# Patient Record
Sex: FEMALE | Race: WHITE | Employment: UNEMPLOYED | URBAN - NONMETROPOLITAN AREA
[De-identification: names, ages, dates, MRNs, and addresses within clinical notes are randomized per-mention and may not be internally consistent; named-entity substitution may affect disease eponyms.]

---

## 2018-08-24 ENCOUNTER — HOSPITAL ENCOUNTER (OUTPATIENT)
Dept: LAB | Age: 58
Setting detail: SPECIMEN
Discharge: HOME | End: 2018-08-24
Payer: MEDICARE

## 2018-08-24 LAB
ALBUMIN SERPL-MCNC: 4.2 G/DL (ref 3.5–5.2)
ALBUMIN/GLOBULIN RATIO: 1.2 (ref 1–2.5)
ALP BLD-CCNC: 122 U/L (ref 35–104)
ALT SERPL-CCNC: 37 U/L (ref 5–33)
ANION GAP SERPL CALCULATED.3IONS-SCNC: 14 MMOL/L (ref 9–17)
AST SERPL-CCNC: 25 U/L
BILIRUB SERPL-MCNC: 0.52 MG/DL (ref 0.3–1.2)
BUN BLDV-MCNC: 7 MG/DL (ref 6–20)
BUN/CREAT BLD: 8 (ref 9–20)
CALCIUM SERPL-MCNC: 9.2 MG/DL (ref 8.6–10.4)
CHLORIDE BLD-SCNC: 99 MMOL/L (ref 98–107)
CO2: 25 MMOL/L (ref 20–31)
CREAT SERPL-MCNC: 0.85 MG/DL (ref 0.5–0.9)
GFR AFRICAN AMERICAN: >60 ML/MIN
GFR NON-AFRICAN AMERICAN: >60 ML/MIN
GFR SERPL CREATININE-BSD FRML MDRD: ABNORMAL ML/MIN/{1.73_M2}
GFR SERPL CREATININE-BSD FRML MDRD: ABNORMAL ML/MIN/{1.73_M2}
GLUCOSE BLD-MCNC: 164 MG/DL (ref 70–99)
POTASSIUM SERPL-SCNC: 3.7 MMOL/L (ref 3.7–5.3)
SODIUM BLD-SCNC: 138 MMOL/L (ref 135–144)
TOTAL PROTEIN: 7.8 G/DL (ref 6.4–8.3)
TSH SERPL DL<=0.05 MIU/L-ACNC: 1.62 MIU/L (ref 0.3–5)

## 2018-08-24 PROCEDURE — 84443 ASSAY THYROID STIM HORMONE: CPT

## 2018-08-24 PROCEDURE — 80053 COMPREHEN METABOLIC PANEL: CPT

## 2018-08-24 PROCEDURE — 36415 COLL VENOUS BLD VENIPUNCTURE: CPT

## 2018-11-04 ENCOUNTER — HOSPITAL ENCOUNTER (EMERGENCY)
Age: 58
Discharge: HOME | End: 2018-11-04
Attending: EMERGENCY MEDICINE
Payer: MEDICARE

## 2018-11-04 VITALS
TEMPERATURE: 98.7 F | SYSTOLIC BLOOD PRESSURE: 148 MMHG | OXYGEN SATURATION: 94 % | HEART RATE: 92 BPM | RESPIRATION RATE: 16 BRPM | DIASTOLIC BLOOD PRESSURE: 70 MMHG

## 2018-11-04 DIAGNOSIS — L29.9 ITCHY SKIN: Primary | ICD-10-CM

## 2018-11-04 PROCEDURE — 99282 EMERGENCY DEPT VISIT SF MDM: CPT

## 2018-11-04 RX ORDER — DIPHENHYDRAMINE HCL 25 MG
50 CAPSULE ORAL EVERY 6 HOURS PRN
Qty: 30 CAPSULE | Refills: 0 | Status: SHIPPED | OUTPATIENT
Start: 2018-11-04

## 2018-11-04 RX ORDER — DIAPER,BRIEF,INFANT-TODD,DISP
EACH MISCELLANEOUS
Qty: 1 TUBE | Refills: 1 | Status: SHIPPED | OUTPATIENT
Start: 2018-11-04 | End: 2018-11-11

## 2018-11-04 NOTE — ED PROVIDER NOTES
[id number] [facility]  eMERGENCY dEPARTMENT eNCOUnter      Pt Name: Myna Jeans  MRN: [id number]  [place] 1960  Date of evaluation: 11/4/2018      CHIEF COMPLAINT       Chief Complaint   Patient presents with    Pruritis     left lower leg         HISTORY OF PRESENT ILLNESS      The patient presents to the emergency department with left leg itching. This is been going on a few days. She is tried some lotion. She has not had leg swelling or calf discomfort. She has not tried Benadryl. This is the only area gets itching. She does a history of diabetes but does not drink alcohol. Nothing makes her symptoms better or worse otherwise. REVIEW OF SYSTEMS       All systems reviewed and negative unless noted in HPI. The patient denies fever or constitutional symptoms. Denies vision change. Denies any sore throat or rhinorrhea. Denies any neck pain or stiffness. Denies chest pain or shortness of breath. No nausea,  vomiting or diarrhea. Denies any dysuria. Denies urinary frequency or hematuria. Denies musculoskeletal injury or pain. Denies any weakness, numbness or focal neurologic deficit. Itching of left leg. No recent psychiatric issues. No easy bruising or bleeding. History of DM. PAST MEDICAL HISTORY    has a past medical history of Diabetes mellitus ([address] and Thyroid disease. SURGICAL HISTORY      has no past surgical history on file. CURRENT MEDICATIONS       Previous Medications    No medications on file       ALLERGIES     is allergic to bicillin [penicillin g benzathine] and geodon [ziprasidone]. FAMILY HISTORY     has no family status information on file. family history is not on file. SOCIAL HISTORY      reports that she has been smoking Cigarettes. She has a 40.00 pack-year smoking history. She has never used smokeless tobacco. She reports that she does not drink alcohol or use drugs.     PHYSICAL EXAM     INITIAL VITALS:  tympanic temperature is 98.7 °F (37.1 °C). Her blood pressure is 148/70 (abnormal) and her pulse is 92. Her respiration is 16 and oxygen saturation is 94%. Patient is alert and oriented, in no apparent distress. HEENT is atraumatic. Pupils are PERRL at 4 mm. Mucous membranes moist.    Neck is supple with no lymphadenopathy. No JVD. No meningismus. Heart sounds regular rate and rhythm with no gallops, murmurs, or rubs. Lungs clear, no wheezes, rales or rhonchi. Abdomen: soft, nontender with no pain to palpation. Musculoskeletal exam shows no evidence of trauma. Skin: 2 areas of redness noted on the patient's left leg, one more proximally and anteriorly. The other is more distally and anteriorly. No significant pharyngeal redness is appreciated. No pain to the calf no. No cords. No edema. Neurological exam reveals cranial nerves 2 through 12 grossly intact. Patient has equal  and normal deep tendon reflexes. Psychiatric: no hallucinations or suicidal ideation. Lymphatics.:  No lymphadenopathy. DIFFERENTIAL DIAGNOSIS/ MDM:     Pruritis, cellulitis, DVT    DIAGNOSTIC RESULTS       EMERGENCY DEPARTMENT COURSE:   Vitals:    Vitals:    11/04/18 1657   BP: (!) 148/70   Pulse: 92   Resp: 16   Temp: 98.7 °F (37.1 °C)   TempSrc: Tympanic   SpO2: 94%     -------------------------  BP: (!) 148/70, Temp: 98.7 °F (37.1 °C), Pulse: 92, Resp: 16      Re-evaluation Notes    I do not think the patient has a DVT. There is no skin breakdown. I will write for steroid cream and she may take Benadryl as needed. The patient is discharged in good condition. FINAL IMPRESSION      1.  Itchy skin          DISPOSITION/PLAN   DISPOSITION Decision To Discharge 11/04/2018 05:04:44 PM      Condition on Disposition    good    PATIENT REFERRED TO:  Quilla Oven, APRN - CNP  [address]  [phone]    In 1 week        DISCHARGE MEDICATIONS:  New Prescriptions

## 2019-01-03 ENCOUNTER — APPOINTMENT (OUTPATIENT)
Dept: GENERAL RADIOLOGY | Age: 59
End: 2019-01-03
Payer: MEDICARE

## 2019-01-03 ENCOUNTER — HOSPITAL ENCOUNTER (EMERGENCY)
Age: 59
Discharge: HOME | End: 2019-01-03
Attending: EMERGENCY MEDICINE
Payer: MEDICARE

## 2019-01-03 ENCOUNTER — APPOINTMENT (OUTPATIENT)
Dept: CT IMAGING | Age: 59
End: 2019-01-03
Payer: MEDICARE

## 2019-01-03 VITALS
DIASTOLIC BLOOD PRESSURE: 77 MMHG | RESPIRATION RATE: 12 BRPM | HEART RATE: 80 BPM | TEMPERATURE: 98.6 F | WEIGHT: 195 LBS | SYSTOLIC BLOOD PRESSURE: 120 MMHG | OXYGEN SATURATION: 98 %

## 2019-01-03 DIAGNOSIS — R41.0 CONFUSION: Primary | ICD-10-CM

## 2019-01-03 LAB
-: ABNORMAL
ABSOLUTE EOS #: 0.2 K/UL (ref 0–0.4)
ABSOLUTE IMMATURE GRANULOCYTE: NORMAL K/UL (ref 0–0.3)
ABSOLUTE LYMPH #: 2.3 K/UL (ref 1–4.8)
ABSOLUTE MONO #: 0.5 K/UL (ref 0.1–1.2)
AMORPHOUS: ABNORMAL
ANION GAP SERPL CALCULATED.3IONS-SCNC: 14 MMOL/L (ref 9–17)
BACTERIA: ABNORMAL
BASOPHILS # BLD: 1 % (ref 0–1)
BASOPHILS ABSOLUTE: 0.1 K/UL (ref 0–0.2)
BILIRUBIN URINE: NEGATIVE
BUN BLDV-MCNC: 12 MG/DL (ref 6–20)
BUN/CREAT BLD: 15 (ref 9–20)
CALCIUM SERPL-MCNC: 9.7 MG/DL (ref 8.6–10.4)
CASTS UA: ABNORMAL /LPF (ref 0–2)
CHLORIDE BLD-SCNC: 102 MMOL/L (ref 98–107)
CO2: 25 MMOL/L (ref 20–31)
COLOR: ABNORMAL
COMMENT UA: ABNORMAL
CREAT SERPL-MCNC: 0.81 MG/DL (ref 0.5–0.9)
CRYSTALS, UA: ABNORMAL /HPF
DIFFERENTIAL TYPE: NORMAL
EOSINOPHILS RELATIVE PERCENT: 2 % (ref 1–7)
EPITHELIAL CELLS UA: ABNORMAL /HPF (ref 0–5)
GFR AFRICAN AMERICAN: >60 ML/MIN
GFR NON-AFRICAN AMERICAN: >60 ML/MIN
GFR SERPL CREATININE-BSD FRML MDRD: ABNORMAL ML/MIN/{1.73_M2}
GFR SERPL CREATININE-BSD FRML MDRD: ABNORMAL ML/MIN/{1.73_M2}
GLUCOSE BLD-MCNC: 202 MG/DL (ref 70–99)
GLUCOSE URINE: ABNORMAL
HCT VFR BLD CALC: 45.3 % (ref 36–46)
HEMOGLOBIN: 15.3 G/DL (ref 12–16)
IMMATURE GRANULOCYTES: NORMAL %
KETONES, URINE: NEGATIVE
LEUKOCYTE ESTERASE, URINE: NEGATIVE
LYMPHOCYTES # BLD: 29 % (ref 16–46)
MCH RBC QN AUTO: 29.6 PG (ref 26–34)
MCHC RBC AUTO-ENTMCNC: 33.9 G/DL (ref 31–37)
MCV RBC AUTO: 87.5 FL (ref 80–100)
MONOCYTES # BLD: 7 % (ref 4–11)
MUCUS: ABNORMAL
NITRITE, URINE: NEGATIVE
NRBC AUTOMATED: NORMAL PER 100 WBC
OTHER OBSERVATIONS UA: ABNORMAL
PDW BLD-RTO: 13.9 % (ref 11–14.5)
PH UA: 6 (ref 5–6)
PLATELET # BLD: 225 K/UL (ref 140–450)
PLATELET ESTIMATE: NORMAL
PMV BLD AUTO: 8.1 FL (ref 6–12)
POTASSIUM SERPL-SCNC: 4.1 MMOL/L (ref 3.7–5.3)
PROTEIN UA: NEGATIVE
RBC # BLD: 5.18 M/UL (ref 4–5.2)
RBC # BLD: NORMAL 10*6/UL
RBC UA: ABNORMAL /HPF (ref 0–4)
RENAL EPITHELIAL, UA: ABNORMAL /HPF
SEG NEUTROPHILS: 61 % (ref 43–77)
SEGMENTED NEUTROPHILS ABSOLUTE COUNT: 5.1 K/UL (ref 1.8–7.7)
SODIUM BLD-SCNC: 141 MMOL/L (ref 135–144)
SPECIFIC GRAVITY UA: 1.02 (ref 1.01–1.02)
TRICHOMONAS: ABNORMAL
TURBIDITY: ABNORMAL
URINE HGB: NEGATIVE
UROBILINOGEN, URINE: NORMAL
WBC # BLD: 8.2 K/UL (ref 3.5–11)
WBC # BLD: NORMAL 10*3/UL
WBC UA: ABNORMAL /HPF (ref 0–4)
YEAST: ABNORMAL

## 2019-01-03 PROCEDURE — 80048 BASIC METABOLIC PNL TOTAL CA: CPT

## 2019-01-03 PROCEDURE — 70450 CT HEAD/BRAIN W/O DYE: CPT

## 2019-01-03 PROCEDURE — 81001 URINALYSIS AUTO W/SCOPE: CPT

## 2019-01-03 PROCEDURE — 85025 COMPLETE CBC W/AUTO DIFF WBC: CPT

## 2019-01-03 PROCEDURE — 99285 EMERGENCY DEPT VISIT HI MDM: CPT

## 2019-01-03 PROCEDURE — 2580000003 HC RX 258: Performed by: EMERGENCY MEDICINE

## 2019-01-03 PROCEDURE — 71045 X-RAY EXAM CHEST 1 VIEW: CPT

## 2019-01-03 RX ORDER — ATORVASTATIN CALCIUM 20 MG/1
20 TABLET, FILM COATED ORAL DAILY
Status: ON HOLD | COMMUNITY
End: 2019-08-29 | Stop reason: HOSPADM

## 2019-01-03 RX ORDER — TRAZODONE HYDROCHLORIDE 100 MG/1
100 TABLET ORAL NIGHTLY
Status: ON HOLD | COMMUNITY
End: 2019-08-29 | Stop reason: HOSPADM

## 2019-01-03 RX ORDER — 0.9 % SODIUM CHLORIDE 0.9 %
1000 INTRAVENOUS SOLUTION INTRAVENOUS ONCE
Status: COMPLETED | OUTPATIENT
Start: 2019-01-03 | End: 2019-01-03

## 2019-01-03 RX ADMIN — SODIUM CHLORIDE 1000 ML: 9 INJECTION, SOLUTION INTRAVENOUS at 18:21

## 2019-08-06 ENCOUNTER — APPOINTMENT (OUTPATIENT)
Dept: GENERAL RADIOLOGY | Age: 59
End: 2019-08-06
Payer: MEDICARE

## 2019-08-06 ENCOUNTER — APPOINTMENT (OUTPATIENT)
Dept: CT IMAGING | Age: 59
End: 2019-08-06
Payer: MEDICARE

## 2019-08-06 ENCOUNTER — HOSPITAL ENCOUNTER (OUTPATIENT)
Age: 59
Setting detail: OBSERVATION
Discharge: ANOTHER HOSPITAL | End: 2019-08-11
Attending: EMERGENCY MEDICINE | Admitting: INTERNAL MEDICINE
Payer: MEDICARE

## 2019-08-06 DIAGNOSIS — R41.82 ALTERED MENTAL STATUS, UNSPECIFIED ALTERED MENTAL STATUS TYPE: Primary | ICD-10-CM

## 2019-08-06 DIAGNOSIS — R50.9 FEBRILE ILLNESS: ICD-10-CM

## 2019-08-06 LAB
-: ABNORMAL
ABSOLUTE EOS #: 0 K/UL (ref 0–0.4)
ABSOLUTE IMMATURE GRANULOCYTE: ABNORMAL K/UL (ref 0–0.3)
ABSOLUTE LYMPH #: 1.3 K/UL (ref 1–4.8)
ABSOLUTE MONO #: 0.9 K/UL (ref 0.1–1.2)
AMORPHOUS: ABNORMAL
ANION GAP SERPL CALCULATED.3IONS-SCNC: 16 MMOL/L (ref 9–17)
BACTERIA: ABNORMAL
BASOPHILS # BLD: 1 % (ref 0–1)
BASOPHILS ABSOLUTE: 0.1 K/UL (ref 0–0.2)
BILIRUBIN URINE: NEGATIVE
BUN BLDV-MCNC: 13 MG/DL (ref 6–20)
BUN/CREAT BLD: 17 (ref 9–20)
CALCIUM SERPL-MCNC: 9.8 MG/DL (ref 8.6–10.4)
CASTS UA: ABNORMAL /LPF (ref 0–2)
CHLORIDE BLD-SCNC: 94 MMOL/L (ref 98–107)
CO2: 25 MMOL/L (ref 20–31)
COLOR: ABNORMAL
COMMENT UA: ABNORMAL
CREAT SERPL-MCNC: 0.75 MG/DL (ref 0.5–0.9)
CRYSTALS, UA: ABNORMAL /HPF
DIFFERENTIAL TYPE: ABNORMAL
EOSINOPHILS RELATIVE PERCENT: 0 % (ref 1–7)
EPITHELIAL CELLS UA: ABNORMAL /HPF (ref 0–5)
GFR AFRICAN AMERICAN: >60 ML/MIN
GFR NON-AFRICAN AMERICAN: >60 ML/MIN
GFR SERPL CREATININE-BSD FRML MDRD: ABNORMAL ML/MIN/{1.73_M2}
GFR SERPL CREATININE-BSD FRML MDRD: ABNORMAL ML/MIN/{1.73_M2}
GLUCOSE BLD-MCNC: 142 MG/DL (ref 65–105)
GLUCOSE BLD-MCNC: 161 MG/DL (ref 65–105)
GLUCOSE BLD-MCNC: 190 MG/DL (ref 70–99)
GLUCOSE URINE: NEGATIVE
HCT VFR BLD CALC: 44 % (ref 36–46)
HEMOGLOBIN: 14.8 G/DL (ref 12–16)
IMMATURE GRANULOCYTES: ABNORMAL %
KETONES, URINE: NEGATIVE
LACTIC ACID: 1.3 MMOL/L (ref 0.5–2.2)
LEUKOCYTE ESTERASE, URINE: NEGATIVE
LYMPHOCYTES # BLD: 9 % (ref 16–46)
MCH RBC QN AUTO: 29.1 PG (ref 26–34)
MCHC RBC AUTO-ENTMCNC: 33.7 G/DL (ref 31–37)
MCV RBC AUTO: 86.5 FL (ref 80–100)
MONOCYTES # BLD: 6 % (ref 4–11)
MUCUS: ABNORMAL
MYOGLOBIN: 132 NG/ML (ref 25–58)
NITRITE, URINE: NEGATIVE
NRBC AUTOMATED: ABNORMAL PER 100 WBC
OTHER OBSERVATIONS UA: ABNORMAL
PDW BLD-RTO: 13.8 % (ref 11–14.5)
PH UA: 6.5 (ref 5–6)
PLATELET # BLD: 316 K/UL (ref 140–450)
PLATELET ESTIMATE: ABNORMAL
PMV BLD AUTO: 6.9 FL (ref 6–12)
POTASSIUM SERPL-SCNC: 3.5 MMOL/L (ref 3.7–5.3)
PROTEIN UA: NEGATIVE
RBC # BLD: 5.09 M/UL (ref 4–5.2)
RBC # BLD: ABNORMAL 10*6/UL
RBC UA: ABNORMAL /HPF (ref 0–4)
RENAL EPITHELIAL, UA: ABNORMAL /HPF
SEG NEUTROPHILS: 84 % (ref 43–77)
SEGMENTED NEUTROPHILS ABSOLUTE COUNT: 11.8 K/UL (ref 1.8–7.7)
SODIUM BLD-SCNC: 135 MMOL/L (ref 135–144)
SPECIFIC GRAVITY UA: 1.01 (ref 1.01–1.02)
THYROXINE, FREE: 2.03 NG/DL (ref 0.93–1.7)
TRICHOMONAS: ABNORMAL
TSH SERPL DL<=0.05 MIU/L-ACNC: 4.08 MIU/L (ref 0.3–5)
TURBIDITY: ABNORMAL
URINE HGB: NEGATIVE
UROBILINOGEN, URINE: NORMAL
WBC # BLD: 14 K/UL (ref 3.5–11)
WBC # BLD: ABNORMAL 10*3/UL
WBC UA: ABNORMAL /HPF (ref 0–4)
YEAST: ABNORMAL

## 2019-08-06 PROCEDURE — 36415 COLL VENOUS BLD VENIPUNCTURE: CPT

## 2019-08-06 PROCEDURE — 82947 ASSAY GLUCOSE BLOOD QUANT: CPT

## 2019-08-06 PROCEDURE — 85025 COMPLETE CBC W/AUTO DIFF WBC: CPT

## 2019-08-06 PROCEDURE — 2500000003 HC RX 250 WO HCPCS: Performed by: INTERNAL MEDICINE

## 2019-08-06 PROCEDURE — 6360000002 HC RX W HCPCS: Performed by: EMERGENCY MEDICINE

## 2019-08-06 PROCEDURE — 84443 ASSAY THYROID STIM HORMONE: CPT

## 2019-08-06 PROCEDURE — 6370000000 HC RX 637 (ALT 250 FOR IP): Performed by: EMERGENCY MEDICINE

## 2019-08-06 PROCEDURE — G0378 HOSPITAL OBSERVATION PER HR: HCPCS

## 2019-08-06 PROCEDURE — 70450 CT HEAD/BRAIN W/O DYE: CPT

## 2019-08-06 PROCEDURE — 83874 ASSAY OF MYOGLOBIN: CPT

## 2019-08-06 PROCEDURE — 99236 HOSP IP/OBS SAME DATE HI 85: CPT | Performed by: INTERNAL MEDICINE

## 2019-08-06 PROCEDURE — 87040 BLOOD CULTURE FOR BACTERIA: CPT

## 2019-08-06 PROCEDURE — 94761 N-INVAS EAR/PLS OXIMETRY MLT: CPT

## 2019-08-06 PROCEDURE — 2500000003 HC RX 250 WO HCPCS

## 2019-08-06 PROCEDURE — 81001 URINALYSIS AUTO W/SCOPE: CPT

## 2019-08-06 PROCEDURE — 6370000000 HC RX 637 (ALT 250 FOR IP): Performed by: INTERNAL MEDICINE

## 2019-08-06 PROCEDURE — 2580000003 HC RX 258: Performed by: INTERNAL MEDICINE

## 2019-08-06 PROCEDURE — 83605 ASSAY OF LACTIC ACID: CPT

## 2019-08-06 PROCEDURE — 2580000003 HC RX 258: Performed by: EMERGENCY MEDICINE

## 2019-08-06 PROCEDURE — 6360000002 HC RX W HCPCS: Performed by: INTERNAL MEDICINE

## 2019-08-06 PROCEDURE — 80048 BASIC METABOLIC PNL TOTAL CA: CPT

## 2019-08-06 PROCEDURE — 96375 TX/PRO/DX INJ NEW DRUG ADDON: CPT

## 2019-08-06 PROCEDURE — 93005 ELECTROCARDIOGRAM TRACING: CPT | Performed by: INTERNAL MEDICINE

## 2019-08-06 PROCEDURE — 84439 ASSAY OF FREE THYROXINE: CPT

## 2019-08-06 PROCEDURE — 84481 FREE ASSAY (FT-3): CPT

## 2019-08-06 PROCEDURE — 71045 X-RAY EXAM CHEST 1 VIEW: CPT

## 2019-08-06 PROCEDURE — 99285 EMERGENCY DEPT VISIT HI MDM: CPT

## 2019-08-06 PROCEDURE — 96372 THER/PROPH/DIAG INJ SC/IM: CPT

## 2019-08-06 PROCEDURE — 96365 THER/PROPH/DIAG IV INF INIT: CPT

## 2019-08-06 RX ORDER — INSULIN GLARGINE 100 [IU]/ML
50 INJECTION, SOLUTION SUBCUTANEOUS NIGHTLY
Status: DISCONTINUED | OUTPATIENT
Start: 2019-08-06 | End: 2019-08-08

## 2019-08-06 RX ORDER — SODIUM CHLORIDE 0.9 % (FLUSH) 0.9 %
10 SYRINGE (ML) INJECTION PRN
Status: DISCONTINUED | OUTPATIENT
Start: 2019-08-06 | End: 2019-08-11 | Stop reason: HOSPADM

## 2019-08-06 RX ORDER — ACETAMINOPHEN 500 MG
1000 TABLET ORAL ONCE
Status: COMPLETED | OUTPATIENT
Start: 2019-08-06 | End: 2019-08-06

## 2019-08-06 RX ORDER — NICOTINE 21 MG/24HR
1 PATCH, TRANSDERMAL 24 HOURS TRANSDERMAL DAILY
Status: DISCONTINUED | OUTPATIENT
Start: 2019-08-06 | End: 2019-08-11 | Stop reason: HOSPADM

## 2019-08-06 RX ORDER — TRAZODONE HYDROCHLORIDE 50 MG/1
100 TABLET ORAL NIGHTLY
Status: DISCONTINUED | OUTPATIENT
Start: 2019-08-06 | End: 2019-08-11 | Stop reason: HOSPADM

## 2019-08-06 RX ORDER — ACETAMINOPHEN 325 MG/1
650 TABLET ORAL EVERY 4 HOURS PRN
Status: DISCONTINUED | OUTPATIENT
Start: 2019-08-06 | End: 2019-08-11 | Stop reason: HOSPADM

## 2019-08-06 RX ORDER — DEXTROSE MONOHYDRATE 50 MG/ML
100 INJECTION, SOLUTION INTRAVENOUS PRN
Status: DISCONTINUED | OUTPATIENT
Start: 2019-08-06 | End: 2019-08-11 | Stop reason: HOSPADM

## 2019-08-06 RX ORDER — POTASSIUM CHLORIDE 20 MEQ/1
40 TABLET, EXTENDED RELEASE ORAL ONCE
Status: COMPLETED | OUTPATIENT
Start: 2019-08-06 | End: 2019-08-06

## 2019-08-06 RX ORDER — DEXTROSE MONOHYDRATE 25 G/50ML
12.5 INJECTION, SOLUTION INTRAVENOUS PRN
Status: DISCONTINUED | OUTPATIENT
Start: 2019-08-06 | End: 2019-08-11 | Stop reason: HOSPADM

## 2019-08-06 RX ORDER — ATORVASTATIN CALCIUM 10 MG/1
20 TABLET, FILM COATED ORAL DAILY
Status: DISCONTINUED | OUTPATIENT
Start: 2019-08-06 | End: 2019-08-11 | Stop reason: HOSPADM

## 2019-08-06 RX ORDER — NICOTINE POLACRILEX 4 MG
15 LOZENGE BUCCAL PRN
Status: DISCONTINUED | OUTPATIENT
Start: 2019-08-06 | End: 2019-08-11 | Stop reason: HOSPADM

## 2019-08-06 RX ORDER — SODIUM CHLORIDE 0.9 % (FLUSH) 0.9 %
10 SYRINGE (ML) INJECTION EVERY 12 HOURS SCHEDULED
Status: DISCONTINUED | OUTPATIENT
Start: 2019-08-06 | End: 2019-08-11 | Stop reason: HOSPADM

## 2019-08-06 RX ORDER — 0.9 % SODIUM CHLORIDE 0.9 %
1000 INTRAVENOUS SOLUTION INTRAVENOUS ONCE
Status: COMPLETED | OUTPATIENT
Start: 2019-08-06 | End: 2019-08-06

## 2019-08-06 RX ADMIN — SODIUM BICARBONATE 50 MEQ: 84 INJECTION, SOLUTION INTRAVENOUS at 18:43

## 2019-08-06 RX ADMIN — POTASSIUM CHLORIDE 40 MEQ: 20 TABLET, EXTENDED RELEASE ORAL at 17:30

## 2019-08-06 RX ADMIN — ACETAMINOPHEN 1000 MG: 500 TABLET, FILM COATED ORAL at 14:58

## 2019-08-06 RX ADMIN — INSULIN GLARGINE 50 UNITS: 100 INJECTION, SOLUTION SUBCUTANEOUS at 21:50

## 2019-08-06 RX ADMIN — CEFEPIME HYDROCHLORIDE 2 G: 2 INJECTION, POWDER, FOR SOLUTION INTRAVENOUS at 15:33

## 2019-08-06 RX ADMIN — INSULIN LISPRO 2 UNITS: 100 INJECTION, SOLUTION INTRAVENOUS; SUBCUTANEOUS at 17:30

## 2019-08-06 RX ADMIN — INSULIN LISPRO 1 UNITS: 100 INJECTION, SOLUTION INTRAVENOUS; SUBCUTANEOUS at 21:49

## 2019-08-06 RX ADMIN — SODIUM CHLORIDE 1000 ML: 9 INJECTION, SOLUTION INTRAVENOUS at 11:20

## 2019-08-06 RX ADMIN — ENOXAPARIN SODIUM 40 MG: 40 INJECTION SUBCUTANEOUS at 17:30

## 2019-08-06 RX ADMIN — SODIUM BICARBONATE: 84 INJECTION, SOLUTION INTRAVENOUS at 18:47

## 2019-08-07 ENCOUNTER — APPOINTMENT (OUTPATIENT)
Dept: GENERAL RADIOLOGY | Age: 59
End: 2019-08-07
Payer: MEDICARE

## 2019-08-07 LAB
-: NORMAL
ANION GAP SERPL CALCULATED.3IONS-SCNC: 15 MMOL/L (ref 9–17)
BUN BLDV-MCNC: 10 MG/DL (ref 6–20)
BUN/CREAT BLD: 16 (ref 9–20)
CALCIUM SERPL-MCNC: 9.3 MG/DL (ref 8.6–10.4)
CHLORIDE BLD-SCNC: 100 MMOL/L (ref 98–107)
CO2: 23 MMOL/L (ref 20–31)
CREAT SERPL-MCNC: 0.63 MG/DL (ref 0.5–0.9)
EKG ATRIAL RATE: 90 BPM
EKG ATRIAL RATE: 91 BPM
EKG P AXIS: 51 DEGREES
EKG P AXIS: 60 DEGREES
EKG P-R INTERVAL: 168 MS
EKG P-R INTERVAL: 174 MS
EKG Q-T INTERVAL: 368 MS
EKG Q-T INTERVAL: 380 MS
EKG QRS DURATION: 70 MS
EKG QRS DURATION: 74 MS
EKG QTC CALCULATION (BAZETT): 452 MS
EKG QTC CALCULATION (BAZETT): 464 MS
EKG R AXIS: 18 DEGREES
EKG R AXIS: 8 DEGREES
EKG T AXIS: 26 DEGREES
EKG T AXIS: 27 DEGREES
EKG VENTRICULAR RATE: 90 BPM
EKG VENTRICULAR RATE: 91 BPM
GFR AFRICAN AMERICAN: >60 ML/MIN
GFR NON-AFRICAN AMERICAN: >60 ML/MIN
GFR SERPL CREATININE-BSD FRML MDRD: ABNORMAL ML/MIN/{1.73_M2}
GFR SERPL CREATININE-BSD FRML MDRD: ABNORMAL ML/MIN/{1.73_M2}
GLUCOSE BLD-MCNC: 130 MG/DL (ref 65–105)
GLUCOSE BLD-MCNC: 134 MG/DL (ref 65–105)
GLUCOSE BLD-MCNC: 136 MG/DL (ref 65–105)
GLUCOSE BLD-MCNC: 145 MG/DL (ref 70–99)
GLUCOSE BLD-MCNC: 156 MG/DL (ref 65–105)
HCT VFR BLD CALC: 43.6 % (ref 36–46)
HEMOGLOBIN: 14.6 G/DL (ref 12–16)
MCH RBC QN AUTO: 29.5 PG (ref 26–34)
MCHC RBC AUTO-ENTMCNC: 33.5 G/DL (ref 31–37)
MCV RBC AUTO: 88 FL (ref 80–100)
NRBC AUTOMATED: ABNORMAL PER 100 WBC
PDW BLD-RTO: 13.8 % (ref 11–14.5)
PLATELET # BLD: 326 K/UL (ref 140–450)
PMV BLD AUTO: 7.4 FL (ref 6–12)
POTASSIUM SERPL-SCNC: 4.1 MMOL/L (ref 3.7–5.3)
RBC # BLD: 4.95 M/UL (ref 4–5.2)
REASON FOR REJECTION: NORMAL
SODIUM BLD-SCNC: 138 MMOL/L (ref 135–144)
T3 FREE: 3 PG/ML (ref 2.02–4.43)
WBC # BLD: 12.6 K/UL (ref 3.5–11)
ZZ NTE CLEAN UP: ORDERED TEST: NORMAL
ZZ NTE WITH NAME CLEAN UP: SPECIMEN SOURCE: NORMAL

## 2019-08-07 PROCEDURE — 96376 TX/PRO/DX INJ SAME DRUG ADON: CPT

## 2019-08-07 PROCEDURE — 2580000003 HC RX 258: Performed by: EMERGENCY MEDICINE

## 2019-08-07 PROCEDURE — G0378 HOSPITAL OBSERVATION PER HR: HCPCS

## 2019-08-07 PROCEDURE — 96375 TX/PRO/DX INJ NEW DRUG ADDON: CPT

## 2019-08-07 PROCEDURE — 6360000002 HC RX W HCPCS: Performed by: INTERNAL MEDICINE

## 2019-08-07 PROCEDURE — 51702 INSERT TEMP BLADDER CATH: CPT

## 2019-08-07 PROCEDURE — 36415 COLL VENOUS BLD VENIPUNCTURE: CPT

## 2019-08-07 PROCEDURE — 93005 ELECTROCARDIOGRAM TRACING: CPT | Performed by: INTERNAL MEDICINE

## 2019-08-07 PROCEDURE — 6370000000 HC RX 637 (ALT 250 FOR IP): Performed by: INTERNAL MEDICINE

## 2019-08-07 PROCEDURE — 71045 X-RAY EXAM CHEST 1 VIEW: CPT

## 2019-08-07 PROCEDURE — 2500000003 HC RX 250 WO HCPCS: Performed by: INTERNAL MEDICINE

## 2019-08-07 PROCEDURE — 6360000002 HC RX W HCPCS: Performed by: NURSE PRACTITIONER

## 2019-08-07 PROCEDURE — 6360000002 HC RX W HCPCS: Performed by: EMERGENCY MEDICINE

## 2019-08-07 PROCEDURE — 80048 BASIC METABOLIC PNL TOTAL CA: CPT

## 2019-08-07 PROCEDURE — 85027 COMPLETE CBC AUTOMATED: CPT

## 2019-08-07 PROCEDURE — 96366 THER/PROPH/DIAG IV INF ADDON: CPT

## 2019-08-07 PROCEDURE — 2500000003 HC RX 250 WO HCPCS: Performed by: NURSE PRACTITIONER

## 2019-08-07 PROCEDURE — 82947 ASSAY GLUCOSE BLOOD QUANT: CPT

## 2019-08-07 PROCEDURE — 2580000003 HC RX 258: Performed by: INTERNAL MEDICINE

## 2019-08-07 PROCEDURE — 2500000003 HC RX 250 WO HCPCS

## 2019-08-07 PROCEDURE — 94760 N-INVAS EAR/PLS OXIMETRY 1: CPT

## 2019-08-07 PROCEDURE — 51798 US URINE CAPACITY MEASURE: CPT

## 2019-08-07 PROCEDURE — 99225 PR SBSQ OBSERVATION CARE/DAY 25 MINUTES: CPT | Performed by: INTERNAL MEDICINE

## 2019-08-07 PROCEDURE — 96372 THER/PROPH/DIAG INJ SC/IM: CPT

## 2019-08-07 RX ORDER — BETHANECHOL CHLORIDE 25 MG/1
25 TABLET ORAL 4 TIMES DAILY
Status: DISCONTINUED | OUTPATIENT
Start: 2019-08-07 | End: 2019-08-11 | Stop reason: HOSPADM

## 2019-08-07 RX ORDER — SODIUM CHLORIDE 9 MG/ML
INJECTION, SOLUTION INTRAVENOUS CONTINUOUS
Status: DISCONTINUED | OUTPATIENT
Start: 2019-08-07 | End: 2019-08-08

## 2019-08-07 RX ORDER — METOPROLOL TARTRATE 5 MG/5ML
5 INJECTION INTRAVENOUS EVERY 4 HOURS PRN
Status: DISCONTINUED | OUTPATIENT
Start: 2019-08-07 | End: 2019-08-11 | Stop reason: HOSPADM

## 2019-08-07 RX ORDER — HYDRALAZINE HYDROCHLORIDE 20 MG/ML
10 INJECTION INTRAMUSCULAR; INTRAVENOUS EVERY 6 HOURS PRN
Status: DISCONTINUED | OUTPATIENT
Start: 2019-08-07 | End: 2019-08-11 | Stop reason: HOSPADM

## 2019-08-07 RX ORDER — METOPROLOL TARTRATE 5 MG/5ML
5 INJECTION INTRAVENOUS ONCE
Status: COMPLETED | OUTPATIENT
Start: 2019-08-07 | End: 2019-08-07

## 2019-08-07 RX ORDER — HYDRALAZINE HYDROCHLORIDE 20 MG/ML
20 INJECTION INTRAMUSCULAR; INTRAVENOUS EVERY 6 HOURS PRN
Status: DISCONTINUED | OUTPATIENT
Start: 2019-08-07 | End: 2019-08-11 | Stop reason: HOSPADM

## 2019-08-07 RX ADMIN — METOPROLOL TARTRATE 5 MG: 5 INJECTION INTRAVENOUS at 04:39

## 2019-08-07 RX ADMIN — METOPROLOL TARTRATE 5 MG: 5 INJECTION INTRAVENOUS at 14:46

## 2019-08-07 RX ADMIN — SODIUM BICARBONATE 50 MEQ: 84 INJECTION, SOLUTION INTRAVENOUS at 02:58

## 2019-08-07 RX ADMIN — SODIUM CHLORIDE: 9 INJECTION, SOLUTION INTRAVENOUS at 23:20

## 2019-08-07 RX ADMIN — METOPROLOL TARTRATE 5 MG: 5 INJECTION INTRAVENOUS at 20:14

## 2019-08-07 RX ADMIN — INSULIN LISPRO 2 UNITS: 100 INJECTION, SOLUTION INTRAVENOUS; SUBCUTANEOUS at 08:20

## 2019-08-07 RX ADMIN — METOPROLOL TARTRATE 5 MG: 5 INJECTION INTRAVENOUS at 12:29

## 2019-08-07 RX ADMIN — SODIUM CHLORIDE: 9 INJECTION, SOLUTION INTRAVENOUS at 14:45

## 2019-08-07 RX ADMIN — CEFEPIME HYDROCHLORIDE 2 G: 2 INJECTION, POWDER, FOR SOLUTION INTRAVENOUS at 15:19

## 2019-08-07 RX ADMIN — INSULIN GLARGINE 50 UNITS: 100 INJECTION, SOLUTION SUBCUTANEOUS at 20:27

## 2019-08-07 RX ADMIN — SODIUM BICARBONATE: 84 INJECTION, SOLUTION INTRAVENOUS at 13:24

## 2019-08-07 RX ADMIN — ENOXAPARIN SODIUM 40 MG: 40 INJECTION SUBCUTANEOUS at 08:20

## 2019-08-07 RX ADMIN — CEFEPIME HYDROCHLORIDE 2 G: 2 INJECTION, POWDER, FOR SOLUTION INTRAVENOUS at 02:58

## 2019-08-07 RX ADMIN — SODIUM BICARBONATE: 84 INJECTION, SOLUTION INTRAVENOUS at 02:59

## 2019-08-07 RX ADMIN — HYDRALAZINE HYDROCHLORIDE 20 MG: 20 INJECTION INTRAMUSCULAR; INTRAVENOUS at 21:00

## 2019-08-07 ASSESSMENT — PAIN SCALES - GENERAL: PAINLEVEL_OUTOF10: 0

## 2019-08-07 NOTE — H&P
capsules by mouth every 6 hours as needed for Itching    Allergies:    Bicillin [penicillin g benzathine] and Geodon [ziprasidone]    Social History:    reports that she quit smoking about 5 months ago. Her smoking use included cigarettes. She has a 40.00 pack-year smoking history. She has never used smokeless tobacco. She reports that she does not drink alcohol or use drugs. Family History:   family history includes Diabetes in her mother; Heart Disease in her mother; Stroke in her brother. REVIEW OF SYSTEMS:  See HPI and problem list; otherwise no other new complaints with respect to HEENT, neck, pulmonary, coronary, GI, , endocrine, musculoskeletal, immune system/connective tissue disease, hematologic, neuropsych, skin, lymphatics, or malignancies. PHYSICAL EXAM:  Vitals:  BP (!) 153/88   Pulse 91   Temp 97 °F (36.1 °C) (Tympanic)   Resp 18   Ht 5' 5\" (1.651 m)   Wt 189 lb 1.6 oz (85.8 kg)   SpO2 96%   BMI 31.47 kg/m²     HEENT: Normocephalic and Atraumatic  Neck: Supple, No Masses, Tenderness, Nodularity and No Lymphadenopathy  Chest/Lungs: Clear to Auscultation without Rales, Rhonchi, or Wheezes  Cardiac: Regular Rate and Rhythm  GI/Abdomen:  Bowel Sounds Present and Soft, Non-tender, without Guarding or Rebound Tenderness  : Not examined  EXT/Skin: No Edema, No Cyanosis and No Clubbing  Neuro: alert--tends to keep eyes closed--laconic to no answers to question--episodically apppears as if to be tearful ----very flat affect and No Localizing Signs/Symptoms        LABS:    CBC with Differential:    Lab Results   Component Value Date    WBC 14.0 08/06/2019    RBC 5.09 08/06/2019    HGB 14.8 08/06/2019    HCT 44.0 08/06/2019     08/06/2019    MCV 86.5 08/06/2019    MCH 29.1 08/06/2019    MCHC 33.7 08/06/2019    RDW 13.8 08/06/2019    LYMPHOPCT 9 08/06/2019    MONOPCT 6 08/06/2019    BASOPCT 1 08/06/2019    MONOSABS 0.90 08/06/2019    LYMPHSABS 1.30 08/06/2019    EOSABS 0.00 08/06/2019

## 2019-08-08 ENCOUNTER — APPOINTMENT (OUTPATIENT)
Dept: GENERAL RADIOLOGY | Age: 59
End: 2019-08-08
Payer: MEDICARE

## 2019-08-08 LAB
ABSOLUTE EOS #: 0 K/UL (ref 0–0.4)
ABSOLUTE IMMATURE GRANULOCYTE: ABNORMAL K/UL (ref 0–0.3)
ABSOLUTE LYMPH #: 1.8 K/UL (ref 1–4.8)
ABSOLUTE MONO #: 1 K/UL (ref 0.1–1.2)
ANION GAP SERPL CALCULATED.3IONS-SCNC: 16 MMOL/L (ref 9–17)
BASOPHILS # BLD: 0 % (ref 0–1)
BASOPHILS ABSOLUTE: 0.1 K/UL (ref 0–0.2)
BUN BLDV-MCNC: 10 MG/DL (ref 6–20)
BUN/CREAT BLD: 15 (ref 9–20)
CALCIUM SERPL-MCNC: 9.4 MG/DL (ref 8.6–10.4)
CHLORIDE BLD-SCNC: 101 MMOL/L (ref 98–107)
CO2: 21 MMOL/L (ref 20–31)
CREAT SERPL-MCNC: 0.66 MG/DL (ref 0.5–0.9)
DIFFERENTIAL TYPE: ABNORMAL
EOSINOPHILS RELATIVE PERCENT: 0 % (ref 1–7)
GFR AFRICAN AMERICAN: >60 ML/MIN
GFR NON-AFRICAN AMERICAN: >60 ML/MIN
GFR SERPL CREATININE-BSD FRML MDRD: ABNORMAL ML/MIN/{1.73_M2}
GFR SERPL CREATININE-BSD FRML MDRD: ABNORMAL ML/MIN/{1.73_M2}
GLUCOSE BLD-MCNC: 132 MG/DL (ref 65–105)
GLUCOSE BLD-MCNC: 140 MG/DL (ref 65–105)
GLUCOSE BLD-MCNC: 148 MG/DL (ref 70–99)
HCT VFR BLD CALC: 46.4 % (ref 36–46)
HEMOGLOBIN: 15.6 G/DL (ref 12–16)
IMMATURE GRANULOCYTES: ABNORMAL %
LYMPHOCYTES # BLD: 10 % (ref 16–46)
MCH RBC QN AUTO: 29.3 PG (ref 26–34)
MCHC RBC AUTO-ENTMCNC: 33.7 G/DL (ref 31–37)
MCV RBC AUTO: 86.8 FL (ref 80–100)
MONOCYTES # BLD: 6 % (ref 4–11)
NRBC AUTOMATED: ABNORMAL PER 100 WBC
PDW BLD-RTO: 14.1 % (ref 11–14.5)
PLATELET # BLD: 362 K/UL (ref 140–450)
PLATELET ESTIMATE: ABNORMAL
PMV BLD AUTO: 7.5 FL (ref 6–12)
POTASSIUM SERPL-SCNC: 3.3 MMOL/L (ref 3.7–5.3)
RBC # BLD: 5.34 M/UL (ref 4–5.2)
RBC # BLD: ABNORMAL 10*6/UL
SEG NEUTROPHILS: 84 % (ref 43–77)
SEGMENTED NEUTROPHILS ABSOLUTE COUNT: 15.1 K/UL (ref 1.8–7.7)
SODIUM BLD-SCNC: 138 MMOL/L (ref 135–144)
WBC # BLD: 18.1 K/UL (ref 3.5–11)
WBC # BLD: ABNORMAL 10*3/UL

## 2019-08-08 PROCEDURE — 82947 ASSAY GLUCOSE BLOOD QUANT: CPT

## 2019-08-08 PROCEDURE — 6360000002 HC RX W HCPCS: Performed by: NURSE PRACTITIONER

## 2019-08-08 PROCEDURE — 6370000000 HC RX 637 (ALT 250 FOR IP): Performed by: NURSE PRACTITIONER

## 2019-08-08 PROCEDURE — 96375 TX/PRO/DX INJ NEW DRUG ADDON: CPT

## 2019-08-08 PROCEDURE — 2580000003 HC RX 258: Performed by: INTERNAL MEDICINE

## 2019-08-08 PROCEDURE — 6360000002 HC RX W HCPCS: Performed by: EMERGENCY MEDICINE

## 2019-08-08 PROCEDURE — 96372 THER/PROPH/DIAG INJ SC/IM: CPT

## 2019-08-08 PROCEDURE — 85025 COMPLETE CBC W/AUTO DIFF WBC: CPT

## 2019-08-08 PROCEDURE — 94760 N-INVAS EAR/PLS OXIMETRY 1: CPT

## 2019-08-08 PROCEDURE — G0378 HOSPITAL OBSERVATION PER HR: HCPCS

## 2019-08-08 PROCEDURE — 6360000002 HC RX W HCPCS: Performed by: INTERNAL MEDICINE

## 2019-08-08 PROCEDURE — 6370000000 HC RX 637 (ALT 250 FOR IP): Performed by: INTERNAL MEDICINE

## 2019-08-08 PROCEDURE — 36415 COLL VENOUS BLD VENIPUNCTURE: CPT

## 2019-08-08 PROCEDURE — 96366 THER/PROPH/DIAG IV INF ADDON: CPT

## 2019-08-08 PROCEDURE — 80048 BASIC METABOLIC PNL TOTAL CA: CPT

## 2019-08-08 PROCEDURE — 99232 SBSQ HOSP IP/OBS MODERATE 35: CPT | Performed by: INTERNAL MEDICINE

## 2019-08-08 PROCEDURE — 2500000003 HC RX 250 WO HCPCS: Performed by: NURSE PRACTITIONER

## 2019-08-08 PROCEDURE — 71045 X-RAY EXAM CHEST 1 VIEW: CPT

## 2019-08-08 PROCEDURE — 96376 TX/PRO/DX INJ SAME DRUG ADON: CPT

## 2019-08-08 PROCEDURE — 2580000003 HC RX 258: Performed by: EMERGENCY MEDICINE

## 2019-08-08 RX ORDER — METOPROLOL TARTRATE 5 MG/5ML
5 INJECTION INTRAVENOUS ONCE
Status: COMPLETED | OUTPATIENT
Start: 2019-08-08 | End: 2019-08-08

## 2019-08-08 RX ORDER — INSULIN GLARGINE 100 [IU]/ML
55 INJECTION, SOLUTION SUBCUTANEOUS NIGHTLY
Status: DISCONTINUED | OUTPATIENT
Start: 2019-08-08 | End: 2019-08-11 | Stop reason: HOSPADM

## 2019-08-08 RX ORDER — ACETAMINOPHEN 650 MG/1
650 SUPPOSITORY RECTAL EVERY 4 HOURS PRN
Status: DISCONTINUED | OUTPATIENT
Start: 2019-08-08 | End: 2019-08-11 | Stop reason: HOSPADM

## 2019-08-08 RX ORDER — BENZTROPINE MESYLATE 1 MG/1
0.5 TABLET ORAL DAILY
Status: DISCONTINUED | OUTPATIENT
Start: 2019-08-08 | End: 2019-08-11 | Stop reason: HOSPADM

## 2019-08-08 RX ORDER — SODIUM CHLORIDE AND POTASSIUM CHLORIDE .9; .15 G/100ML; G/100ML
SOLUTION INTRAVENOUS CONTINUOUS
Status: DISCONTINUED | OUTPATIENT
Start: 2019-08-08 | End: 2019-08-10

## 2019-08-08 RX ORDER — FUROSEMIDE 10 MG/ML
20 INJECTION INTRAMUSCULAR; INTRAVENOUS ONCE
Status: COMPLETED | OUTPATIENT
Start: 2019-08-08 | End: 2019-08-08

## 2019-08-08 RX ADMIN — ENOXAPARIN SODIUM 40 MG: 40 INJECTION SUBCUTANEOUS at 09:09

## 2019-08-08 RX ADMIN — INSULIN LISPRO 7 UNITS: 100 INJECTION, SOLUTION INTRAVENOUS; SUBCUTANEOUS at 12:10

## 2019-08-08 RX ADMIN — POTASSIUM CHLORIDE AND SODIUM CHLORIDE: 900; 150 INJECTION, SOLUTION INTRAVENOUS at 08:40

## 2019-08-08 RX ADMIN — POTASSIUM CHLORIDE AND SODIUM CHLORIDE: 900; 150 INJECTION, SOLUTION INTRAVENOUS at 17:44

## 2019-08-08 RX ADMIN — BETHANECHOL CHLORIDE 25 MG: 25 TABLET ORAL at 09:09

## 2019-08-08 RX ADMIN — HYDRALAZINE HYDROCHLORIDE 20 MG: 20 INJECTION INTRAMUSCULAR; INTRAVENOUS at 04:50

## 2019-08-08 RX ADMIN — INSULIN LISPRO 2 UNITS: 100 INJECTION, SOLUTION INTRAVENOUS; SUBCUTANEOUS at 12:10

## 2019-08-08 RX ADMIN — METOPROLOL TARTRATE 5 MG: 5 INJECTION INTRAVENOUS at 05:46

## 2019-08-08 RX ADMIN — INSULIN GLARGINE 55 UNITS: 100 INJECTION, SOLUTION SUBCUTANEOUS at 20:15

## 2019-08-08 RX ADMIN — ACETAMINOPHEN 650 MG: 650 SUPPOSITORY RECTAL at 05:46

## 2019-08-08 RX ADMIN — METOPROLOL TARTRATE 5 MG: 5 INJECTION INTRAVENOUS at 19:55

## 2019-08-08 RX ADMIN — INSULIN LISPRO 5 UNITS: 100 INJECTION, SOLUTION INTRAVENOUS; SUBCUTANEOUS at 09:10

## 2019-08-08 RX ADMIN — HYDRALAZINE HYDROCHLORIDE 10 MG: 20 INJECTION INTRAMUSCULAR; INTRAVENOUS at 09:20

## 2019-08-08 RX ADMIN — SODIUM CHLORIDE: 9 INJECTION, SOLUTION INTRAVENOUS at 08:03

## 2019-08-08 RX ADMIN — INSULIN LISPRO 7 UNITS: 100 INJECTION, SOLUTION INTRAVENOUS; SUBCUTANEOUS at 18:03

## 2019-08-08 RX ADMIN — BENZTROPINE MESYLATE 0.5 MG: 1 TABLET ORAL at 15:43

## 2019-08-08 RX ADMIN — HYDRALAZINE HYDROCHLORIDE 20 MG: 20 INJECTION INTRAMUSCULAR; INTRAVENOUS at 15:05

## 2019-08-08 RX ADMIN — BETHANECHOL CHLORIDE 25 MG: 25 TABLET ORAL at 12:10

## 2019-08-08 RX ADMIN — HYDRALAZINE HYDROCHLORIDE 20 MG: 20 INJECTION INTRAMUSCULAR; INTRAVENOUS at 23:52

## 2019-08-08 RX ADMIN — FUROSEMIDE 20 MG: 10 INJECTION, SOLUTION INTRAMUSCULAR; INTRAVENOUS at 10:08

## 2019-08-08 RX ADMIN — METOPROLOL TARTRATE 5 MG: 5 INJECTION INTRAVENOUS at 15:42

## 2019-08-08 RX ADMIN — CEFEPIME HYDROCHLORIDE 2 G: 2 INJECTION, POWDER, FOR SOLUTION INTRAVENOUS at 02:41

## 2019-08-08 RX ADMIN — TRAZODONE HYDROCHLORIDE 100 MG: 50 TABLET ORAL at 20:18

## 2019-08-08 RX ADMIN — CEFEPIME HYDROCHLORIDE 2 G: 2 INJECTION, POWDER, FOR SOLUTION INTRAVENOUS at 15:05

## 2019-08-08 RX ADMIN — INSULIN LISPRO 2 UNITS: 100 INJECTION, SOLUTION INTRAVENOUS; SUBCUTANEOUS at 09:09

## 2019-08-08 RX ADMIN — ATORVASTATIN CALCIUM 20 MG: 10 TABLET, FILM COATED ORAL at 20:18

## 2019-08-08 RX ADMIN — BETHANECHOL CHLORIDE 25 MG: 25 TABLET ORAL at 20:18

## 2019-08-08 ASSESSMENT — PAIN SCALES - GENERAL
PAINLEVEL_OUTOF10: 0

## 2019-08-08 NOTE — PROGRESS NOTES
Hospitalist Progress Note    Patient:  Martha Benitez     YOB: 1960    MRN: [id number]   Admit date: 8/6/2019     Acct: [de-identified]     PCP: DESIREE Palafox CNP    CC--Interval History: Pneumonia---on Cefepime---med nebs--O2    DM2--above goal    Altered mental state---still very withdrawn----suspect underlying psychiatric issues are a significant contributing factor to current level of interaction    Schizophrenia--depression--no change    K = 3.3  Potassium replacement    See orders    All other ROS negative except noted in HPI    Diet:  DIET CARB CONTROL; Carb Control: 4 carb choices (60 gms)/meal    Medications:  Scheduled Meds:   insulin glargine  55 Units Subcutaneous Nightly    insulin lispro  7 Units Subcutaneous TID WC    bethanechol  25 mg Oral 4x Daily    cefepime  2 g Intravenous Once    cefepime  2 g Intravenous Q12H    atorvastatin  20 mg Oral Daily    traZODone  100 mg Oral Nightly    sodium chloride flush  10 mL Intravenous 2 times per day    enoxaparin  40 mg Subcutaneous Daily    nicotine  1 patch Transdermal Daily    insulin lispro  0-12 Units Subcutaneous TID WC    insulin lispro  0-6 Units Subcutaneous Nightly     Continuous Infusions:   0.9% NaCl with KCl 20 mEq 125 mL/hr at 08/08/19 0840    dextrose       PRN Meds:acetaminophen, metoprolol, hydrALAZINE, hydrALAZINE, sodium chloride flush, acetaminophen, glucose, dextrose, glucagon (rDNA), dextrose    Objective:  Labs:  CBC with Differential:    Lab Results   Component Value Date    WBC 18.1 08/08/2019    RBC 5.34 08/08/2019    HGB 15.6 08/08/2019    HCT 46.4 08/08/2019     08/08/2019    MCV 86.8 08/08/2019    MCH 29.3 08/08/2019    MCHC 33.7 08/08/2019    RDW 14.1 08/08/2019    LYMPHOPCT 10 08/08/2019    MONOPCT 6 08/08/2019    BASOPCT 0 08/08/2019    MONOSABS 1.00 08/08/2019    LYMPHSABS 1.80 08/08/2019    EOSABS 0.00 08/08/2019    BASOSABS 0.10 08/08/2019    DIFFTYPE NOT REPORTED 08/08/2019 BMP:    Lab Results   Component Value Date     08/08/2019    K 3.3 08/08/2019     08/08/2019    CO2 21 08/08/2019    BUN 10 08/08/2019    LABALBU 4.2 08/24/2018    CREATININE 0.66 08/08/2019    CALCIUM 9.4 08/08/2019    GFRAA >60 08/08/2019    LABGLOM >60 08/08/2019    GLUCOSE 148 08/08/2019           Physical Exam:  Vitals: BP (!) 172/95   Pulse 113   Temp 98.3 °F (36.8 °C) (Tympanic)   Resp 18   Ht 5' 5\" (1.651 m)   Wt 177 lb 14.4 oz (80.7 kg)   SpO2 96%   BMI 29.60 kg/m²   24 hour intake/output:    Intake/Output Summary (Last 24 hours) at 8/8/2019 1248  Last data filed at 8/8/2019 1221  Gross per 24 hour   Intake 3240.68 ml   Output 2650 ml   Net 590.68 ml     Last 3 weights: Wt Readings from Last 3 Encounters:   08/08/19 177 lb 14.4 oz (80.7 kg)   01/03/19 195 lb (88.5 kg)     HEENT: Normocephalic and Atraumatic  Neck: Supple, No Masses, Tenderness, Nodularity and No Lymphadenopathy  Chest/Lungs: Distant Breath Sounds  Cardiac: Regular Rate and Rhythm  GI/Abdomen: Bowel Sounds Present and Soft, Non-tender, without Guarding or Rebound Tenderness  : irizarry cahteter [urinary retention]  EXT/Skin: No Edema, No Cyanosis and No Clubbing  Neuro: alert---minimally communicative---- and No Localizing Signs/Symptoms      Assessment:    Active Problems:    Altered mental status  Resolved Problems:    * No resolved hospital problems. *    [facility]  58  WF  [Tegan Foster NP--[facility]]  FULL CODE     LOVENOX     IRIZARRY    Anti-infectives:  Cefepime IV                                  [no steroids]    Pneumonia---8. 6.2019---left-sided  Altered mental status--8. 6.2019--increasing confusion at home         CXR----8.8.2019--mild central congestion---LLL opacity          EKG--8.7.2019---NSR---90---sinus arrhythmia--NACs         CXR---8.7.2019--low lung volumes--LLL-lingula atelectasis--vs--evolving pneumonia         CXR---8.6.2019---[-]         CT

## 2019-08-09 ENCOUNTER — APPOINTMENT (OUTPATIENT)
Dept: GENERAL RADIOLOGY | Age: 59
End: 2019-08-09
Payer: MEDICARE

## 2019-08-09 LAB
ABSOLUTE EOS #: 0.1 K/UL (ref 0–0.4)
ABSOLUTE IMMATURE GRANULOCYTE: ABNORMAL K/UL (ref 0–0.3)
ABSOLUTE LYMPH #: 2 K/UL (ref 1–4.8)
ABSOLUTE MONO #: 0.9 K/UL (ref 0.1–1.2)
ANION GAP SERPL CALCULATED.3IONS-SCNC: 14 MMOL/L (ref 9–17)
BASOPHILS # BLD: 1 % (ref 0–1)
BASOPHILS ABSOLUTE: 0.1 K/UL (ref 0–0.2)
BUN BLDV-MCNC: 13 MG/DL (ref 6–20)
BUN/CREAT BLD: 18 (ref 9–20)
CALCIUM SERPL-MCNC: 8.8 MG/DL (ref 8.6–10.4)
CHLORIDE BLD-SCNC: 107 MMOL/L (ref 98–107)
CO2: 19 MMOL/L (ref 20–31)
CREAT SERPL-MCNC: 0.72 MG/DL (ref 0.5–0.9)
DIFFERENTIAL TYPE: ABNORMAL
EOSINOPHILS RELATIVE PERCENT: 1 % (ref 1–7)
GFR AFRICAN AMERICAN: >60 ML/MIN
GFR NON-AFRICAN AMERICAN: >60 ML/MIN
GFR SERPL CREATININE-BSD FRML MDRD: ABNORMAL ML/MIN/{1.73_M2}
GFR SERPL CREATININE-BSD FRML MDRD: ABNORMAL ML/MIN/{1.73_M2}
GLUCOSE BLD-MCNC: 125 MG/DL (ref 70–99)
HCT VFR BLD CALC: 46.7 % (ref 36–46)
HEMOGLOBIN: 15.4 G/DL (ref 12–16)
IMMATURE GRANULOCYTES: ABNORMAL %
LYMPHOCYTES # BLD: 14 % (ref 16–46)
MCH RBC QN AUTO: 29.1 PG (ref 26–34)
MCHC RBC AUTO-ENTMCNC: 32.9 G/DL (ref 31–37)
MCV RBC AUTO: 88.6 FL (ref 80–100)
MONOCYTES # BLD: 6 % (ref 4–11)
NRBC AUTOMATED: ABNORMAL PER 100 WBC
PDW BLD-RTO: 14.2 % (ref 11–14.5)
PLATELET # BLD: 323 K/UL (ref 140–450)
PLATELET ESTIMATE: ABNORMAL
PMV BLD AUTO: 7.1 FL (ref 6–12)
POTASSIUM SERPL-SCNC: 3.6 MMOL/L (ref 3.7–5.3)
RBC # BLD: 5.27 M/UL (ref 4–5.2)
RBC # BLD: ABNORMAL 10*6/UL
SEG NEUTROPHILS: 78 % (ref 43–77)
SEGMENTED NEUTROPHILS ABSOLUTE COUNT: 11.7 K/UL (ref 1.8–7.7)
SODIUM BLD-SCNC: 140 MMOL/L (ref 135–144)
WBC # BLD: 14.7 K/UL (ref 3.5–11)
WBC # BLD: ABNORMAL 10*3/UL

## 2019-08-09 PROCEDURE — 6360000002 HC RX W HCPCS: Performed by: INTERNAL MEDICINE

## 2019-08-09 PROCEDURE — 71045 X-RAY EXAM CHEST 1 VIEW: CPT

## 2019-08-09 PROCEDURE — 96376 TX/PRO/DX INJ SAME DRUG ADON: CPT

## 2019-08-09 PROCEDURE — 85025 COMPLETE CBC W/AUTO DIFF WBC: CPT

## 2019-08-09 PROCEDURE — 80048 BASIC METABOLIC PNL TOTAL CA: CPT

## 2019-08-09 PROCEDURE — 6370000000 HC RX 637 (ALT 250 FOR IP): Performed by: INTERNAL MEDICINE

## 2019-08-09 PROCEDURE — 2500000003 HC RX 250 WO HCPCS: Performed by: NURSE PRACTITIONER

## 2019-08-09 PROCEDURE — G0378 HOSPITAL OBSERVATION PER HR: HCPCS

## 2019-08-09 PROCEDURE — 96372 THER/PROPH/DIAG INJ SC/IM: CPT

## 2019-08-09 PROCEDURE — 36415 COLL VENOUS BLD VENIPUNCTURE: CPT

## 2019-08-09 PROCEDURE — 96366 THER/PROPH/DIAG IV INF ADDON: CPT

## 2019-08-09 PROCEDURE — 6360000002 HC RX W HCPCS: Performed by: NURSE PRACTITIONER

## 2019-08-09 PROCEDURE — 99232 SBSQ HOSP IP/OBS MODERATE 35: CPT | Performed by: INTERNAL MEDICINE

## 2019-08-09 PROCEDURE — 82947 ASSAY GLUCOSE BLOOD QUANT: CPT

## 2019-08-09 PROCEDURE — 6360000002 HC RX W HCPCS: Performed by: EMERGENCY MEDICINE

## 2019-08-09 PROCEDURE — 2580000003 HC RX 258: Performed by: EMERGENCY MEDICINE

## 2019-08-09 PROCEDURE — 96375 TX/PRO/DX INJ NEW DRUG ADDON: CPT

## 2019-08-09 RX ORDER — LORAZEPAM 2 MG/ML
0.5 INJECTION INTRAMUSCULAR ONCE
Status: COMPLETED | OUTPATIENT
Start: 2019-08-09 | End: 2019-08-09

## 2019-08-09 RX ORDER — POTASSIUM CHLORIDE 20 MEQ/1
40 TABLET, EXTENDED RELEASE ORAL ONCE
Status: DISCONTINUED | OUTPATIENT
Start: 2019-08-09 | End: 2019-08-11 | Stop reason: HOSPADM

## 2019-08-09 RX ORDER — LORAZEPAM 1 MG/1
TABLET ORAL
Status: DISCONTINUED
Start: 2019-08-09 | End: 2019-08-09 | Stop reason: WASHOUT

## 2019-08-09 RX ORDER — LORAZEPAM 2 MG/ML
1 INJECTION INTRAMUSCULAR ONCE
Status: COMPLETED | OUTPATIENT
Start: 2019-08-09 | End: 2019-08-09

## 2019-08-09 RX ORDER — RISPERIDONE 1 MG/1
1 TABLET, FILM COATED ORAL 2 TIMES DAILY
Status: DISCONTINUED | OUTPATIENT
Start: 2019-08-09 | End: 2019-08-10

## 2019-08-09 RX ADMIN — RISPERIDONE 1 MG: 1 TABLET ORAL at 14:57

## 2019-08-09 RX ADMIN — METOPROLOL TARTRATE 25 MG: 25 TABLET ORAL at 09:06

## 2019-08-09 RX ADMIN — ATORVASTATIN CALCIUM 20 MG: 10 TABLET, FILM COATED ORAL at 20:38

## 2019-08-09 RX ADMIN — LORAZEPAM 1 MG: 2 INJECTION INTRAMUSCULAR; INTRAVENOUS at 12:54

## 2019-08-09 RX ADMIN — METOPROLOL TARTRATE 5 MG: 5 INJECTION INTRAVENOUS at 03:33

## 2019-08-09 RX ADMIN — POTASSIUM CHLORIDE AND SODIUM CHLORIDE: 900; 150 INJECTION, SOLUTION INTRAVENOUS at 18:19

## 2019-08-09 RX ADMIN — RISPERIDONE 1 MG: 1 TABLET ORAL at 20:38

## 2019-08-09 RX ADMIN — POTASSIUM CHLORIDE AND SODIUM CHLORIDE: 900; 150 INJECTION, SOLUTION INTRAVENOUS at 01:26

## 2019-08-09 RX ADMIN — BENZTROPINE MESYLATE 0.5 MG: 1 TABLET ORAL at 12:29

## 2019-08-09 RX ADMIN — BETHANECHOL CHLORIDE 25 MG: 25 TABLET ORAL at 20:38

## 2019-08-09 RX ADMIN — LORAZEPAM 0.5 MG: 2 INJECTION INTRAMUSCULAR; INTRAVENOUS at 13:31

## 2019-08-09 RX ADMIN — METOPROLOL TARTRATE 5 MG: 5 INJECTION INTRAVENOUS at 13:22

## 2019-08-09 RX ADMIN — METOPROLOL TARTRATE 5 MG: 5 INJECTION INTRAVENOUS at 18:09

## 2019-08-09 RX ADMIN — TRAZODONE HYDROCHLORIDE 100 MG: 50 TABLET ORAL at 20:38

## 2019-08-09 RX ADMIN — METOPROLOL TARTRATE 5 MG: 5 INJECTION INTRAVENOUS at 09:18

## 2019-08-09 RX ADMIN — ENOXAPARIN SODIUM 40 MG: 40 INJECTION SUBCUTANEOUS at 09:11

## 2019-08-09 RX ADMIN — POTASSIUM CHLORIDE AND SODIUM CHLORIDE: 900; 150 INJECTION, SOLUTION INTRAVENOUS at 10:40

## 2019-08-09 RX ADMIN — INSULIN GLARGINE 55 UNITS: 100 INJECTION, SOLUTION SUBCUTANEOUS at 20:36

## 2019-08-09 RX ADMIN — CEFEPIME HYDROCHLORIDE 2 G: 2 INJECTION, POWDER, FOR SOLUTION INTRAVENOUS at 03:21

## 2019-08-09 RX ADMIN — HYDRALAZINE HYDROCHLORIDE 10 MG: 20 INJECTION INTRAMUSCULAR; INTRAVENOUS at 04:08

## 2019-08-09 RX ADMIN — CEFEPIME HYDROCHLORIDE 2 G: 2 INJECTION, POWDER, FOR SOLUTION INTRAVENOUS at 14:57

## 2019-08-09 RX ADMIN — METOPROLOL TARTRATE 25 MG: 25 TABLET ORAL at 20:38

## 2019-08-09 ASSESSMENT — PAIN SCALES - WONG BAKER
WONGBAKER_NUMERICALRESPONSE: 0

## 2019-08-09 ASSESSMENT — PAIN SCALES - GENERAL
PAINLEVEL_OUTOF10: 0

## 2019-08-09 NOTE — PROGRESS NOTES
Hospitalist Progress Note    Patient:  Adilene Mcgee     YOB: 1960    MRN: [id number]   Admit date: 8/6/2019     Acct: [de-identified]     PCP: DESIREE Bennett - CNP    CC--Interval History:   Pneumonia----on Cefepime IV----med nebs----RA O2sat = 95%    Psychiatric---Schizophrenia---being more alert and interactive, but also more agitated---babbling--starting Risperdal---Cogentin restarted    DM2---BG = 125    WBC   18.1 ---> 14.7    K = 3.6    HTN---restarted Lopressor 25 BID---if patient will not take PO, then IV 5 mg    See note below         All other ROS negative except noted in HPI    Diet:  DIET CARB CONTROL; Carb Control: 4 carb choices (60 gms)/meal    Medications:  Scheduled Meds:   potassium chloride  40 mEq Oral Once    metoprolol tartrate  25 mg Oral BID    LORazepam  1 mg Intravenous Once    insulin glargine  55 Units Subcutaneous Nightly    insulin lispro  7 Units Subcutaneous TID WC    benztropine  0.5 mg Oral Daily    bethanechol  25 mg Oral 4x Daily    cefepime  2 g Intravenous Q12H    atorvastatin  20 mg Oral Daily    traZODone  100 mg Oral Nightly    sodium chloride flush  10 mL Intravenous 2 times per day    enoxaparin  40 mg Subcutaneous Daily    nicotine  1 patch Transdermal Daily    insulin lispro  0-12 Units Subcutaneous TID WC    insulin lispro  0-6 Units Subcutaneous Nightly     Continuous Infusions:   0.9% NaCl with KCl 20 mEq 125 mL/hr at 08/09/19 1040    dextrose       PRN Meds:acetaminophen, metoprolol, hydrALAZINE, hydrALAZINE, sodium chloride flush, acetaminophen, glucose, dextrose, glucagon (rDNA), dextrose    Objective:  Labs:  CBC with Differential:    Lab Results   Component Value Date    WBC 14.7 08/09/2019    RBC 5.27 08/09/2019    HGB 15.4 08/09/2019    HCT 46.7 08/09/2019     08/09/2019    MCV 88.6 08/09/2019    MCH 29.1 08/09/2019    MCHC 32.9 08/09/2019    RDW 14.2 08/09/2019    LYMPHOPCT 14 08/09/2019    MONOPCT 6 08/09/2019 steroids]    Pneumonia---8. 6.2019---left-sided  Altered mental status--8. 6.2019--increasing confusion at home         CXR----8.8.2019--mild central congestion---LLL opacity          EKG--8.7.2019---NSR---90---sinus arrhythmia--NACs         CXR---8.7.2019--low lung volumes--LLL-lingula atelectasis--vs--evolving pneumonia         CXR---8.6.2019---[-]         CT head---8. 6.2019---no MBS--senescent finding--chronic microvascular change         EKG---8.6.2019--NSR---91--NSSTTCs  Schizophrenia---depression -episodic outburst hitting bed--screaming---otherwise laconic--ignores  Diabetes Mellitus Type 2  Hypothyroidism---elevated FT4  Tobacco abuse---1 PPD x 40 years--current daily  Urinary retention----juan c--8. 7.2019  Hypokalemia   leukocytosis  PMH:  see above   PSH:  none listed    Allergies:  Bicillin--Penicillin G---dyspnea, Geodon--ziprasidone      Plan:  1. Pneumonia---Cefepime IV---med nebs---O2 as required  2. Schizophrenia ---> Risperdal starter dose  3. DM2---current regimen  4. Tobacco abuse--cessation--nicotine patch  5. Potassium replacement  6.   See orders     Electronically signed by Tarri Severance on 8/9/2019 at 12:53 PM    Hospitalist

## 2019-08-09 NOTE — PROGRESS NOTES
Writer to room to give pt medications. Patient awake and talking with nurse. Patient appears calm. When asked patient states she does take trazodone and risperdal. When asked if the patient would like anything patient states \"water\". Patient able to drink some water and eat pudding with help of nurse.

## 2019-08-09 NOTE — PROGRESS NOTES
Patient slamming arms against bed repeatedly crying \"bring me my baby back\". Writer and orientee unable to console patient or reorient. Pt heart rate 150s. Dr. Lisa Costello notifed.

## 2019-08-10 ENCOUNTER — APPOINTMENT (OUTPATIENT)
Dept: CT IMAGING | Age: 59
End: 2019-08-10
Payer: MEDICARE

## 2019-08-10 ENCOUNTER — APPOINTMENT (OUTPATIENT)
Dept: GENERAL RADIOLOGY | Age: 59
End: 2019-08-10
Payer: MEDICARE

## 2019-08-10 LAB
ABSOLUTE EOS #: 0.2 K/UL (ref 0–0.4)
ABSOLUTE IMMATURE GRANULOCYTE: ABNORMAL K/UL (ref 0–0.3)
ABSOLUTE LYMPH #: 2.5 K/UL (ref 1–4.8)
ABSOLUTE MONO #: 1 K/UL (ref 0.1–1.2)
AMPHETAMINE SCREEN URINE: NEGATIVE
ANION GAP SERPL CALCULATED.3IONS-SCNC: 11 MMOL/L (ref 9–17)
BARBITURATE SCREEN URINE: NEGATIVE
BASOPHILS # BLD: 1 % (ref 0–1)
BASOPHILS ABSOLUTE: 0.1 K/UL (ref 0–0.2)
BENZODIAZEPINE SCREEN, URINE: POSITIVE
BUN BLDV-MCNC: 14 MG/DL (ref 6–20)
BUN/CREAT BLD: 18 (ref 9–20)
BUPRENORPHINE URINE: NEGATIVE
CALCIUM SERPL-MCNC: 8.8 MG/DL (ref 8.6–10.4)
CANNABINOID SCREEN URINE: NEGATIVE
CHLORIDE BLD-SCNC: 111 MMOL/L (ref 98–107)
CO2: 20 MMOL/L (ref 20–31)
COCAINE METABOLITE, URINE: NEGATIVE
CREAT SERPL-MCNC: 0.77 MG/DL (ref 0.5–0.9)
DIFFERENTIAL TYPE: ABNORMAL
EOSINOPHILS RELATIVE PERCENT: 1 % (ref 1–7)
GFR AFRICAN AMERICAN: >60 ML/MIN
GFR NON-AFRICAN AMERICAN: >60 ML/MIN
GFR SERPL CREATININE-BSD FRML MDRD: ABNORMAL ML/MIN/{1.73_M2}
GFR SERPL CREATININE-BSD FRML MDRD: ABNORMAL ML/MIN/{1.73_M2}
GLUCOSE BLD-MCNC: 87 MG/DL (ref 70–99)
HCT VFR BLD CALC: 39.8 % (ref 36–46)
HEMOGLOBIN: 13.1 G/DL (ref 12–16)
IMMATURE GRANULOCYTES: ABNORMAL %
LACTIC ACID: 0.7 MMOL/L (ref 0.5–2.2)
LYMPHOCYTES # BLD: 21 % (ref 16–46)
MCH RBC QN AUTO: 29.3 PG (ref 26–34)
MCHC RBC AUTO-ENTMCNC: 33 G/DL (ref 31–37)
MCV RBC AUTO: 88.8 FL (ref 80–100)
MDMA URINE: ABNORMAL
METHADONE SCREEN, URINE: NEGATIVE
METHAMPHETAMINE, URINE: NEGATIVE
MONOCYTES # BLD: 8 % (ref 4–11)
NRBC AUTOMATED: ABNORMAL PER 100 WBC
OPIATES, URINE: NEGATIVE
OXYCODONE SCREEN URINE: NEGATIVE
PDW BLD-RTO: 14.1 % (ref 11–14.5)
PHENCYCLIDINE, URINE: NEGATIVE
PLATELET # BLD: 270 K/UL (ref 140–450)
PLATELET ESTIMATE: ABNORMAL
PMV BLD AUTO: 7.3 FL (ref 6–12)
POTASSIUM SERPL-SCNC: 3.7 MMOL/L (ref 3.7–5.3)
PROPOXYPHENE, URINE: NEGATIVE
RBC # BLD: 4.48 M/UL (ref 4–5.2)
RBC # BLD: ABNORMAL 10*6/UL
SEG NEUTROPHILS: 69 % (ref 43–77)
SEGMENTED NEUTROPHILS ABSOLUTE COUNT: 8 K/UL (ref 1.8–7.7)
SODIUM BLD-SCNC: 142 MMOL/L (ref 135–144)
TEST INFORMATION: ABNORMAL
TRICYCLIC ANTIDEPRESSANTS, UR: NEGATIVE
WBC # BLD: 11.7 K/UL (ref 3.5–11)
WBC # BLD: ABNORMAL 10*3/UL

## 2019-08-10 PROCEDURE — 80306 DRUG TEST PRSMV INSTRMNT: CPT

## 2019-08-10 PROCEDURE — 83605 ASSAY OF LACTIC ACID: CPT

## 2019-08-10 PROCEDURE — 96366 THER/PROPH/DIAG IV INF ADDON: CPT

## 2019-08-10 PROCEDURE — 74176 CT ABD & PELVIS W/O CONTRAST: CPT

## 2019-08-10 PROCEDURE — 6370000000 HC RX 637 (ALT 250 FOR IP): Performed by: INTERNAL MEDICINE

## 2019-08-10 PROCEDURE — 6360000002 HC RX W HCPCS: Performed by: INTERNAL MEDICINE

## 2019-08-10 PROCEDURE — 6360000002 HC RX W HCPCS: Performed by: EMERGENCY MEDICINE

## 2019-08-10 PROCEDURE — 74018 RADEX ABDOMEN 1 VIEW: CPT

## 2019-08-10 PROCEDURE — 99221 1ST HOSP IP/OBS SF/LOW 40: CPT | Performed by: SURGERY

## 2019-08-10 PROCEDURE — 85025 COMPLETE CBC W/AUTO DIFF WBC: CPT

## 2019-08-10 PROCEDURE — 99232 SBSQ HOSP IP/OBS MODERATE 35: CPT | Performed by: FAMILY MEDICINE

## 2019-08-10 PROCEDURE — 2580000003 HC RX 258: Performed by: EMERGENCY MEDICINE

## 2019-08-10 PROCEDURE — 70450 CT HEAD/BRAIN W/O DYE: CPT

## 2019-08-10 PROCEDURE — 80048 BASIC METABOLIC PNL TOTAL CA: CPT

## 2019-08-10 PROCEDURE — G0378 HOSPITAL OBSERVATION PER HR: HCPCS

## 2019-08-10 PROCEDURE — 74022 RADEX COMPL AQT ABD SERIES: CPT

## 2019-08-10 PROCEDURE — 2580000003 HC RX 258: Performed by: INTERNAL MEDICINE

## 2019-08-10 PROCEDURE — 6370000000 HC RX 637 (ALT 250 FOR IP): Performed by: SURGERY

## 2019-08-10 PROCEDURE — 2500000003 HC RX 250 WO HCPCS: Performed by: FAMILY MEDICINE

## 2019-08-10 PROCEDURE — 2500000003 HC RX 250 WO HCPCS: Performed by: NURSE PRACTITIONER

## 2019-08-10 PROCEDURE — 36415 COLL VENOUS BLD VENIPUNCTURE: CPT

## 2019-08-10 PROCEDURE — 96372 THER/PROPH/DIAG INJ SC/IM: CPT

## 2019-08-10 PROCEDURE — 96376 TX/PRO/DX INJ SAME DRUG ADON: CPT

## 2019-08-10 RX ORDER — BISACODYL 10 MG
10 SUPPOSITORY, RECTAL RECTAL ONCE
Status: COMPLETED | OUTPATIENT
Start: 2019-08-10 | End: 2019-08-10

## 2019-08-10 RX ORDER — DEXTROSE, SODIUM CHLORIDE, AND POTASSIUM CHLORIDE 5; .9; .15 G/100ML; G/100ML; G/100ML
INJECTION INTRAVENOUS CONTINUOUS
Status: DISCONTINUED | OUTPATIENT
Start: 2019-08-10 | End: 2019-08-11 | Stop reason: HOSPADM

## 2019-08-10 RX ADMIN — CEFEPIME HYDROCHLORIDE 2 G: 2 INJECTION, POWDER, FOR SOLUTION INTRAVENOUS at 15:39

## 2019-08-10 RX ADMIN — BETHANECHOL CHLORIDE 25 MG: 25 TABLET ORAL at 08:23

## 2019-08-10 RX ADMIN — DEXTROSE 50 % IN WATER (D50W) INTRAVENOUS SYRINGE 12.5 G: at 13:18

## 2019-08-10 RX ADMIN — METOPROLOL TARTRATE 5 MG: 5 INJECTION INTRAVENOUS at 20:17

## 2019-08-10 RX ADMIN — METOPROLOL TARTRATE 25 MG: 25 TABLET ORAL at 08:23

## 2019-08-10 RX ADMIN — POTASSIUM CHLORIDE, DEXTROSE MONOHYDRATE AND SODIUM CHLORIDE: 150; 5; 900 INJECTION, SOLUTION INTRAVENOUS at 22:14

## 2019-08-10 RX ADMIN — POTASSIUM CHLORIDE AND SODIUM CHLORIDE: 900; 150 INJECTION, SOLUTION INTRAVENOUS at 02:31

## 2019-08-10 RX ADMIN — BISACODYL 10 MG: 10 SUPPOSITORY RECTAL at 17:30

## 2019-08-10 RX ADMIN — POTASSIUM CHLORIDE, DEXTROSE MONOHYDRATE AND SODIUM CHLORIDE: 150; 5; 900 INJECTION, SOLUTION INTRAVENOUS at 13:28

## 2019-08-10 RX ADMIN — RISPERIDONE 1 MG: 1 TABLET ORAL at 08:23

## 2019-08-10 RX ADMIN — CEFEPIME HYDROCHLORIDE 2 G: 2 INJECTION, POWDER, FOR SOLUTION INTRAVENOUS at 02:31

## 2019-08-10 RX ADMIN — BENZTROPINE MESYLATE 0.5 MG: 1 TABLET ORAL at 08:23

## 2019-08-10 RX ADMIN — ENOXAPARIN SODIUM 40 MG: 40 INJECTION SUBCUTANEOUS at 08:23

## 2019-08-10 ASSESSMENT — PAIN SCALES - WONG BAKER
WONGBAKER_NUMERICALRESPONSE: 0

## 2019-08-10 ASSESSMENT — PAIN SCALES - GENERAL
PAINLEVEL_OUTOF10: 0
PAINLEVEL_OUTOF10: 0

## 2019-08-10 NOTE — PROGRESS NOTES
Hospitalist Progress Note  8/10/2019 8:55 AM  Subjective:   Admit Date: 8/6/2019  PCP: Mi Preston, APRN - CNP    Interval History: Patient shania treted for pneumonia,h/o psychiatric disorder with psychotic episodes as per nursing was started on Risperdal yesterday. CXR looks stable,wbc count improved  Diet: DIET CARB CONTROL; Carb Control: 4 carb choices (60 gms)/meal  Medications:   Scheduled Meds:   potassium chloride  40 mEq Oral Once    metoprolol tartrate  25 mg Oral BID    risperiDONE  1 mg Oral BID    insulin glargine  55 Units Subcutaneous Nightly    insulin lispro  7 Units Subcutaneous TID WC    benztropine  0.5 mg Oral Daily    bethanechol  25 mg Oral 4x Daily    cefepime  2 g Intravenous Q12H    atorvastatin  20 mg Oral Daily    traZODone  100 mg Oral Nightly    sodium chloride flush  10 mL Intravenous 2 times per day    enoxaparin  40 mg Subcutaneous Daily    nicotine  1 patch Transdermal Daily    insulin lispro  0-12 Units Subcutaneous TID WC    insulin lispro  0-6 Units Subcutaneous Nightly     Continuous Infusions:   0.9% NaCl with KCl 20 mEq 125 mL/hr at 08/10/19 0231    dextrose       CBC:   Recent Labs     08/08/19  0516 08/09/19  0808 08/10/19  0546   WBC 18.1* 14.7* 11.7*   HGB 15.6 15.4 13.1    323 270     BMP:    Recent Labs     08/08/19  0516 08/09/19  0808 08/10/19  0546    140 142   K 3.3* 3.6* 3.7    107 111*   CO2 21 19* 20   BUN 10 13 14   CREATININE 0.66 0.72 0.77   GLUCOSE 148* 125* 87     Hepatic: No results for input(s): AST, ALT, ALB, BILITOT, ALKPHOS in the last 72 hours. Troponin: No results for input(s): TROPONINI in the last 72 hours. BNP: No results for input(s): BNP in the last 72 hours. Lipids: No results for input(s): CHOL, HDL in the last 72 hours. Invalid input(s): LDLCALCU  INR: No results for input(s): INR in the last 72 hours.       Objective:   Vitals: BP (!) 198/93   Pulse 118   Temp 98.9 °F (37.2 °C) (Tympanic)

## 2019-08-10 NOTE — CONSULTS
General Surgery   Consultation        PATIENT NAME: Cammy Rick   YOB: 1960    ADMISSION DATE: 8/6/2019 10:47 AM     Admitting Provider: Akua Cohen Physician: Kevin Mace DATE: 8/10/2019    Consult Regarding:  Bowel obstruction    HISTORY OF PRESENT ILLNESS:  The patient is a 62 y.o. female  who presented to hospital on 8/6 after being found down by a  at home. At initial evaluation she was found to have fever, mental status changes and gait instability according to records. She was admitted to the medical service and there was concern she had stopped taking some of her psychiatric medications. She has since been started on cogentin and risperdal but despite this it seems the patients mental status has not significantly changed since her admission. Per report from nursing staff pt has been non verbal for much of her admission. She has not been eating or drinking since admission. Unfortunately, the patient is not able to give any history at this time. Starting today she was noted to have abdominal distention and Acute abd series was obtained that showed dilation of multiple loops of bowel as swell as colonic distention. She had a subsequent CT without contrast that again shows colonic distention with air throughout the colon down to and including the rectum with some fecal material present. There is no clear evidence of obstructing mass. There is some concern for possible pneumatosis but no portal gas is seen. On report from nursing it is unclear when pt had last bowel movement and it does not seem the patient has been passing flatus at least for the past 24-48 hours. Review of available records shows hx of cholecystectomy as well as tubal ligation and possibly another abdominal surgery but no further information is available. General surgery is consulted for evaluation of possible bowel obstruction.     Past Medical History:        Diagnosis Date    dysfunction  NEUROLOGIC:  Mental Status Exam:  Level of Alertness:   alert  Orientation:   oriented to person, place, and time    CBC:   Lab Results   Component Value Date    WBC 11.7 08/10/2019    RBC 4.48 08/10/2019    HGB 13.1 08/10/2019    HCT 39.8 08/10/2019    MCV 88.8 08/10/2019    MCH 29.3 08/10/2019    MCHC 33.0 08/10/2019    RDW 14.1 08/10/2019     08/10/2019    MPV 7.3 08/10/2019     BMP:    Lab Results   Component Value Date     08/10/2019    K 3.7 08/10/2019     08/10/2019    CO2 20 08/10/2019    BUN 14 08/10/2019    LABALBU 4.2 08/24/2018    CREATININE 0.77 08/10/2019    CALCIUM 8.8 08/10/2019    GFRAA >60 08/10/2019    LABGLOM >60 08/10/2019    GLUCOSE 87 08/10/2019     Lactic Acid:  0.7    Pertinent Radiology: Acute abd series  Images personally reviewed. There is significant colonic distention especially at cecum. Air appears to be present all the way to rectum. No evidence of free air. Radiologists read is noted. CT abd pel  Images personally reviewed. Again there appears to be significant colonic distention with small bowel that appears mostly decompressed. There is a small area possibly showing pneumatosis. No portal venous gas. No evidence of perforation. No significant pericolonic inflammatory changes noted. Radiologists read is noted. ASSESSMENT   Patient Active Problem List   Diagnosis    Altered mental status     61 yo female with colonic distention, concern for bowel obstruction. I have high suspicion for colonic pseudo obstruction most likely related to her cogentin and risperdal  Altered mental status - cause not immediately clear  Hx of depression and possible schizophrenia    PLAN    1. After review of the case I have low suspicion for any type of mechanical obstruction. CT does not show any evidence of obstructing colon mass and pt has significant air and some stool present in distal colon and rectum.   Additionally, pt has normal lactic acid,

## 2019-08-10 NOTE — PROGRESS NOTES
Dr. Kathey Bourbon called to floor to speak with charge nurse Iman Loredo and states it is ok to dc Risperdal for now.

## 2019-08-11 ENCOUNTER — HOSPITAL ENCOUNTER (INPATIENT)
Age: 59
LOS: 18 days | Discharge: NURSING, REHAB OR LONG-TERM CARE | DRG: 388 | End: 2019-08-29
Attending: INTERNAL MEDICINE | Admitting: INTERNAL MEDICINE
Payer: MEDICARE

## 2019-08-11 ENCOUNTER — APPOINTMENT (OUTPATIENT)
Dept: GENERAL RADIOLOGY | Age: 59
End: 2019-08-11
Payer: MEDICARE

## 2019-08-11 VITALS
SYSTOLIC BLOOD PRESSURE: 168 MMHG | WEIGHT: 188.2 LBS | HEART RATE: 90 BPM | TEMPERATURE: 98.3 F | BODY MASS INDEX: 31.36 KG/M2 | OXYGEN SATURATION: 93 % | DIASTOLIC BLOOD PRESSURE: 83 MMHG | RESPIRATION RATE: 20 BRPM | HEIGHT: 65 IN

## 2019-08-11 PROBLEM — G92.8 TOXIC METABOLIC ENCEPHALOPATHY: Status: ACTIVE | Noted: 2019-08-06

## 2019-08-11 PROBLEM — F20.9 SCHIZOPHRENIA (HCC): Status: ACTIVE | Noted: 2019-08-11

## 2019-08-11 PROBLEM — E03.8 OTHER SPECIFIED HYPOTHYROIDISM: Status: ACTIVE | Noted: 2019-08-11

## 2019-08-11 PROBLEM — E11.9 CONTROLLED TYPE 2 DIABETES MELLITUS WITHOUT COMPLICATION, WITHOUT LONG-TERM CURRENT USE OF INSULIN (HCC): Status: ACTIVE | Noted: 2019-08-11

## 2019-08-11 PROBLEM — Z79.4 CONTROLLED TYPE 2 DIABETES MELLITUS WITHOUT COMPLICATION, WITH LONG-TERM CURRENT USE OF INSULIN (HCC): Status: ACTIVE | Noted: 2019-08-11

## 2019-08-11 PROBLEM — E78.5 DYSLIPIDEMIA: Status: ACTIVE | Noted: 2019-08-11

## 2019-08-11 PROBLEM — J15.9 BACTERIAL PNEUMONIA: Status: ACTIVE | Noted: 2019-08-11

## 2019-08-11 PROBLEM — J18.9 PNEUMONIA: Status: ACTIVE | Noted: 2019-08-11

## 2019-08-11 LAB
ABSOLUTE EOS #: 0.2 K/UL (ref 0–0.4)
ABSOLUTE IMMATURE GRANULOCYTE: ABNORMAL K/UL (ref 0–0.3)
ABSOLUTE LYMPH #: 1.4 K/UL (ref 1–4.8)
ABSOLUTE MONO #: 0.8 K/UL (ref 0.1–1.2)
ANION GAP SERPL CALCULATED.3IONS-SCNC: 11 MMOL/L (ref 9–17)
BASOPHILS # BLD: 1 % (ref 0–1)
BASOPHILS ABSOLUTE: 0.1 K/UL (ref 0–0.2)
BUN BLDV-MCNC: 10 MG/DL (ref 6–20)
BUN/CREAT BLD: 17 (ref 9–20)
C-REACTIVE PROTEIN: 23.1 MG/L (ref 0–5)
CALCIUM SERPL-MCNC: 8.9 MG/DL (ref 8.6–10.4)
CHLORIDE BLD-SCNC: 106 MMOL/L (ref 98–107)
CO2: 25 MMOL/L (ref 20–31)
CREAT SERPL-MCNC: 0.59 MG/DL (ref 0.5–0.9)
DIFFERENTIAL TYPE: ABNORMAL
EOSINOPHILS RELATIVE PERCENT: 2 % (ref 1–7)
GFR AFRICAN AMERICAN: >60 ML/MIN
GFR NON-AFRICAN AMERICAN: >60 ML/MIN
GFR SERPL CREATININE-BSD FRML MDRD: ABNORMAL ML/MIN/{1.73_M2}
GFR SERPL CREATININE-BSD FRML MDRD: ABNORMAL ML/MIN/{1.73_M2}
GLUCOSE BLD-MCNC: 182 MG/DL (ref 70–99)
HCT VFR BLD CALC: 42.8 % (ref 36–46)
HEMOGLOBIN: 14.3 G/DL (ref 12–16)
IMMATURE GRANULOCYTES: ABNORMAL %
LYMPHOCYTES # BLD: 14 % (ref 16–46)
MAGNESIUM: 2.1 MG/DL (ref 1.6–2.6)
MCH RBC QN AUTO: 29.5 PG (ref 26–34)
MCHC RBC AUTO-ENTMCNC: 33.5 G/DL (ref 31–37)
MCV RBC AUTO: 87.9 FL (ref 80–100)
MONOCYTES # BLD: 8 % (ref 4–11)
NRBC AUTOMATED: ABNORMAL PER 100 WBC
PDW BLD-RTO: 14.1 % (ref 11–14.5)
PLATELET # BLD: 255 K/UL (ref 140–450)
PLATELET ESTIMATE: ABNORMAL
PMV BLD AUTO: 6.9 FL (ref 6–12)
POTASSIUM SERPL-SCNC: 3.4 MMOL/L (ref 3.7–5.3)
PROCALCITONIN: 0.08 NG/ML
RBC # BLD: 4.87 M/UL (ref 4–5.2)
RBC # BLD: ABNORMAL 10*6/UL
SEDIMENTATION RATE, ERYTHROCYTE: 19 MM (ref 0–20)
SEG NEUTROPHILS: 75 % (ref 43–77)
SEGMENTED NEUTROPHILS ABSOLUTE COUNT: 7.6 K/UL (ref 1.8–7.7)
SODIUM BLD-SCNC: 142 MMOL/L (ref 135–144)
WBC # BLD: 10.1 K/UL (ref 3.5–11)
WBC # BLD: ABNORMAL 10*3/UL

## 2019-08-11 PROCEDURE — 80048 BASIC METABOLIC PNL TOTAL CA: CPT

## 2019-08-11 PROCEDURE — 99232 SBSQ HOSP IP/OBS MODERATE 35: CPT | Performed by: FAMILY MEDICINE

## 2019-08-11 PROCEDURE — 99223 1ST HOSP IP/OBS HIGH 75: CPT | Performed by: INTERNAL MEDICINE

## 2019-08-11 PROCEDURE — G0378 HOSPITAL OBSERVATION PER HR: HCPCS

## 2019-08-11 PROCEDURE — 36415 COLL VENOUS BLD VENIPUNCTURE: CPT

## 2019-08-11 PROCEDURE — 6360000002 HC RX W HCPCS: Performed by: INTERNAL MEDICINE

## 2019-08-11 PROCEDURE — 2500000003 HC RX 250 WO HCPCS: Performed by: FAMILY MEDICINE

## 2019-08-11 PROCEDURE — 83735 ASSAY OF MAGNESIUM: CPT

## 2019-08-11 PROCEDURE — 2580000003 HC RX 258: Performed by: INTERNAL MEDICINE

## 2019-08-11 PROCEDURE — 6360000002 HC RX W HCPCS: Performed by: EMERGENCY MEDICINE

## 2019-08-11 PROCEDURE — 6360000002 HC RX W HCPCS: Performed by: STUDENT IN AN ORGANIZED HEALTH CARE EDUCATION/TRAINING PROGRAM

## 2019-08-11 PROCEDURE — 85025 COMPLETE CBC W/AUTO DIFF WBC: CPT

## 2019-08-11 PROCEDURE — 99232 SBSQ HOSP IP/OBS MODERATE 35: CPT | Performed by: SURGERY

## 2019-08-11 PROCEDURE — 84145 PROCALCITONIN (PCT): CPT

## 2019-08-11 PROCEDURE — 86140 C-REACTIVE PROTEIN: CPT

## 2019-08-11 PROCEDURE — 2000000000 HC ICU R&B

## 2019-08-11 PROCEDURE — 6370000000 HC RX 637 (ALT 250 FOR IP): Performed by: INTERNAL MEDICINE

## 2019-08-11 PROCEDURE — 96372 THER/PROPH/DIAG INJ SC/IM: CPT

## 2019-08-11 PROCEDURE — 6370000000 HC RX 637 (ALT 250 FOR IP): Performed by: NURSE PRACTITIONER

## 2019-08-11 PROCEDURE — 2580000003 HC RX 258: Performed by: EMERGENCY MEDICINE

## 2019-08-11 PROCEDURE — 96366 THER/PROPH/DIAG IV INF ADDON: CPT

## 2019-08-11 PROCEDURE — 74018 RADEX ABDOMEN 1 VIEW: CPT

## 2019-08-11 PROCEDURE — 85651 RBC SED RATE NONAUTOMATED: CPT

## 2019-08-11 RX ORDER — TRAZODONE HYDROCHLORIDE 100 MG/1
100 TABLET ORAL NIGHTLY
Status: DISCONTINUED | OUTPATIENT
Start: 2019-08-11 | End: 2019-08-29 | Stop reason: HOSPADM

## 2019-08-11 RX ORDER — NICOTINE 21 MG/24HR
1 PATCH, TRANSDERMAL 24 HOURS TRANSDERMAL DAILY PRN
Status: DISCONTINUED | OUTPATIENT
Start: 2019-08-11 | End: 2019-08-29 | Stop reason: HOSPADM

## 2019-08-11 RX ORDER — ATORVASTATIN CALCIUM 20 MG/1
20 TABLET, FILM COATED ORAL DAILY
Status: DISCONTINUED | OUTPATIENT
Start: 2019-08-11 | End: 2019-08-29 | Stop reason: HOSPADM

## 2019-08-11 RX ORDER — DIPHENHYDRAMINE HCL 50 MG
50 CAPSULE ORAL EVERY 6 HOURS PRN
Status: DISCONTINUED | OUTPATIENT
Start: 2019-08-11 | End: 2019-08-29 | Stop reason: HOSPADM

## 2019-08-11 RX ORDER — SODIUM CHLORIDE 0.9 % (FLUSH) 0.9 %
10 SYRINGE (ML) INJECTION EVERY 12 HOURS SCHEDULED
Status: DISCONTINUED | OUTPATIENT
Start: 2019-08-11 | End: 2019-08-29 | Stop reason: HOSPADM

## 2019-08-11 RX ORDER — ONDANSETRON 2 MG/ML
4 INJECTION INTRAMUSCULAR; INTRAVENOUS EVERY 6 HOURS PRN
Status: DISCONTINUED | OUTPATIENT
Start: 2019-08-11 | End: 2019-08-29 | Stop reason: HOSPADM

## 2019-08-11 RX ORDER — POTASSIUM CHLORIDE 7.45 MG/ML
10 INJECTION INTRAVENOUS PRN
Status: DISCONTINUED | OUTPATIENT
Start: 2019-08-11 | End: 2019-08-20

## 2019-08-11 RX ORDER — POTASSIUM CHLORIDE 20 MEQ/1
40 TABLET, EXTENDED RELEASE ORAL PRN
Status: DISCONTINUED | OUTPATIENT
Start: 2019-08-11 | End: 2019-08-18

## 2019-08-11 RX ORDER — FENTANYL CITRATE 50 UG/ML
50 INJECTION, SOLUTION INTRAMUSCULAR; INTRAVENOUS ONCE
Status: COMPLETED | OUTPATIENT
Start: 2019-08-11 | End: 2019-08-11

## 2019-08-11 RX ORDER — DEXTROSE MONOHYDRATE 50 MG/ML
100 INJECTION, SOLUTION INTRAVENOUS PRN
Status: DISCONTINUED | OUTPATIENT
Start: 2019-08-11 | End: 2019-08-29 | Stop reason: HOSPADM

## 2019-08-11 RX ORDER — DEXTROSE MONOHYDRATE 25 G/50ML
12.5 INJECTION, SOLUTION INTRAVENOUS PRN
Status: DISCONTINUED | OUTPATIENT
Start: 2019-08-11 | End: 2019-08-29 | Stop reason: HOSPADM

## 2019-08-11 RX ORDER — MAGNESIUM SULFATE 1 G/100ML
1 INJECTION INTRAVENOUS PRN
Status: DISCONTINUED | OUTPATIENT
Start: 2019-08-11 | End: 2019-08-29 | Stop reason: HOSPADM

## 2019-08-11 RX ORDER — ACETAMINOPHEN 325 MG/1
650 TABLET ORAL EVERY 4 HOURS PRN
Status: DISCONTINUED | OUTPATIENT
Start: 2019-08-11 | End: 2019-08-29 | Stop reason: HOSPADM

## 2019-08-11 RX ORDER — SODIUM CHLORIDE 0.9 % (FLUSH) 0.9 %
10 SYRINGE (ML) INJECTION PRN
Status: DISCONTINUED | OUTPATIENT
Start: 2019-08-11 | End: 2019-08-29 | Stop reason: HOSPADM

## 2019-08-11 RX ORDER — ATROPINE SULFATE 0.4 MG/ML
0.4 AMPUL (ML) INJECTION EVERY 5 MIN PRN
Status: DISCONTINUED | OUTPATIENT
Start: 2019-08-11 | End: 2019-08-29 | Stop reason: HOSPADM

## 2019-08-11 RX ORDER — NICOTINE POLACRILEX 4 MG
15 LOZENGE BUCCAL PRN
Status: DISCONTINUED | OUTPATIENT
Start: 2019-08-11 | End: 2019-08-29 | Stop reason: HOSPADM

## 2019-08-11 RX ADMIN — INSULIN LISPRO 2 UNITS: 100 INJECTION, SOLUTION INTRAVENOUS; SUBCUTANEOUS at 12:38

## 2019-08-11 RX ADMIN — CEFEPIME HYDROCHLORIDE 2 G: 2 INJECTION, POWDER, FOR SOLUTION INTRAVENOUS at 16:40

## 2019-08-11 RX ADMIN — METOPROLOL TARTRATE 25 MG: 25 TABLET ORAL at 09:15

## 2019-08-11 RX ADMIN — BETHANECHOL CHLORIDE 25 MG: 25 TABLET ORAL at 09:15

## 2019-08-11 RX ADMIN — CEFEPIME HYDROCHLORIDE 2 G: 2 INJECTION, POWDER, FOR SOLUTION INTRAVENOUS at 03:54

## 2019-08-11 RX ADMIN — POTASSIUM CHLORIDE, DEXTROSE MONOHYDRATE AND SODIUM CHLORIDE: 150; 5; 900 INJECTION, SOLUTION INTRAVENOUS at 06:24

## 2019-08-11 RX ADMIN — BENZTROPINE MESYLATE 0.5 MG: 1 TABLET ORAL at 09:26

## 2019-08-11 RX ADMIN — ATROPINE SULFATE 0.4 MG: 0.4 INJECTION, SOLUTION INTRAMUSCULAR; INTRAVENOUS; SUBCUTANEOUS at 22:08

## 2019-08-11 RX ADMIN — NEOSTIGMINE METHYLSULFATE 2 MG: 1 INJECTION, SOLUTION INTRAMUSCULAR; INTRAVENOUS; SUBCUTANEOUS at 21:43

## 2019-08-11 RX ADMIN — FENTANYL CITRATE 50 MCG: 50 INJECTION, SOLUTION INTRAMUSCULAR; INTRAVENOUS at 23:37

## 2019-08-11 RX ADMIN — ENOXAPARIN SODIUM 40 MG: 40 INJECTION SUBCUTANEOUS at 09:26

## 2019-08-11 RX ADMIN — SODIUM CHLORIDE, PRESERVATIVE FREE 10 ML: 5 INJECTION INTRAVENOUS at 22:53

## 2019-08-11 ASSESSMENT — PAIN SCALES - GENERAL
PAINLEVEL_OUTOF10: 0
PAINLEVEL_OUTOF10: 0
PAINLEVEL_OUTOF10: 8

## 2019-08-11 ASSESSMENT — PAIN SCALES - WONG BAKER: WONGBAKER_NUMERICALRESPONSE: 0

## 2019-08-11 NOTE — PROGRESS NOTES
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 .Patient still remains in her non communicative state with eyes closed although follows some commands especially when asked to take a deep breath during examination. Is bieng treated for pneumonia with normalization of WBC count. Diet: Diet NPO Effective Now  Medications:   Scheduled Meds:   insulin lispro  0-12 Units Subcutaneous Q6H    potassium chloride  40 mEq Oral Once    metoprolol tartrate  25 mg Oral BID    insulin glargine  55 Units Subcutaneous Nightly    insulin lispro  7 Units Subcutaneous TID WC    benztropine  0.5 mg Oral Daily    bethanechol  25 mg Oral 4x Daily    cefepime  2 g Intravenous Q12H    atorvastatin  20 mg Oral Daily    traZODone  100 mg Oral Nightly    sodium chloride flush  10 mL Intravenous 2 times per day    enoxaparin  40 mg Subcutaneous Daily    nicotine  1 patch Transdermal Daily     Continuous Infusions:   dextrose 5% and 0.9% NaCl with KCl 20 mEq 125 mL/hr at 08/11/19 0624    dextrose       CBC:   Recent Labs     08/09/19  0808 08/10/19  0546 08/11/19  0629   WBC 14.7* 11.7* 10.1   HGB 15.4 13.1 14.3    270 255     BMP:    Recent Labs     08/09/19  0808 08/10/19  0546 08/11/19  0629    142 142   K 3.6* 3.7 3.4*    111* 106   CO2 19* 20 25   BUN 13 14 10   CREATININE 0.72 0.77 0.59   GLUCOSE 125* 87 182*     Hepatic: No results for input(s): AST, ALT, ALB, BILITOT, ALKPHOS in the last 72 hours.   Troponin: No results for input(s): TROPONINI in the last 72

## 2019-08-11 NOTE — PROGRESS NOTES
transferred to [address] this morning. I would continue to recommend holding cogentin and risperdal but will defer final decision to primary. Would consider trial of neostigmine vs colonoscopic decompression. Obtaining consent will be difficult as pt usually consents for herself and has no POA. Attempts to contact family have thus far been unsuccessful. Agree with transfer. Will need continued general surgical or GI consultation after transfer.     Electronically signed by Wild Hollins DO  on 8/11/2019 at 9:28 AM

## 2019-08-11 NOTE — H&P
palpable  Lungs: Bilateral equal air entry, clear to ausculation, no wheezing, rales or rhonchi, normal effort  Cardiovascular: normal rate, regular rhythm, no murmur, gallop, rub.   Abdomen: Soft, nontender, nondistended, hyperactive tinkling bowel sounds, no hepatomegaly or splenomegaly NG tube in place with bilious drainage  Neurologic: she is encephalopathic with normal muscle bulk, otherwise cannot be assessed she does not arouse or follow commands or respond to questions  Skin: No gross lesions, rashes, bruising or bleeding on exposed skin area  Extremities:  peripheral pulses palpable, no pedal edema or calf pain with palpation  Psych: Encephalopathic and cannot be assessed    Investigations:      Laboratory Testing:  Recent Results (from the past 24 hour(s))   CBC with DIFF    Collection Time: 08/11/19  6:29 AM   Result Value Ref Range    WBC 10.1 3.5 - 11.0 k/uL    RBC 4.87 4.0 - 5.2 m/uL    Hemoglobin 14.3 12.0 - 16.0 g/dL    Hematocrit 42.8 36 - 46 %    MCV 87.9 80 - 100 fL    MCH 29.5 26 - 34 pg    MCHC 33.5 31 - 37 g/dL    RDW 14.1 11.0 - 14.5 %    Platelets 065 402 - 206 k/uL    MPV 6.9 6.0 - 12.0 fL    NRBC Automated NOT REPORTED per 100 WBC    Differential Type NOT REPORTED     Immature Granulocytes NOT REPORTED 0 %    Absolute Immature Granulocyte NOT REPORTED 0.00 - 0.30 k/uL    WBC Morphology NOT REPORTED     RBC Morphology NOT REPORTED     Platelet Estimate NOT REPORTED     Seg Neutrophils 75 43 - 77 %    Lymphocytes 14 (L) 16 - 46 %    Monocytes 8 4 - 11 %    Eosinophils % 2 1 - 7 %    Basophils 1 0 - 1 %    Segs Absolute 7.60 1.8 - 7.7 k/uL    Absolute Lymph # 1.40 1.0 - 4.8 k/uL    Absolute Mono # 0.80 0.1 - 1.2 k/uL    Absolute Eos # 0.20 0.0 - 0.4 k/uL    Basophils # 0.10 0.0 - 0.2 k/uL   Basic Metabolic Panel w/ Reflex to MG    Collection Time: 08/11/19  6:29 AM   Result Value Ref Range    Glucose 182 (H) 70 - 99 mg/dL    BUN 10 6 - 20 mg/dL    CREATININE 0.59 0.50 - 0.90 mg/dL    Bun/Cre SURGERY     Patient is admitted as inpatient status because of co-morbidities listed above, severity of signs and symptoms as outlined, requirement for current medical therapies and most importantly because of direct risk to patient if care not provided in a hospital setting.     Alexander Mora DO  8/11/2019  6:10 PM    Copy sent to Dr. Lucius Ha, APRN - CNP

## 2019-08-11 NOTE — CONSULTS
General Surgery   Consultation        PATIENT NAME: Cammy Rick   YOB: 1960  MRN: [id number]    ADMISSION DATE: 8/11/2019  2:33 PM     Admitting Physician: Dr. Terry Irizarry Physician: Dr. Ocasio Im: 8/11/2019    Consult Regarding:  mani's colonic pseudo-obstruction     HISTORY OF PRESENT ILLNESS:    The patient is a 62 y.o. female with a hx of psychiatric disorders admitted to [facility] on 8/6/19 after being found down by her case-worker at her home with altered mental status and fevers. General surgery is being consulted due to abdominal distention with CT/Plain films demonstrating colonic dilation up to 13cm in the cecum. Of note the patient was on risperdal and cogentin with a previous dx of schizophrenia and depression, who is unable to provide any history during our consultation as she appears to be in a catatonic like state. CT head was negative for any acute findings however her mental status has obviously not improved since admission at this point as she has remained non-verbal. Additionally the patient was found to have pulmonary infiltrates and was started on cefepime to treat pneumonia. During her admission at [facility] she was noted to develop abdominal distention and the subsequent acute abd series and CT without contrast of her abdomen demonstrated colonic distention with air throughout the colon and rectum. The cecum appears to measure up to 13cm on several of the plain films. Lactic acid 0.7 and the patient has remained hemodynamically stable. Per chart review the patient has not been taking in any PO intake and on our exam noted to have an NG in place with dark brown output in the cannister. She has apparently not been passing flatus or stool for the last 48 hours.  The general surgeon at [facility] evaluated her in consultation prior to her transfer and reported that she was softly distended without any apparent tenderness with bowel sounds and tympanic on exam. The consensus is that there is no mechanical obstruction and that this colonic dilation is secondary to her psychiatric medications, with imaging and exam findings consistent with colonic pseudoobstruction (\"Lorenza's syndrome). The patient has not had any neostigmine or colonic decompression at this point. Due to multiple medical issues and her mental status, in combination with her colonic dilation, the hospitalist at [facility] opted to have her transferred to [facility]  Our general surgery service has been consulted upon arrival.         Past Medical History:        Diagnosis Date    Depression     Diabetes mellitus ([address]     Thyroid disease     Hypothyroid       Past Surgical History:        Procedure Laterality Date    ABDOMEN SURGERY      CHOLECYSTECTOMY      TUBAL LIGATION  2238    UMBILICAL HERNIA REPAIR         Medications Prior to Admission:   Medications Prior to Admission: metFORMIN (GLUCOPHAGE) 1000 MG tablet, Take 1,000 mg by mouth 2 times daily (with meals)  Insulin Glargine-Lixisenatide (SOLIQUA SC), Inject 50 Units into the skin daily  atorvastatin (LIPITOR) 20 MG tablet, Take 20 mg by mouth daily  traZODone (DESYREL) 100 MG tablet, Take 100 mg by mouth nightly  diphenhydrAMINE (BENADRYL) 25 MG capsule, Take 2 capsules by mouth every 6 hours as needed for Itching    Allergies:  Bicillin [penicillin g benzathine] and Geodon [ziprasidone]    Social History:   Social History     Socioeconomic History    Marital status:      Spouse name: Not on file    Number of children: 3    Years of education: Not on file    Highest education level: Not on file   Occupational History    Not on file   Social Needs    Financial resource strain: Not on file    Food insecurity:     Worry: Not on file     Inability: Not on file    Transportation needs:     Medical: Not on file     Non-medical: Not on file   Tobacco Use    Smoking status: Former Smoker     Packs/day: 1.00 evidence of peritoneal findings on exam  MUSCULOSKELETAL: normal muscle tone throughout  EXTREMITIES: peripheral pulses normal, no pedal edema, no calf tenderness  SKIN: normal coloration and turgor      CBC:   Lab Results   Component Value Date    WBC 10.1 08/11/2019    RBC 4.87 08/11/2019    HGB 14.3 08/11/2019    HCT 42.8 08/11/2019    MCV 87.9 08/11/2019    MCH 29.5 08/11/2019    MCHC 33.5 08/11/2019    RDW 14.1 08/11/2019     08/11/2019    MPV 6.9 08/11/2019     BMP:    Lab Results   Component Value Date     08/11/2019    K 3.4 08/11/2019     08/11/2019    CO2 25 08/11/2019    BUN 10 08/11/2019    LABALBU 4.2 08/24/2018    CREATININE 0.59 08/11/2019    CALCIUM 8.9 08/11/2019    GFRAA >60 08/11/2019    LABGLOM >60 08/11/2019    GLUCOSE 182 08/11/2019       Pertinent Radiology:     CT Abdomen Pelvis without Contrast   Result Date: 8/10/2019  *Colonic distension with air and fecal material noted most prominent involving the cecum measuring 10.2 cm in greatest dimension. There is questionable pneumatosis versus trapped gas was stool involving the cecum best seen on series 3, image 81. No focal colonic wall thickening or portal venous gas identified. No obstructing lesion is clearly identified. Developing bowel ischemia cannot be excluded. Surgical consultation and lactate level evaluation is recommended. *Hepatic steatosis. Xr Abdomen (kub) (single Ap View)  Result Date: 8/11/2019  Slight reduction in left colonic diameter. The cecum remains fairly distended. No pneumatosis or free air. Xr Abdomen (kub) (single Ap View)  Result Date: 8/10/2019  Distal aspect of the patient's NG tube appears to be within the stomach. The side port appears to be at the GE junction. This could be advanced approximately 3-4 cm for optimal placement. Xr Acute Abd Series Chest 1 Vw  Result Date: 8/10/2019  Dilation of multiple bowel loops in the abdomen and pelvis.   It is unclear whether this represents severe ileus or bowel obstruction. No evidence of free air to suggest perforation. RECOMMENDATION: Abdominal CT for further evaluation. CT Head Wo Contrast  Result Date: 8/10/2019  No acute intracranial abnormality. CT Head without Contrast  Result Date: 8/6/2019  No acute intracranial abnormality. Senescent changes including chronic microvascular change. Xr Chest Portable  Result Date: 8/9/2019  No change from prior study. Top-normal central vascularity. Unchanged left basilar opacity. ASSESSMENT     1. 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 Does not appear to be secondary to any mechanical cause as there is no obvious distal rectal masses/obstructive sources present. Cecum appears dilated up to 13cm on plain film imaging. 2. Altered mental status - possibly secondary to psychiatric cause. No acute abnormality on CT Head. 2. Pneumonia - improving  3. Type 2 DM       PLAN    1. Keep NPO  2. NG tube already in place at time of consultation. Output dark brown. Since tube is in place may keep for now for administration of medication however unlikely to be decompressing the colonic distention. 3. Imaging studies reviewed after examining patient. High risk for perforation with the amount of distention of her cecum. Will start with attempting to treat with neostigmine. Discussed detailed orders with nursing staff. Will need to be transferred to step down and placed on telemetry prior to administration of neostigmine. Neostigmine to be given slowly over 5 mins with atropine available at the bedside. Orders placed. 4. Will obtain KUB after neostigmine and if fails to decompress the colon, will need a stat GI consult tonight for colonoscopic decompression of the colon to avoid risk of perforation. 5. Recommend continuing to hold cogentin and risperdal which can cause and/or exacerbate symptoms of mani's syndrome.    6. Will continue to follow along closely. Medical management per IM.       ------------------------------------------------------------------  Christie Pagan DO, PGY-4  [address]  8/11/2019 at 7:01 PM       I have reviewed the resident's note and I either performed the key elements of the medical history and physical exam or was present with the resident when the key elements of the medical history and physical exam were performed. I have discussed the findings, established the care plan and recommendations with Resident.     Electronically signed by Candice Garcia IV, DO on 8/12/19 at 5:32 PM

## 2019-08-12 ENCOUNTER — APPOINTMENT (OUTPATIENT)
Dept: GENERAL RADIOLOGY | Age: 59
DRG: 388 | End: 2019-08-12
Attending: INTERNAL MEDICINE
Payer: MEDICARE

## 2019-08-12 LAB
ANION GAP SERPL CALCULATED.3IONS-SCNC: 13 MMOL/L (ref 9–17)
BUN BLDV-MCNC: 14 MG/DL (ref 6–20)
BUN/CREAT BLD: ABNORMAL (ref 9–20)
CALCIUM SERPL-MCNC: 9 MG/DL (ref 8.6–10.4)
CHLORIDE BLD-SCNC: 105 MMOL/L (ref 98–107)
CO2: 27 MMOL/L (ref 20–31)
CREAT SERPL-MCNC: 0.58 MG/DL (ref 0.5–0.9)
CULTURE: NORMAL
GFR AFRICAN AMERICAN: >60 ML/MIN
GFR NON-AFRICAN AMERICAN: >60 ML/MIN
GFR SERPL CREATININE-BSD FRML MDRD: ABNORMAL ML/MIN/{1.73_M2}
GFR SERPL CREATININE-BSD FRML MDRD: ABNORMAL ML/MIN/{1.73_M2}
GLUCOSE BLD-MCNC: 123 MG/DL (ref 65–105)
GLUCOSE BLD-MCNC: 127 MG/DL (ref 65–105)
GLUCOSE BLD-MCNC: 137 MG/DL (ref 70–99)
GLUCOSE BLD-MCNC: 148 MG/DL (ref 65–105)
GLUCOSE BLD-MCNC: 159 MG/DL (ref 65–105)
GLUCOSE BLD-MCNC: 166 MG/DL (ref 65–105)
GLUCOSE BLD-MCNC: 175 MG/DL (ref 65–105)
HCT VFR BLD CALC: 45.3 % (ref 36.3–47.1)
HEMOGLOBIN: 14.9 G/DL (ref 11.9–15.1)
INR BLD: 1
LACTIC ACID, WHOLE BLOOD: 1.4 MMOL/L (ref 0.7–2.1)
Lab: NORMAL
MCH RBC QN AUTO: 29.2 PG (ref 25.2–33.5)
MCHC RBC AUTO-ENTMCNC: 32.9 G/DL (ref 28.4–34.8)
MCV RBC AUTO: 88.6 FL (ref 82.6–102.9)
NRBC AUTOMATED: 0 PER 100 WBC
PDW BLD-RTO: 13 % (ref 11.8–14.4)
PLATELET # BLD: 283 K/UL (ref 138–453)
PMV BLD AUTO: 9.8 FL (ref 8.1–13.5)
POTASSIUM SERPL-SCNC: 3.9 MMOL/L (ref 3.7–5.3)
PROTHROMBIN TIME: 10.8 SEC (ref 9–12)
RBC # BLD: 5.11 M/UL (ref 3.95–5.11)
SODIUM BLD-SCNC: 145 MMOL/L (ref 135–144)
SPECIMEN DESCRIPTION: NORMAL
WBC # BLD: 10.4 K/UL (ref 3.5–11.3)

## 2019-08-12 PROCEDURE — 83605 ASSAY OF LACTIC ACID: CPT

## 2019-08-12 PROCEDURE — 2060000000 HC ICU INTERMEDIATE R&B

## 2019-08-12 PROCEDURE — 80048 BASIC METABOLIC PNL TOTAL CA: CPT

## 2019-08-12 PROCEDURE — 6370000000 HC RX 637 (ALT 250 FOR IP): Performed by: INTERNAL MEDICINE

## 2019-08-12 PROCEDURE — 99223 1ST HOSP IP/OBS HIGH 75: CPT | Performed by: INTERNAL MEDICINE

## 2019-08-12 PROCEDURE — G0121 COLON CA SCRN NOT HI RSK IND: HCPCS | Performed by: INTERNAL MEDICINE

## 2019-08-12 PROCEDURE — 85610 PROTHROMBIN TIME: CPT

## 2019-08-12 PROCEDURE — 6360000002 HC RX W HCPCS: Performed by: INTERNAL MEDICINE

## 2019-08-12 PROCEDURE — 6360000002 HC RX W HCPCS

## 2019-08-12 PROCEDURE — 2500000003 HC RX 250 WO HCPCS: Performed by: STUDENT IN AN ORGANIZED HEALTH CARE EDUCATION/TRAINING PROGRAM

## 2019-08-12 PROCEDURE — 36415 COLL VENOUS BLD VENIPUNCTURE: CPT

## 2019-08-12 PROCEDURE — 95816 EEG AWAKE AND DROWSY: CPT

## 2019-08-12 PROCEDURE — 74018 RADEX ABDOMEN 1 VIEW: CPT

## 2019-08-12 PROCEDURE — 99232 SBSQ HOSP IP/OBS MODERATE 35: CPT | Performed by: INTERNAL MEDICINE

## 2019-08-12 PROCEDURE — 85027 COMPLETE CBC AUTOMATED: CPT

## 2019-08-12 PROCEDURE — 3609155600 HC COLONOSCOPY WITH DECOMPRESSION: Performed by: INTERNAL MEDICINE

## 2019-08-12 PROCEDURE — 2580000003 HC RX 258: Performed by: INTERNAL MEDICINE

## 2019-08-12 PROCEDURE — 82947 ASSAY GLUCOSE BLOOD QUANT: CPT

## 2019-08-12 PROCEDURE — 0DJD8ZZ INSPECTION OF LOWER INTESTINAL TRACT, VIA NATURAL OR ARTIFICIAL OPENING ENDOSCOPIC: ICD-10-PCS | Performed by: INTERNAL MEDICINE

## 2019-08-12 PROCEDURE — 2500000003 HC RX 250 WO HCPCS

## 2019-08-12 PROCEDURE — 95819 EEG AWAKE AND ASLEEP: CPT | Performed by: PSYCHIATRY & NEUROLOGY

## 2019-08-12 PROCEDURE — 6360000002 HC RX W HCPCS: Performed by: STUDENT IN AN ORGANIZED HEALTH CARE EDUCATION/TRAINING PROGRAM

## 2019-08-12 RX ORDER — POTASSIUM CHLORIDE 7.45 MG/ML
40 INJECTION INTRAVENOUS ONCE
Status: COMPLETED | OUTPATIENT
Start: 2019-08-12 | End: 2019-08-12

## 2019-08-12 RX ORDER — FENTANYL CITRATE 50 UG/ML
75 INJECTION, SOLUTION INTRAMUSCULAR; INTRAVENOUS ONCE
Status: COMPLETED | OUTPATIENT
Start: 2019-08-12 | End: 2019-08-12

## 2019-08-12 RX ORDER — METOPROLOL TARTRATE 5 MG/5ML
5 INJECTION INTRAVENOUS EVERY 6 HOURS
Status: DISCONTINUED | OUTPATIENT
Start: 2019-08-12 | End: 2019-08-22

## 2019-08-12 RX ORDER — MIDAZOLAM HYDROCHLORIDE 1 MG/ML
INJECTION INTRAMUSCULAR; INTRAVENOUS
Status: COMPLETED
Start: 2019-08-12 | End: 2019-08-12

## 2019-08-12 RX ORDER — MIDAZOLAM HYDROCHLORIDE 1 MG/ML
3 INJECTION INTRAMUSCULAR; INTRAVENOUS ONCE
Status: COMPLETED | OUTPATIENT
Start: 2019-08-12 | End: 2019-08-12

## 2019-08-12 RX ORDER — METOPROLOL TARTRATE 5 MG/5ML
INJECTION INTRAVENOUS
Status: COMPLETED
Start: 2019-08-12 | End: 2019-08-12

## 2019-08-12 RX ORDER — FENTANYL CITRATE 50 UG/ML
INJECTION, SOLUTION INTRAMUSCULAR; INTRAVENOUS
Status: COMPLETED
Start: 2019-08-12 | End: 2019-08-12

## 2019-08-12 RX ORDER — HYDRALAZINE HYDROCHLORIDE 20 MG/ML
10 INJECTION INTRAMUSCULAR; INTRAVENOUS EVERY 6 HOURS PRN
Status: DISCONTINUED | OUTPATIENT
Start: 2019-08-12 | End: 2019-08-29 | Stop reason: HOSPADM

## 2019-08-12 RX ORDER — DEXTROSE, SODIUM CHLORIDE, AND POTASSIUM CHLORIDE 5; .45; .15 G/100ML; G/100ML; G/100ML
INJECTION INTRAVENOUS CONTINUOUS
Status: DISCONTINUED | OUTPATIENT
Start: 2019-08-12 | End: 2019-08-18

## 2019-08-12 RX ORDER — METOPROLOL TARTRATE 5 MG/5ML
5 INJECTION INTRAVENOUS EVERY 6 HOURS
Status: DISCONTINUED | OUTPATIENT
Start: 2019-08-12 | End: 2019-08-12

## 2019-08-12 RX ORDER — LABETALOL HYDROCHLORIDE 5 MG/ML
10 INJECTION, SOLUTION INTRAVENOUS ONCE
Status: COMPLETED | OUTPATIENT
Start: 2019-08-12 | End: 2019-08-12

## 2019-08-12 RX ORDER — FENTANYL CITRATE 50 UG/ML
75 INJECTION, SOLUTION INTRAMUSCULAR; INTRAVENOUS
Status: DISCONTINUED | OUTPATIENT
Start: 2019-08-12 | End: 2019-08-12

## 2019-08-12 RX ADMIN — MIDAZOLAM HYDROCHLORIDE 3 MG: 1 INJECTION INTRAMUSCULAR; INTRAVENOUS at 07:06

## 2019-08-12 RX ADMIN — Medication 10 MG: at 00:38

## 2019-08-12 RX ADMIN — HYDRALAZINE HYDROCHLORIDE 10 MG: 20 INJECTION INTRAMUSCULAR; INTRAVENOUS at 09:48

## 2019-08-12 RX ADMIN — POTASSIUM CHLORIDE, DEXTROSE MONOHYDRATE AND SODIUM CHLORIDE: 150; 5; 450 INJECTION, SOLUTION INTRAVENOUS at 17:09

## 2019-08-12 RX ADMIN — METOPROLOL TARTRATE 5 MG: 1 INJECTION, SOLUTION INTRAVENOUS at 06:28

## 2019-08-12 RX ADMIN — SODIUM CHLORIDE, PRESERVATIVE FREE 10 ML: 5 INJECTION INTRAVENOUS at 08:20

## 2019-08-12 RX ADMIN — POTASSIUM CHLORIDE 40 MEQ: 7.46 INJECTION, SOLUTION INTRAVENOUS at 02:41

## 2019-08-12 RX ADMIN — METOPROLOL TARTRATE 5 MG: 1 INJECTION, SOLUTION INTRAVENOUS at 17:07

## 2019-08-12 RX ADMIN — FENTANYL CITRATE 75 MCG: 50 INJECTION, SOLUTION INTRAMUSCULAR; INTRAVENOUS at 07:10

## 2019-08-12 RX ADMIN — INSULIN LISPRO 2 UNITS: 100 INJECTION, SOLUTION INTRAVENOUS; SUBCUTANEOUS at 09:52

## 2019-08-12 RX ADMIN — POTASSIUM CHLORIDE, DEXTROSE MONOHYDRATE AND SODIUM CHLORIDE: 150; 5; 450 INJECTION, SOLUTION INTRAVENOUS at 03:20

## 2019-08-12 RX ADMIN — INSULIN LISPRO 2 UNITS: 100 INJECTION, SOLUTION INTRAVENOUS; SUBCUTANEOUS at 15:57

## 2019-08-12 RX ADMIN — INSULIN LISPRO 2 UNITS: 100 INJECTION, SOLUTION INTRAVENOUS; SUBCUTANEOUS at 22:24

## 2019-08-12 RX ADMIN — MIDAZOLAM HYDROCHLORIDE 3 MG: 1 INJECTION, SOLUTION INTRAMUSCULAR; INTRAVENOUS at 07:06

## 2019-08-12 RX ADMIN — ENOXAPARIN SODIUM 40 MG: 40 INJECTION SUBCUTANEOUS at 08:52

## 2019-08-12 RX ADMIN — HYDRALAZINE HYDROCHLORIDE 10 MG: 20 INJECTION INTRAMUSCULAR; INTRAVENOUS at 15:53

## 2019-08-12 RX ADMIN — CEFEPIME HYDROCHLORIDE 2 G: 2 INJECTION, POWDER, FOR SOLUTION INTRAVENOUS at 04:41

## 2019-08-12 RX ADMIN — METOPROLOL TARTRATE 5 MG: 5 INJECTION, SOLUTION INTRAVENOUS at 06:28

## 2019-08-12 RX ADMIN — SODIUM CHLORIDE, PRESERVATIVE FREE 10 ML: 5 INJECTION INTRAVENOUS at 22:24

## 2019-08-12 RX ADMIN — METOPROLOL TARTRATE 5 MG: 1 INJECTION, SOLUTION INTRAVENOUS at 13:00

## 2019-08-12 ASSESSMENT — PAIN SCALES - GENERAL
PAINLEVEL_OUTOF10: 0

## 2019-08-12 NOTE — OP NOTE
[facility]     PROCEDURE DATE: 08/12/19    REFERRING PHYSICIAN: Sylvie Mtz, *     PRIMARY CARE PROVIDER: DESIREE Kim - CNP    ATTENDING PHYSICIAN: Masoud Olivia MD     HISTORY: Ms. Kiera Castro is a 62 y.o. female who presents to the [place] [address] Endoscopy unit for colonoscopy. The patient's clinical history is remarkable for colonic distation. Cecum was read as 14.5 cm. She is currently medically stable and appropriate for the planned procedure. PREOPERATIVE DIAGNOSIS: colonic distention. PROCEDURES:   Transanal Colonoscopy with decompression. POSTPROCEDURE DIAGNOSIS:  Colonic distention    MEDICATIONS:     Versed 3 mg  Fentanyl 75 mcg    EBL: minimal    INSTRUMENT: Olympus PCF-H190 AL flexible Colonoscope. PREPARATION: The nature and character of the procedure as well as risks, benefits, and alternatives were discussed with the patient and informed consent was obtained. Complications were said to include, but were not limited to: medication allergy, medication reaction, cardiovascular and respiratory problems, bleeding, perforation, infection, and/or missed diagnosis. Following arrival in the endoscopy room, the patient was placed in the left lateral decubitus position and final time-out accomplished in the presence of the nursing staff. Baseline vital signs were obtained and reviewed, and IV sedation was subsequently initiated. FINDINGS: Rectal examination demonstrated no significant visible external abnormality and digital palpation was unremarkable. Following adequate conscious sedation the colonoscope was introduced and advanced under direct visualization to the cecum, which was identified by the ileocecal valve. The bowel preparation was felt to be fair to poor. This included moderate amounts of thick stool that was not able to be adequately irrigated and aspirated.       Once maximally inserted, the endoscope was withdrawn and the

## 2019-08-12 NOTE — PROGRESS NOTES
Psych consult was placed per Dr. Nena Anthony upon admission. Received a call from [facility], stating that because pt is unable to sign consent at this time the consult will need recalled tomorrow, or if and when verbal consent obtained from the sister.

## 2019-08-12 NOTE — POST SEDATION
Sedation Post Procedure Note    Patient Name: Candis Javed   YOB: 1960  Room/Bed: 0118/0118-01  Medical Record Number: [id number]  Date: 8/12/2019   Time: 7:11 AM         Physicians/Assistants: Cat Davila MD    Procedure Performed:  Colonoscopy    Post-Sedation Vital Signs:  Vitals:    08/12/19 0530   BP:    Pulse: 93   Resp: 22   Temp:    SpO2: 96%      Vital signs were reviewed and were stable after the procedure (see flow sheet for vitals)            Post-Sedation Exam: Lungs: clear to auscultation bilaterally and Cardiovascular: regular rate and rhythm           Complications: none    Electronically signed by Cat Davila MD on 8/12/2019 at 7:11 AM

## 2019-08-12 NOTE — PROGRESS NOTES
Received orders to administer Neostigmine IVP at bedside, with atropine available if needed. Dr. Juan Jose Price spoken to in regards to orders and procedure, pt to go to step down bed prior to administering.   Will await bed assignment and call report

## 2019-08-12 NOTE — FLOWSHEET NOTE
Pt. Had a bedside colonoscopy with Dr. Fermín Stuart as preforming physician and Silver Saha RN as endoscopy nurse. This nurse was pt. Care nurse and meds were given via verbal order of Dr. Fermín Stuart. Time out was preformed at 0648 and first dose of 2mg of versed and 50mcg of fentanyl were given at 0648. At 1103 another dose of 1mg of versed and 25mcg of fentanyl were administered. Procedure was finished at [id number]. Pt. Tolerated the procedure well and no complications were noted. Vitals are noted in [employer].

## 2019-08-12 NOTE — PLAN OF CARE
[address]    Second Visit Note  For more detailed information please refer to the progress note of the day      8/12/2019    5:12 PM    Name:   Carter Mccracken  MRN:     [id number]     Therese:      [de-identified]   Room:   [facility]/0118-01   Day:  1  Admit Date:  8/11/2019  2:33 PM    PCP:   DESIREE Faria CNP  Code Status:  Full Code        Pt vitals were reviewed   New labs were reviewed   Patient was seen    Updated plan :     1. Patient slightly more responsive, eye-opening and following some simple commands. NG tube remains in place, surgery following for Lorenza's  2.  Transfer to stepdown unit with telemetry        Jaleesa Irvin DO  8/12/2019  5:12 PM

## 2019-08-12 NOTE — CONSULTS
15 g, 15 g, Oral, PRN, Chacha Penn Orlop, DO    dextrose 50 % IV solution, 12.5 g, Intravenous, PRN, Chacha Penn Orlop, DO    glucagon (rDNA) injection 1 mg, 1 mg, Intramuscular, PRN, Chacha Penn Orlop, DO    dextrose 5 % solution, 100 mL/hr, Intravenous, PRN, Chacha Penn Orlop, DO    sodium chloride flush 0.9 % injection 10 mL, 10 mL, Intravenous, 2 times per day, Chacha Penn Orlop, DO, 10 mL at 08/11/19 2253    sodium chloride flush 0.9 % injection 10 mL, 10 mL, Intravenous, PRN, Chacha Penn Orlop, DO    potassium chloride (KLOR-CON M) extended release tablet 40 mEq, 40 mEq, Oral, PRN **OR** potassium bicarb-citric acid (EFFER-K) effervescent tablet 40 mEq, 40 mEq, Oral, PRN **OR** potassium chloride 10 mEq/100 mL IVPB (Peripheral Line), 10 mEq, Intravenous, PRN, Chacha Penn Orlop, DO    magnesium sulfate 1 g in dextrose 5% 100 mL IVPB, 1 g, Intravenous, PRN, Chacha Penn Orlop, DO    magnesium hydroxide (MILK OF MAGNESIA) 400 MG/5ML suspension 30 mL, 30 mL, Oral, Daily PRN, Chacha Penn Orlop, DO    ondansetron (ZOFRAN) injection 4 mg, 4 mg, Intravenous, Q6H PRN, [facility], DO    nicotine (NICODERM CQ) 21 MG/24HR 1 patch, 1 patch, Transdermal, Daily PRN, Chacha Penn Orlop, DO    acetaminophen (TYLENOL) tablet 650 mg, 650 mg, Oral, Q4H PRN, Chacha Penn Orlop, DO    enoxaparin (LOVENOX) injection 40 mg, 40 mg, Subcutaneous, Daily, [facility], DO    insulin lispro (HUMALOG) injection vial 0-12 Units, 0-12 Units, Subcutaneous, Q6H, [facility] Penn Orlop, DO    atropine injection 0.4 mg, 0.4 mg, Intravenous, Q5 Min PRN, Ramandeep Avina, DO, 0.4 mg at 08/11/19 3413    Insulin Glargine-Lixisenatide 100-33 UNT-MCG/ML SOPN 50 Units, 50 Units, Subcutaneous, Daily, Ramandeep Avina DO       ALLERGIES:      Allergies   Allergen Reactions    Bicillin [Penicillin G Benzathine] Shortness Of Breath    Geodon [Ziprasidone]      \"I can't sit still\"          FAMILY HISTORY: The patient's family history was reviewed.         SOCIAL [unfilled]   There is no height or weight on file to calculate BMI. General:  A O x 3 in NAD   Psych: . Normal affect. Mentation normal   HEENT: PERRLA. Clear conjunctivae and sclerae. Moist oral mucosae, no lesions orulcers. The neck is supple, without lymphadenopathy or jugular venous distension. No masses. Normal thyroid. Cardiovascular: S1 S2 RRR no rubs or murmurs. Pulmonary: clear BL. No accessory muscle usage. Abdominal Exam: Soft, NT moderately distended and tympanic, no hepato or spleno megaly, +BS, no ascites. No groin masses or lymphadenopathy. Extremities: No edema. Skin: Warm skin. No skin rash. No spider nevi palmar erythema naildystrophy. Joint: No joint swelling or deformity. Neurological: intact sensory. DTR+.  No asterixis     LABORATORY DATA: Reviewed   Lab Results   Component Value Date    WBC 10.4 08/12/2019    HGB 14.9 08/12/2019    HCT 45.3 08/12/2019    MCV 88.6 08/12/2019     08/12/2019     (H) 08/12/2019    K 3.9 08/12/2019     08/12/2019    CO2 27 08/12/2019    BUN 14 08/12/2019    CREATININE 0.58 08/12/2019    LABALBU 4.2 08/24/2018    BILITOT 0.52 08/24/2018    ALKPHOS 122 (H) 08/24/2018    AST 25 08/24/2018    ALT 37 (H) 08/24/2018    INR 1.0 08/12/2019      Lab Results   Component Value Date    RBC 5.11 08/12/2019    HGB 14.9 08/12/2019    MCV 88.6 08/12/2019    MCH 29.2 08/12/2019    MCHC 32.9 08/12/2019    RDW 13.0 08/12/2019    MPV 9.8 08/12/2019    BASOPCT 1 08/11/2019    LYMPHSABS 1.40 08/11/2019    MONOSABS 0.80 08/11/2019    NEUTROABS 7.60 08/11/2019    EOSABS 0.20 08/11/2019    BASOSABS 0.10 08/11/2019          DIAGNOSTIC TESTING:   Ct Abdomen Pelvis Wo Contrast Additional Contrast? None    Result Date: 8/10/2019  EXAMINATION: CT OF THE ABDOMEN AND PELVIS WITHOUT CONTRAST 8/10/2019 12:15 pm TECHNIQUE: CT of the abdomen and pelvis was performed without the administration of intravenous contrast. Multiplanar reformatted images are previously 7.4. No pneumatosis is evident. Retention sutures are present right upper quadrant. No free air is seen. Slight reduction in left colonic diameter. The cecum remains fairly distended. No pneumatosis or free air. Xr Abdomen (kub) (single Ap View)    Result Date: 8/10/2019  EXAMINATION: ONE SUPINE XRAY VIEW(S) OF THE ABDOMEN 8/10/2019 2:25 pm COMPARISON: None. HISTORY: ORDERING SYSTEM PROVIDED HISTORY: NG placement TECHNOLOGIST PROVIDED HISTORY: NG placement FINDINGS: The distal aspect of the patient's NG tube appears to be within the stomach. The side port appears to be at the GE junction. This could be advanced approximately 3-4 cm for optimal placement. Visualized lung bases appear grossly unremarkable. Once again demonstrated is colonic distension best seen on the prior studies. No definite free air is noted. Patient appears to be status post cholecystectomy. Distal aspect of the patient's NG tube appears to be within the stomach. The side port appears to be at the GE junction. This could be advanced approximately 3-4 cm for optimal placement. RECOMMENDATION: Findings were discussed with Saranya LUZ at 2:44 pm on 8/10/2019. Xr Acute Abd Series Chest 1 Vw    Result Date: 8/10/2019  EXAMINATION: TWO XRAY VIEWS OF THE ABDOMEN AND SINGLE  XRAY VIEW OF THE CHEST 8/10/2019 11:15 am COMPARISON: 08/09/2019 HISTORY: ORDERING SYSTEM PROVIDED HISTORY: SBO? TECHNOLOGIST PROVIDED HISTORY: SBO? Reason for Exam: Abdomen distention. Films were done portable. Patient unable to communicate any medical history. Acuity: Acute Type of Exam: Initial FINDINGS: Mild atelectatic changes in the left lower lobe. Mild cardiomegaly. There is a suggestion of prominent bowel loops throughout the abdomen and pelvis also involving large bowel. No evidence of free air. Status post cholecystectomy. This pattern may represent diffuse ileus or bowel obstruction. CT scan is recommended for further evaluation. August 7, 2019 HISTORY: ORDERING SYSTEM PROVIDED HISTORY: r/o pneumonia TECHNOLOGIST PROVIDED HISTORY: r/o pneumonia Reason for Exam: pneumonia Acuity: Acute Type of Exam: Subsequent/Follow-up FINDINGS: Cardiac monitoring leads overlie the chest.  Borderline cardiomegaly. Small left effusion. Bibasilar atelectasis. Mild central congestion. No pneumothorax. Mild central congestion with left lower lung opacity, stable from prior exam.     Xr Chest Portable    Result Date: 8/7/2019  EXAMINATION: ONE XRAY VIEW OF THE CHEST 8/7/2019 6:18 am COMPARISON: August 6, 2019 HISTORY: ORDERING SYSTEM PROVIDED HISTORY: mental status changes TECHNOLOGIST PROVIDED HISTORY: mental status changes Reason for Exam: Mental status changes Acuity: Acute Type of Exam: Subsequent/Follow-up Follow-up, altered level of consciousness. FINDINGS: The cardiomediastinal silhouette is mildly enlarged but stable. Patchy airspace disease is present involving the left lower lobe or lingula. Right lung is clear. No pleural effusion or pneumothorax. 1. Low lung volumes. Patchy airspace disease involving the left lower lobe or lingula could be related to atelectasis or evolving pneumonia. PA and lateral chest with better inspiratory effort would be helpful for further evaluation 2. Mild cardiomegaly, stable. Xr Chest Portable    Result Date: 8/6/2019  EXAMINATION: ONE XRAY VIEW OF THE CHEST 8/6/2019 2:55 pm COMPARISON: Chest radiograph 01/03/2019 HISTORY: ORDERING SYSTEM PROVIDED HISTORY: Altered mental status TECHNOLOGIST PROVIDED HISTORY: Altered mental status Reason for Exam: Altered Mental Status Initial evaluation. FINDINGS: Unchanged linear opacity in the lateral left lung base consistent with scarring. Otherwise clear lungs. No definite findings of pneumothorax or pleural effusion. Normal mediastinal, hilar, and cardiac contours. No acute fracture. No acute cardiopulmonary process.           IMPRESSION: Ms. Lenin Harry is a 62 y.o. female with colonic distention. Possible colonic pseudoobstruction. Very likely to be chronic given psychiatric issues and use for psychiatric medications follow-up. Plan for colonoscopy with decompression. Thank you for allowing me to participate in the care of Ms. Rick. For any further questions please do not hesitate to contact me.        Jimmy Zhao MD

## 2019-08-12 NOTE — PROGRESS NOTES
murmur  Abdomen:  soft, nontender, mildly distended, high-pitched bowel sounds, no masses, hepatomegaly, splenomegaly  Extremities:  no edema, redness, tenderness in the calves  Skin:  no gross lesions, rashes, induration    Assessment:        Hospital Problems           Last Modified POA    * (Principal) Toxic metabolic encephalopathy 6/53/5348 Yes    Pseudo-obstruction of colon 8/11/2019 Yes    Bacterial pneumonia 8/11/2019 Yes    Controlled type 2 diabetes mellitus without complication, with long-term current use of insulin ([facility] 8/11/2019 Yes    Dyslipidemia 8/11/2019 Yes    Other specified hypothyroidism 8/11/2019 Yes    Schizophrenia ([facility] [address] 8/11/2019 Yes          Plan:        1. Continue NG to low intermittent suction  2. Hold oral medications  3. Insulin scale every 6 hours, hold long-acting insulin while n.p.o.  4. Psychiatry evaluation when appropriate  5. Continue present IV antibiotics  6. GI and general surgery evaluations  7. GI and DVT prophylaxis  8. Follow-up KUB this morning  9.  Consider IV Synthroid, will review records from outside facility to see last dose of oral Synthroid      Isela Santana DO  8/12/2019  7:40 AM

## 2019-08-13 ENCOUNTER — APPOINTMENT (OUTPATIENT)
Dept: GENERAL RADIOLOGY | Age: 59
DRG: 388 | End: 2019-08-13
Attending: INTERNAL MEDICINE
Payer: MEDICARE

## 2019-08-13 LAB
ABSOLUTE EOS #: 0.18 K/UL (ref 0–0.44)
ABSOLUTE IMMATURE GRANULOCYTE: 0.03 K/UL (ref 0–0.3)
ABSOLUTE LYMPH #: 1.79 K/UL (ref 1.1–3.7)
ABSOLUTE MONO #: 0.94 K/UL (ref 0.1–1.2)
ANION GAP SERPL CALCULATED.3IONS-SCNC: 12 MMOL/L (ref 9–17)
ANION GAP SERPL CALCULATED.3IONS-SCNC: 13 MMOL/L (ref 9–17)
BASOPHILS # BLD: 1 % (ref 0–2)
BASOPHILS ABSOLUTE: 0.07 K/UL (ref 0–0.2)
BUN BLDV-MCNC: 11 MG/DL (ref 6–20)
BUN BLDV-MCNC: 11 MG/DL (ref 6–20)
BUN/CREAT BLD: ABNORMAL (ref 9–20)
BUN/CREAT BLD: ABNORMAL (ref 9–20)
CALCIUM SERPL-MCNC: 8.9 MG/DL (ref 8.6–10.4)
CALCIUM SERPL-MCNC: 9.2 MG/DL (ref 8.6–10.4)
CHLORIDE BLD-SCNC: 99 MMOL/L (ref 98–107)
CHLORIDE BLD-SCNC: 99 MMOL/L (ref 98–107)
CO2: 34 MMOL/L (ref 20–31)
CO2: 34 MMOL/L (ref 20–31)
CREAT SERPL-MCNC: 0.52 MG/DL (ref 0.5–0.9)
CREAT SERPL-MCNC: 0.55 MG/DL (ref 0.5–0.9)
DIFFERENTIAL TYPE: ABNORMAL
EOSINOPHILS RELATIVE PERCENT: 2 % (ref 1–4)
GFR AFRICAN AMERICAN: >60 ML/MIN
GFR AFRICAN AMERICAN: >60 ML/MIN
GFR NON-AFRICAN AMERICAN: >60 ML/MIN
GFR NON-AFRICAN AMERICAN: >60 ML/MIN
GFR SERPL CREATININE-BSD FRML MDRD: ABNORMAL ML/MIN/{1.73_M2}
GLUCOSE BLD-MCNC: 105 MG/DL (ref 65–105)
GLUCOSE BLD-MCNC: 145 MG/DL (ref 65–105)
GLUCOSE BLD-MCNC: 161 MG/DL (ref 65–105)
GLUCOSE BLD-MCNC: 169 MG/DL (ref 65–105)
GLUCOSE BLD-MCNC: 170 MG/DL (ref 65–105)
GLUCOSE BLD-MCNC: 176 MG/DL (ref 70–99)
GLUCOSE BLD-MCNC: 189 MG/DL (ref 70–99)
HCT VFR BLD CALC: 44.9 % (ref 36.3–47.1)
HEMOGLOBIN: 14.2 G/DL (ref 11.9–15.1)
IMMATURE GRANULOCYTES: 0 %
LYMPHOCYTES # BLD: 16 % (ref 24–43)
MCH RBC QN AUTO: 28.2 PG (ref 25.2–33.5)
MCHC RBC AUTO-ENTMCNC: 31.6 G/DL (ref 28.4–34.8)
MCV RBC AUTO: 89.1 FL (ref 82.6–102.9)
MONOCYTES # BLD: 8 % (ref 3–12)
NRBC AUTOMATED: 0 PER 100 WBC
PDW BLD-RTO: 13.1 % (ref 11.8–14.4)
PLATELET # BLD: 264 K/UL (ref 138–453)
PLATELET ESTIMATE: ABNORMAL
PMV BLD AUTO: 9.5 FL (ref 8.1–13.5)
POTASSIUM SERPL-SCNC: 3.6 MMOL/L (ref 3.7–5.3)
POTASSIUM SERPL-SCNC: 3.8 MMOL/L (ref 3.7–5.3)
RBC # BLD: 5.04 M/UL (ref 3.95–5.11)
RBC # BLD: ABNORMAL 10*6/UL
SEG NEUTROPHILS: 73 % (ref 36–65)
SEGMENTED NEUTROPHILS ABSOLUTE COUNT: 8.34 K/UL (ref 1.5–8.1)
SODIUM BLD-SCNC: 145 MMOL/L (ref 135–144)
SODIUM BLD-SCNC: 146 MMOL/L (ref 135–144)
WBC # BLD: 11.4 K/UL (ref 3.5–11.3)
WBC # BLD: ABNORMAL 10*3/UL

## 2019-08-13 PROCEDURE — 6370000000 HC RX 637 (ALT 250 FOR IP): Performed by: INTERNAL MEDICINE

## 2019-08-13 PROCEDURE — 36415 COLL VENOUS BLD VENIPUNCTURE: CPT

## 2019-08-13 PROCEDURE — 74018 RADEX ABDOMEN 1 VIEW: CPT

## 2019-08-13 PROCEDURE — 80048 BASIC METABOLIC PNL TOTAL CA: CPT

## 2019-08-13 PROCEDURE — 2500000003 HC RX 250 WO HCPCS: Performed by: STUDENT IN AN ORGANIZED HEALTH CARE EDUCATION/TRAINING PROGRAM

## 2019-08-13 PROCEDURE — 2580000003 HC RX 258: Performed by: INTERNAL MEDICINE

## 2019-08-13 PROCEDURE — 99232 SBSQ HOSP IP/OBS MODERATE 35: CPT | Performed by: INTERNAL MEDICINE

## 2019-08-13 PROCEDURE — 6360000002 HC RX W HCPCS: Performed by: INTERNAL MEDICINE

## 2019-08-13 PROCEDURE — 85025 COMPLETE CBC W/AUTO DIFF WBC: CPT

## 2019-08-13 PROCEDURE — 2060000000 HC ICU INTERMEDIATE R&B

## 2019-08-13 PROCEDURE — 94761 N-INVAS EAR/PLS OXIMETRY MLT: CPT

## 2019-08-13 PROCEDURE — 82947 ASSAY GLUCOSE BLOOD QUANT: CPT

## 2019-08-13 RX ORDER — PROPRANOLOL HYDROCHLORIDE 1 MG/ML
1 INJECTION, SOLUTION INTRAVENOUS ONCE
Status: ON HOLD | COMMUNITY
End: 2019-08-29 | Stop reason: HOSPADM

## 2019-08-13 RX ORDER — LANOLIN ALCOHOL/MO/W.PET/CERES
5 CREAM (GRAM) TOPICAL NIGHTLY PRN
COMMUNITY

## 2019-08-13 RX ORDER — RISPERIDONE 1 MG/1
1 TABLET, FILM COATED ORAL DAILY
COMMUNITY
End: 2019-09-18 | Stop reason: SINTOL

## 2019-08-13 RX ORDER — OLANZAPINE 5 MG/1
5 TABLET ORAL NIGHTLY
COMMUNITY
End: 2019-09-23 | Stop reason: SDUPTHER

## 2019-08-13 RX ORDER — BENZTROPINE MESYLATE 0.5 MG/1
0.5 TABLET ORAL DAILY
Status: ON HOLD | COMMUNITY
End: 2019-08-29 | Stop reason: HOSPADM

## 2019-08-13 RX ADMIN — POTASSIUM CHLORIDE, DEXTROSE MONOHYDRATE AND SODIUM CHLORIDE: 150; 5; 450 INJECTION, SOLUTION INTRAVENOUS at 23:08

## 2019-08-13 RX ADMIN — METOPROLOL TARTRATE 5 MG: 1 INJECTION, SOLUTION INTRAVENOUS at 06:24

## 2019-08-13 RX ADMIN — METOPROLOL TARTRATE 5 MG: 1 INJECTION, SOLUTION INTRAVENOUS at 12:28

## 2019-08-13 RX ADMIN — ENOXAPARIN SODIUM 40 MG: 40 INJECTION SUBCUTANEOUS at 08:32

## 2019-08-13 RX ADMIN — INSULIN LISPRO 2 UNITS: 100 INJECTION, SOLUTION INTRAVENOUS; SUBCUTANEOUS at 17:59

## 2019-08-13 RX ADMIN — INSULIN LISPRO 2 UNITS: 100 INJECTION, SOLUTION INTRAVENOUS; SUBCUTANEOUS at 22:09

## 2019-08-13 RX ADMIN — INSULIN LISPRO 2 UNITS: 100 INJECTION, SOLUTION INTRAVENOUS; SUBCUTANEOUS at 10:37

## 2019-08-13 RX ADMIN — SODIUM CHLORIDE, PRESERVATIVE FREE 10 ML: 5 INJECTION INTRAVENOUS at 21:00

## 2019-08-13 RX ADMIN — Medication 240 ML: at 10:31

## 2019-08-13 RX ADMIN — INSULIN LISPRO 2 UNITS: 100 INJECTION, SOLUTION INTRAVENOUS; SUBCUTANEOUS at 05:02

## 2019-08-13 RX ADMIN — SODIUM CHLORIDE, PRESERVATIVE FREE 10 ML: 5 INJECTION INTRAVENOUS at 08:33

## 2019-08-13 RX ADMIN — METOPROLOL TARTRATE 5 MG: 1 INJECTION, SOLUTION INTRAVENOUS at 00:43

## 2019-08-13 RX ADMIN — METOPROLOL TARTRATE 5 MG: 1 INJECTION, SOLUTION INTRAVENOUS at 18:00

## 2019-08-13 RX ADMIN — POTASSIUM CHLORIDE, DEXTROSE MONOHYDRATE AND SODIUM CHLORIDE: 150; 5; 450 INJECTION, SOLUTION INTRAVENOUS at 12:32

## 2019-08-13 RX ADMIN — METOPROLOL TARTRATE 5 MG: 1 INJECTION, SOLUTION INTRAVENOUS at 23:47

## 2019-08-13 RX ADMIN — POTASSIUM CHLORIDE, DEXTROSE MONOHYDRATE AND SODIUM CHLORIDE: 150; 5; 450 INJECTION, SOLUTION INTRAVENOUS at 04:54

## 2019-08-13 ASSESSMENT — PAIN SCALES - GENERAL
PAINLEVEL_OUTOF10: 0
PAINLEVEL_OUTOF10: 0

## 2019-08-13 ASSESSMENT — PAIN SCALES - WONG BAKER
WONGBAKER_NUMERICALRESPONSE: 0
WONGBAKER_NUMERICALRESPONSE: 0

## 2019-08-13 NOTE — PLAN OF CARE
Patient remained free from falls this shift. Bed locked and in lowest position, call light within reach, and bed alarm on. No new skin breakdown during this shift. Will continue to monitor.

## 2019-08-13 NOTE — PROGRESS NOTES
Physical Therapy  DATE: 2019    NAME: Teresa Wren  MRN: [id number]   : 1960    Patient not seen this date for Physical Therapy due to:  [] Blood transfusion in progress  [] Hemodialysis  []  Patient Declined  [] Spine Precautions   [] Strict Bedrest  [] Surgery/ Procedure  [] Testing      [x] Other-RN reports patient not following commands and screaming with sternal rub, not appropriate for therapy this date. PT will check back 19. [] PT being discontinued at this time. Patient independent. No further needs. [] PT being discontinued at this time as the patient has been transferred to palliative care. No further needs.     Esteban Conception, PT

## 2019-08-13 NOTE — PROGRESS NOTES
Pt received milk & molasses enema with minimal result. She has 900ml out of her NG since 0800.  [address] [place] [address] was notified through secure message. No new orders. Will continue to monitor.

## 2019-08-13 NOTE — PROGRESS NOTES
Pt recevied milk & molasses enema with minimal result. She has 900ml out of her NG since 0800. Dr. Olinda Morris was notified through secure message.

## 2019-08-13 NOTE — PLAN OF CARE
Problem: Falls - Risk of:  Goal: Will remain free from falls  Description  Will remain free from falls  8/13/2019 0156 by Keira Jefferson RN  Outcome: Ongoing  8/12/2019 1356 by Joy Santamaria RN  Outcome: Ongoing  Goal: Absence of physical injury  Description  Absence of physical injury  8/13/2019 0156 by Keira Jefferson RN  Outcome: Ongoing  8/12/2019 1356 by Joy Santamaria RN  Outcome: Ongoing     Problem: Risk for Impaired Skin Integrity  Goal: Tissue integrity - skin and mucous membranes  Description  Structural intactness and normal physiological function of skin and  mucous membranes.   8/13/2019 0156 by Keira Jefferson RN  Outcome: Ongoing  8/12/2019 1356 by Joy Santamaria RN  Outcome: Ongoing

## 2019-08-13 NOTE — PROGRESS NOTES
View)    Result Date: 8/12/2019  Persistent gaseous distention of the colon, with slight increased in degree of distention of the cecum. Has cecal volvulus been considered? Xr Abdomen (kub) (single Ap View)    Result Date: 8/11/2019  Slight reduction in left colonic diameter. The cecum remains fairly distended. No pneumatosis or free air. Xr Abdomen (kub) (single Ap View)    Result Date: 8/10/2019  Distal aspect of the patient's NG tube appears to be within the stomach. The side port appears to be at the GE junction. This could be advanced approximately 3-4 cm for optimal placement. RECOMMENDATION: Findings were discussed with Saranya LUZ at 2:44 pm on 8/10/2019. Xr Acute Abd Series Chest 1 Vw    Result Date: 8/10/2019  Dilation of multiple bowel loops in the abdomen and pelvis. It is unclear whether this represents severe ileus or bowel obstruction. No evidence of free air to suggest perforation. RECOMMENDATION: Abdominal CT for further evaluation. Ct Head Wo Contrast    Result Date: 8/10/2019  No acute intracranial abnormality. Ct Head Wo Contrast    Result Date: 8/6/2019  No acute intracranial abnormality. Senescent changes including chronic microvascular change. Xr Chest Portable    Result Date: 8/9/2019  No change from prior study. Top-normal central vascularity. Unchanged left basilar opacity. Xr Chest Portable    Result Date: 8/8/2019  Mild central congestion with left lower lung opacity, stable from prior exam.     Xr Chest Portable    Result Date: 8/7/2019  1. Low lung volumes. Patchy airspace disease involving the left lower lobe or lingula could be related to atelectasis or evolving pneumonia. PA and lateral chest with better inspiratory effort would be helpful for further evaluation 2. Mild cardiomegaly, stable. Xr Chest Portable    Result Date: 8/6/2019  No acute cardiopulmonary process.        Physical Examination:        General appearance: Spontaneous eye

## 2019-08-14 ENCOUNTER — APPOINTMENT (OUTPATIENT)
Dept: GENERAL RADIOLOGY | Age: 59
DRG: 388 | End: 2019-08-14
Attending: INTERNAL MEDICINE
Payer: MEDICARE

## 2019-08-14 LAB
ABSOLUTE EOS #: 0.19 K/UL (ref 0–0.44)
ABSOLUTE IMMATURE GRANULOCYTE: 0.05 K/UL (ref 0–0.3)
ABSOLUTE LYMPH #: 2.47 K/UL (ref 1.1–3.7)
ABSOLUTE MONO #: 1.07 K/UL (ref 0.1–1.2)
ALBUMIN SERPL-MCNC: 3.2 G/DL (ref 3.5–5.2)
ALBUMIN/GLOBULIN RATIO: 0.9 (ref 1–2.5)
ALP BLD-CCNC: 93 U/L (ref 35–104)
ALT SERPL-CCNC: 36 U/L (ref 5–33)
ANION GAP SERPL CALCULATED.3IONS-SCNC: 11 MMOL/L (ref 9–17)
AST SERPL-CCNC: 23 U/L
BASOPHILS # BLD: 1 % (ref 0–2)
BASOPHILS ABSOLUTE: 0.08 K/UL (ref 0–0.2)
BILIRUB SERPL-MCNC: 0.4 MG/DL (ref 0.3–1.2)
BUN BLDV-MCNC: 11 MG/DL (ref 6–20)
BUN/CREAT BLD: ABNORMAL (ref 9–20)
C-REACTIVE PROTEIN: 7 MG/L (ref 0–5)
CALCIUM SERPL-MCNC: 9.2 MG/DL (ref 8.6–10.4)
CHLORIDE BLD-SCNC: 97 MMOL/L (ref 98–107)
CO2: 35 MMOL/L (ref 20–31)
CREAT SERPL-MCNC: 0.56 MG/DL (ref 0.5–0.9)
DIFFERENTIAL TYPE: ABNORMAL
EOSINOPHILS RELATIVE PERCENT: 2 % (ref 1–4)
GFR AFRICAN AMERICAN: >60 ML/MIN
GFR NON-AFRICAN AMERICAN: >60 ML/MIN
GFR SERPL CREATININE-BSD FRML MDRD: ABNORMAL ML/MIN/{1.73_M2}
GFR SERPL CREATININE-BSD FRML MDRD: ABNORMAL ML/MIN/{1.73_M2}
GLUCOSE BLD-MCNC: 101 MG/DL (ref 65–105)
GLUCOSE BLD-MCNC: 104 MG/DL (ref 65–105)
GLUCOSE BLD-MCNC: 108 MG/DL (ref 65–105)
GLUCOSE BLD-MCNC: 109 MG/DL (ref 65–105)
GLUCOSE BLD-MCNC: 110 MG/DL (ref 65–105)
GLUCOSE BLD-MCNC: 113 MG/DL (ref 65–105)
GLUCOSE BLD-MCNC: 120 MG/DL (ref 65–105)
GLUCOSE BLD-MCNC: 123 MG/DL (ref 65–105)
GLUCOSE BLD-MCNC: 136 MG/DL (ref 65–105)
GLUCOSE BLD-MCNC: 137 MG/DL (ref 65–105)
GLUCOSE BLD-MCNC: 155 MG/DL (ref 65–105)
GLUCOSE BLD-MCNC: 173 MG/DL (ref 65–105)
GLUCOSE BLD-MCNC: 178 MG/DL (ref 70–99)
GLUCOSE BLD-MCNC: 185 MG/DL (ref 65–105)
GLUCOSE BLD-MCNC: 186 MG/DL (ref 65–105)
GLUCOSE BLD-MCNC: 64 MG/DL (ref 65–105)
GLUCOSE BLD-MCNC: 74 MG/DL (ref 65–105)
HCT VFR BLD CALC: 47.1 % (ref 36.3–47.1)
HEMOGLOBIN: 14.4 G/DL (ref 11.9–15.1)
IMMATURE GRANULOCYTES: 0 %
LYMPHOCYTES # BLD: 20 % (ref 24–43)
MAGNESIUM: 2.3 MG/DL (ref 1.6–2.6)
MCH RBC QN AUTO: 28.1 PG (ref 25.2–33.5)
MCHC RBC AUTO-ENTMCNC: 30.6 G/DL (ref 28.4–34.8)
MCV RBC AUTO: 92 FL (ref 82.6–102.9)
MONOCYTES # BLD: 9 % (ref 3–12)
NRBC AUTOMATED: 0 PER 100 WBC
PDW BLD-RTO: 12.8 % (ref 11.8–14.4)
PLATELET # BLD: 284 K/UL (ref 138–453)
PLATELET ESTIMATE: ABNORMAL
PMV BLD AUTO: 9.7 FL (ref 8.1–13.5)
POTASSIUM SERPL-SCNC: 3.4 MMOL/L (ref 3.7–5.3)
RBC # BLD: 5.12 M/UL (ref 3.95–5.11)
RBC # BLD: ABNORMAL 10*6/UL
SEDIMENTATION RATE, ERYTHROCYTE: 10 MM (ref 0–20)
SEG NEUTROPHILS: 68 % (ref 36–65)
SEGMENTED NEUTROPHILS ABSOLUTE COUNT: 8.43 K/UL (ref 1.5–8.1)
SODIUM BLD-SCNC: 143 MMOL/L (ref 135–144)
TOTAL PROTEIN: 6.6 G/DL (ref 6.4–8.3)
WBC # BLD: 12.3 K/UL (ref 3.5–11.3)
WBC # BLD: ABNORMAL 10*3/UL

## 2019-08-14 PROCEDURE — 2580000003 HC RX 258: Performed by: INTERNAL MEDICINE

## 2019-08-14 PROCEDURE — 6370000000 HC RX 637 (ALT 250 FOR IP): Performed by: INTERNAL MEDICINE

## 2019-08-14 PROCEDURE — 83735 ASSAY OF MAGNESIUM: CPT

## 2019-08-14 PROCEDURE — 6360000002 HC RX W HCPCS: Performed by: INTERNAL MEDICINE

## 2019-08-14 PROCEDURE — 2500000003 HC RX 250 WO HCPCS: Performed by: STUDENT IN AN ORGANIZED HEALTH CARE EDUCATION/TRAINING PROGRAM

## 2019-08-14 PROCEDURE — 85651 RBC SED RATE NONAUTOMATED: CPT

## 2019-08-14 PROCEDURE — 86140 C-REACTIVE PROTEIN: CPT

## 2019-08-14 PROCEDURE — 36415 COLL VENOUS BLD VENIPUNCTURE: CPT

## 2019-08-14 PROCEDURE — 94761 N-INVAS EAR/PLS OXIMETRY MLT: CPT

## 2019-08-14 PROCEDURE — 74018 RADEX ABDOMEN 1 VIEW: CPT

## 2019-08-14 PROCEDURE — 80053 COMPREHEN METABOLIC PANEL: CPT

## 2019-08-14 PROCEDURE — 2700000000 HC OXYGEN THERAPY PER DAY

## 2019-08-14 PROCEDURE — 99232 SBSQ HOSP IP/OBS MODERATE 35: CPT | Performed by: INTERNAL MEDICINE

## 2019-08-14 PROCEDURE — 85025 COMPLETE CBC W/AUTO DIFF WBC: CPT

## 2019-08-14 PROCEDURE — 51798 US URINE CAPACITY MEASURE: CPT

## 2019-08-14 PROCEDURE — 82947 ASSAY GLUCOSE BLOOD QUANT: CPT

## 2019-08-14 PROCEDURE — 2060000000 HC ICU INTERMEDIATE R&B

## 2019-08-14 PROCEDURE — 51701 INSERT BLADDER CATHETER: CPT

## 2019-08-14 RX ADMIN — INSULIN LISPRO 2 UNITS: 100 INJECTION, SOLUTION INTRAVENOUS; SUBCUTANEOUS at 17:51

## 2019-08-14 RX ADMIN — POTASSIUM CHLORIDE, DEXTROSE MONOHYDRATE AND SODIUM CHLORIDE: 150; 5; 450 INJECTION, SOLUTION INTRAVENOUS at 20:05

## 2019-08-14 RX ADMIN — ENOXAPARIN SODIUM 40 MG: 40 INJECTION SUBCUTANEOUS at 08:19

## 2019-08-14 RX ADMIN — Medication 10 MEQ: at 10:42

## 2019-08-14 RX ADMIN — Medication 10 MEQ: at 09:29

## 2019-08-14 RX ADMIN — METOPROLOL TARTRATE 5 MG: 1 INJECTION, SOLUTION INTRAVENOUS at 11:05

## 2019-08-14 RX ADMIN — POTASSIUM CHLORIDE, DEXTROSE MONOHYDRATE AND SODIUM CHLORIDE: 150; 5; 450 INJECTION, SOLUTION INTRAVENOUS at 07:06

## 2019-08-14 RX ADMIN — Medication 10 MEQ: at 12:14

## 2019-08-14 RX ADMIN — Medication 240 ML: at 17:49

## 2019-08-14 RX ADMIN — SODIUM CHLORIDE, PRESERVATIVE FREE 10 ML: 5 INJECTION INTRAVENOUS at 09:31

## 2019-08-14 RX ADMIN — METOPROLOL TARTRATE 5 MG: 1 INJECTION, SOLUTION INTRAVENOUS at 23:43

## 2019-08-14 RX ADMIN — SODIUM CHLORIDE, PRESERVATIVE FREE 10 ML: 5 INJECTION INTRAVENOUS at 21:00

## 2019-08-14 RX ADMIN — METOPROLOL TARTRATE 5 MG: 1 INJECTION, SOLUTION INTRAVENOUS at 05:58

## 2019-08-14 RX ADMIN — INSULIN LISPRO 2 UNITS: 100 INJECTION, SOLUTION INTRAVENOUS; SUBCUTANEOUS at 04:30

## 2019-08-14 RX ADMIN — METOPROLOL TARTRATE 5 MG: 1 INJECTION, SOLUTION INTRAVENOUS at 17:51

## 2019-08-14 RX ADMIN — Medication 10 MEQ: at 08:19

## 2019-08-14 ASSESSMENT — PAIN SCALES - GENERAL
PAINLEVEL_OUTOF10: 0
PAINLEVEL_OUTOF10: 0

## 2019-08-14 NOTE — PROGRESS NOTES
[facility]    Progress Note    8/14/2019    7:54 AM    Name:   Yann Villalobos  MRN:     [id number]     Mjide:      [de-identified]   Room:   [address] [place] [address] Day:  3  Admit Date:  8/11/2019  2:33 PM    PCP:   DESIREE Pearson CNP  Code Status:  Full Code    Subjective:     C/C: Altered mental status, Archer syndrome    Interval History Status: not changed. Patient grimaces to noxious stimuli, no spontaneous eye opening and not following commands for me. Brief History: This is a 49-year-old  female who was admitted to [facility] on August 6 with a complaint of alteration of mentation and fever. Christopher Nuñez was found to have pneumonia and was treated with IV Maxipime.  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  She was evaluated by general surgery here and administer neostigmine and has undergone urgent colonoscopy for bowel decompression with the GI service    She is undergone electroencephalogram which was benign. Upon arrival here review of cultures showed no growth at 5 days and she is afebrile. And the IV cefepime was discontinued due to possible neurological side effects. She is remained largely unresponsive and not appropriate for psychiatric evaluation. Review of Systems:     Cannot be obtained due to altered mental status    Medications: Allergies:     Allergies   Allergen Reactions    Bicillin [Penicillin G Benzathine] Shortness Of Breath    Geodon [Ziprasidone]      \"I can't sit still\"       Current Meds:   Scheduled Meds:    metoprolol  5 mg Intravenous Q6H    [Held by provider] traZODone  100 mg Oral Nightly    [Held by provider] 28.2 28.1   MCHC  --  32.9 31.6 30.6   RDW  --  13.0 13.1 12.8   PLT  --  283 264 284   MPV  --  9.8 9.5 9.7   SEDRATE 19  --   --   --    CRP 23.1*  --   --   --    INR  --  1.0  --   --      Chemistry:  Recent Labs     08/12/19  0840 08/13/19  0453 08/13/19  1442 08/14/19  0557   NA  --  145* 146* 143   K  --  3.6* 3.8 3.4*   CL  --  99 99 97*   CO2  --  34* 34* 35*   GLUCOSE  --  176* 189* 178*   BUN  --  11 11 11   CREATININE  --  0.52 0.55 0.56   MG  --   --   --  2.3   ANIONGAP  --  12 13 11   LABGLOM  --  >60 >60 >60   GFRAA  --  >60 >60 >60   CALCIUM  --  8.9 9.2 9.2   LACTACIDWB 1.4  --   --   --      Recent Labs     08/12/19  2220 08/13/19  0456 08/13/19  1029 08/13/19  1747 08/13/19  2007 08/14/19  0557 08/14/19  0601   PROT  --   --   --   --   --  6.6  --    LABALBU  --   --   --   --   --  3.2*  --    AST  --   --   --   --   --  23  --    ALT  --   --   --   --   --  36*  --    ALKPHOS  --   --   --   --   --  93  --    BILITOT  --   --   --   --   --  0.40  --    POCGLU 175* 161* 170* 169* 145*  --  186*     ABG:No results found for: POCPH, PHART, PH, POCPCO2, CAM7UDC, PCO2, POCPO2, PO2ART, PO2, POCHCO3, ICT4LNA, HCO3, NBEA, PBEA, BEART, BE, THGBART, THB, WNL7PYK, TJWX0HMK, H5IOJKEB, O2SAT, FIO2  Lab Results   Component Value Date/Time    SPECIAL NOT REPORTED 08/06/2019 03:30 PM     Lab Results   Component Value Date/Time    CULTURE NO GROWTH 6 DAYS 08/06/2019 03:30 PM       Radiology:  Ct Abdomen Pelvis Wo Contrast Additional Contrast? None    Result Date: 8/10/2019  *Colonic distension with air and fecal material noted most prominent involving the cecum measuring 10.2 cm in greatest dimension. There is questionable pneumatosis versus trapped gas was stool involving the cecum best seen on series 3, image 81. No focal colonic wall thickening or portal venous gas identified. No obstructing lesion is clearly identified. Developing bowel ischemia cannot be excluded.   Surgical consultation and lactate

## 2019-08-14 NOTE — PROGRESS NOTES
Physical Therapy  DATE: 2019    NAME: David Green  MRN: [id number]   : 1960    Patient not seen this date for Physical Therapy due to:  [] Blood transfusion in progress  [] Hemodialysis  []  Patient Declined  [] Spine Precautions   [] Strict Bedrest  [] Surgery/ Procedure  [] Testing      [x] Other   Pt did not respond to voice. Pt would not open his eyes. Pt moans when touched. [] PT being discontinued at this time. Patient independent. No further needs. [] PT being discontinued at this time as the patient has been transferred to palliative care. No further needs.     Bonny Villaseñor, PT

## 2019-08-14 NOTE — CARE COORDINATION
Patient not able to complete interview at this time. Call to sister Larson.  No answer, unable to leave VM
caring for herself. Sister states \"this is the worst she's been in years\" and reports decline since loss of their parents in 2017 and her last divorce in December 2018. Arthur Lynn provided phone number for Kelsey Harrell ([phone]) at [place].           Electronically signed by Lina Marrero RN on 8/14/19 at 12:19 PM

## 2019-08-14 NOTE — PROGRESS NOTES
General Surgery:  Daily Progress Note                PATIENT NAME: Cammy Rick     TODAY'S DATE: 8/14/2019, 12:48 PM    SUBJECTIVE:     Pt seen and examined at bedside. No acute events overnight. Remains w/ minimal response, non-verbal this am at 0600. No bowel movement. Afebrile, VSS. No BM per nursing. Nursing reports 700 ml in bladder scan this am. Straight cath'd once today. 24hr NG output 1150 ml    Currently pt remains nonverbal, follows some commands. Able to nod head, response however unreliable when attempting to evaluation pain. Able to give thumbs up and move toes. OBJECTIVE:   VITALS:  BP (!) 124/59   Pulse 80   Temp 98.4 °F (36.9 °C) (Axillary)   Resp 16   Wt 183 lb 13.8 oz (83.4 kg)   SpO2 97%   BMI 30.60 kg/m²      INTAKE/OUTPUT:      Intake/Output Summary (Last 24 hours) at 8/14/2019 1248  Last data filed at 8/14/2019 1131  Gross per 24 hour   Intake 651.67 ml   Output 1050 ml   Net -398.33 ml       PHYSICAL EXAM:  General Appearance: no acute distress, not following commands  HEENT:  Normocephalic, atraumatic, mucus membranes moist   +NGT  Heart: s1+s2  Lungs: equal and symmetric chest rise/fall,non-labored   Abdomen:soft, mild distention, nt   Extremities: No cyanosis, pitting edema, rashes noted. Skin: Skin color, texture, turgor normal. No rashes or lesions.       Data:  CBC with Differential:    Lab Results   Component Value Date    WBC 12.3 08/14/2019    RBC 5.12 08/14/2019    HGB 14.4 08/14/2019    HCT 47.1 08/14/2019     08/14/2019    MCV 92.0 08/14/2019    MCH 28.1 08/14/2019    MCHC 30.6 08/14/2019    RDW 12.8 08/14/2019    LYMPHOPCT 20 08/14/2019    MONOPCT 9 08/14/2019    BASOPCT 1 08/14/2019    MONOSABS 1.07 08/14/2019    LYMPHSABS 2.47 08/14/2019    EOSABS 0.19 08/14/2019    BASOSABS 0.08 08/14/2019    DIFFTYPE NOT REPORTED 08/14/2019     BMP:    Lab Results   Component Value Date     08/14/2019    K 3.4 08/14/2019    CL 97 08/14/2019    CO2 35

## 2019-08-15 ENCOUNTER — APPOINTMENT (OUTPATIENT)
Dept: GENERAL RADIOLOGY | Age: 59
DRG: 388 | End: 2019-08-15
Attending: INTERNAL MEDICINE
Payer: MEDICARE

## 2019-08-15 LAB
ABSOLUTE EOS #: 0.24 K/UL (ref 0–0.44)
ABSOLUTE IMMATURE GRANULOCYTE: 0.1 K/UL (ref 0–0.3)
ABSOLUTE LYMPH #: 1.78 K/UL (ref 1.1–3.7)
ABSOLUTE MONO #: 0.91 K/UL (ref 0.1–1.2)
ALLEN TEST: ABNORMAL
AMMONIA: 73 UMOL/L (ref 11–51)
ANION GAP SERPL CALCULATED.3IONS-SCNC: 13 MMOL/L (ref 9–17)
BASOPHILS # BLD: 1 % (ref 0–2)
BASOPHILS ABSOLUTE: 0.08 K/UL (ref 0–0.2)
BUN BLDV-MCNC: 14 MG/DL (ref 6–20)
BUN/CREAT BLD: ABNORMAL (ref 9–20)
CALCIUM SERPL-MCNC: 9.2 MG/DL (ref 8.6–10.4)
CARBOXYHEMOGLOBIN: 1 % (ref 0–5)
CHLORIDE BLD-SCNC: 96 MMOL/L (ref 98–107)
CO2: 35 MMOL/L (ref 20–31)
CREAT SERPL-MCNC: 0.68 MG/DL (ref 0.5–0.9)
DIFFERENTIAL TYPE: ABNORMAL
EOSINOPHILS RELATIVE PERCENT: 2 % (ref 1–4)
FIO2: ABNORMAL
FOLATE: 6.7 NG/ML
GFR AFRICAN AMERICAN: >60 ML/MIN
GFR NON-AFRICAN AMERICAN: >60 ML/MIN
GFR SERPL CREATININE-BSD FRML MDRD: ABNORMAL ML/MIN/{1.73_M2}
GFR SERPL CREATININE-BSD FRML MDRD: ABNORMAL ML/MIN/{1.73_M2}
GLUCOSE BLD-MCNC: 146 MG/DL (ref 65–105)
GLUCOSE BLD-MCNC: 177 MG/DL (ref 70–99)
GLUCOSE BLD-MCNC: 178 MG/DL (ref 65–105)
GLUCOSE BLD-MCNC: 182 MG/DL (ref 65–105)
GLUCOSE BLD-MCNC: 185 MG/DL (ref 65–105)
HCO3 VENOUS: 37 MMOL/L (ref 24–30)
HCT VFR BLD CALC: 47 % (ref 36.3–47.1)
HEMOGLOBIN: 14.9 G/DL (ref 11.9–15.1)
IMMATURE GRANULOCYTES: 1 %
LYMPHOCYTES # BLD: 12 % (ref 24–43)
MAGNESIUM: 2 MG/DL (ref 1.6–2.6)
MCH RBC QN AUTO: 29 PG (ref 25.2–33.5)
MCHC RBC AUTO-ENTMCNC: 31.7 G/DL (ref 28.4–34.8)
MCV RBC AUTO: 91.4 FL (ref 82.6–102.9)
METHEMOGLOBIN: ABNORMAL % (ref 0–1.5)
MODE: ABNORMAL
MONOCYTES # BLD: 6 % (ref 3–12)
NEGATIVE BASE EXCESS, VEN: ABNORMAL MMOL/L (ref 0–2)
NOTIFICATION TIME: ABNORMAL
NOTIFICATION: ABNORMAL
NRBC AUTOMATED: 0 PER 100 WBC
O2 DEVICE/FLOW/%: ABNORMAL
O2 SAT, VEN: 99.3 % (ref 60–85)
OXYHEMOGLOBIN: ABNORMAL % (ref 95–98)
PATIENT TEMP: 37
PCO2, VEN, TEMP ADJ: ABNORMAL MMHG (ref 39–55)
PCO2, VEN: 43.2 (ref 39–55)
PDW BLD-RTO: 12.7 % (ref 11.8–14.4)
PEEP/CPAP: ABNORMAL
PH VENOUS: 7.54 (ref 7.32–7.42)
PH, VEN, TEMP ADJ: ABNORMAL (ref 7.32–7.42)
PLATELET # BLD: 267 K/UL (ref 138–453)
PLATELET ESTIMATE: ABNORMAL
PMV BLD AUTO: 9.8 FL (ref 8.1–13.5)
PO2, VEN, TEMP ADJ: ABNORMAL MMHG (ref 30–50)
PO2, VEN: 139 (ref 30–50)
POSITIVE BASE EXCESS, VEN: 12.7 MMOL/L (ref 0–2)
POTASSIUM SERPL-SCNC: 3.4 MMOL/L (ref 3.7–5.3)
PSV: ABNORMAL
PT. POSITION: ABNORMAL
RBC # BLD: 5.14 M/UL (ref 3.95–5.11)
RBC # BLD: ABNORMAL 10*6/UL
RESPIRATORY RATE: ABNORMAL
SAMPLE SITE: ABNORMAL
SEG NEUTROPHILS: 78 % (ref 36–65)
SEGMENTED NEUTROPHILS ABSOLUTE COUNT: 12.25 K/UL (ref 1.5–8.1)
SET RATE: ABNORMAL
SODIUM BLD-SCNC: 144 MMOL/L (ref 135–144)
TEXT FOR RESPIRATORY: ABNORMAL
TOTAL HB: ABNORMAL G/DL (ref 12–16)
TOTAL RATE: ABNORMAL
TSH SERPL DL<=0.05 MIU/L-ACNC: 3.83 MIU/L (ref 0.3–5)
VITAMIN B-12: 946 PG/ML (ref 232–1245)
VT: ABNORMAL
WBC # BLD: 15.4 K/UL (ref 3.5–11.3)
WBC # BLD: ABNORMAL 10*3/UL

## 2019-08-15 PROCEDURE — 6360000004 HC RX CONTRAST MEDICATION: Performed by: STUDENT IN AN ORGANIZED HEALTH CARE EDUCATION/TRAINING PROGRAM

## 2019-08-15 PROCEDURE — 51701 INSERT BLADDER CATHETER: CPT

## 2019-08-15 PROCEDURE — 6360000002 HC RX W HCPCS: Performed by: INTERNAL MEDICINE

## 2019-08-15 PROCEDURE — 2060000000 HC ICU INTERMEDIATE R&B

## 2019-08-15 PROCEDURE — 74018 RADEX ABDOMEN 1 VIEW: CPT

## 2019-08-15 PROCEDURE — 99232 SBSQ HOSP IP/OBS MODERATE 35: CPT | Performed by: INTERNAL MEDICINE

## 2019-08-15 PROCEDURE — 82607 VITAMIN B-12: CPT

## 2019-08-15 PROCEDURE — 2700000000 HC OXYGEN THERAPY PER DAY

## 2019-08-15 PROCEDURE — 80048 BASIC METABOLIC PNL TOTAL CA: CPT

## 2019-08-15 PROCEDURE — 2500000003 HC RX 250 WO HCPCS: Performed by: STUDENT IN AN ORGANIZED HEALTH CARE EDUCATION/TRAINING PROGRAM

## 2019-08-15 PROCEDURE — 74250 X-RAY XM SM INT 1CNTRST STD: CPT

## 2019-08-15 PROCEDURE — 84443 ASSAY THYROID STIM HORMONE: CPT

## 2019-08-15 PROCEDURE — 6370000000 HC RX 637 (ALT 250 FOR IP): Performed by: INTERNAL MEDICINE

## 2019-08-15 PROCEDURE — 83735 ASSAY OF MAGNESIUM: CPT

## 2019-08-15 PROCEDURE — 82140 ASSAY OF AMMONIA: CPT

## 2019-08-15 PROCEDURE — 2580000003 HC RX 258: Performed by: INTERNAL MEDICINE

## 2019-08-15 PROCEDURE — 94761 N-INVAS EAR/PLS OXIMETRY MLT: CPT

## 2019-08-15 PROCEDURE — 51798 US URINE CAPACITY MEASURE: CPT

## 2019-08-15 PROCEDURE — 99222 1ST HOSP IP/OBS MODERATE 55: CPT | Performed by: PSYCHIATRY & NEUROLOGY

## 2019-08-15 PROCEDURE — 85025 COMPLETE CBC W/AUTO DIFF WBC: CPT

## 2019-08-15 PROCEDURE — 82805 BLOOD GASES W/O2 SATURATION: CPT

## 2019-08-15 PROCEDURE — 82746 ASSAY OF FOLIC ACID SERUM: CPT

## 2019-08-15 PROCEDURE — 82947 ASSAY GLUCOSE BLOOD QUANT: CPT

## 2019-08-15 PROCEDURE — 36415 COLL VENOUS BLD VENIPUNCTURE: CPT

## 2019-08-15 RX ORDER — POTASSIUM CHLORIDE 7.45 MG/ML
10 INJECTION INTRAVENOUS
Status: DISPENSED | OUTPATIENT
Start: 2019-08-15 | End: 2019-08-15

## 2019-08-15 RX ADMIN — SODIUM CHLORIDE, PRESERVATIVE FREE 10 ML: 5 INJECTION INTRAVENOUS at 07:56

## 2019-08-15 RX ADMIN — METOPROLOL TARTRATE 5 MG: 1 INJECTION, SOLUTION INTRAVENOUS at 12:56

## 2019-08-15 RX ADMIN — INSULIN LISPRO 2 UNITS: 100 INJECTION, SOLUTION INTRAVENOUS; SUBCUTANEOUS at 07:58

## 2019-08-15 RX ADMIN — POTASSIUM CHLORIDE, DEXTROSE MONOHYDRATE AND SODIUM CHLORIDE: 150; 5; 450 INJECTION, SOLUTION INTRAVENOUS at 21:05

## 2019-08-15 RX ADMIN — POTASSIUM CHLORIDE 10 MEQ: 7.46 INJECTION, SOLUTION INTRAVENOUS at 12:56

## 2019-08-15 RX ADMIN — POTASSIUM CHLORIDE, DEXTROSE MONOHYDRATE AND SODIUM CHLORIDE: 150; 5; 450 INJECTION, SOLUTION INTRAVENOUS at 03:50

## 2019-08-15 RX ADMIN — METOPROLOL TARTRATE 5 MG: 1 INJECTION, SOLUTION INTRAVENOUS at 18:23

## 2019-08-15 RX ADMIN — ENOXAPARIN SODIUM 40 MG: 40 INJECTION SUBCUTANEOUS at 07:57

## 2019-08-15 RX ADMIN — INSULIN LISPRO 2 UNITS: 100 INJECTION, SOLUTION INTRAVENOUS; SUBCUTANEOUS at 18:23

## 2019-08-15 RX ADMIN — POTASSIUM CHLORIDE 10 MEQ: 7.46 INJECTION, SOLUTION INTRAVENOUS at 11:10

## 2019-08-15 RX ADMIN — Medication 10 MEQ: at 15:17

## 2019-08-15 RX ADMIN — POTASSIUM CHLORIDE 10 MEQ: 7.46 INJECTION, SOLUTION INTRAVENOUS at 14:06

## 2019-08-15 RX ADMIN — LACTULOSE: 10 SOLUTION ORAL at 18:23

## 2019-08-15 RX ADMIN — IOHEXOL 300 ML: 240 INJECTION, SOLUTION INTRATHECAL; INTRAVASCULAR; INTRAVENOUS; ORAL at 20:53

## 2019-08-15 RX ADMIN — INSULIN LISPRO 2 UNITS: 100 INJECTION, SOLUTION INTRAVENOUS; SUBCUTANEOUS at 00:18

## 2019-08-15 RX ADMIN — INSULIN LISPRO 2 UNITS: 100 INJECTION, SOLUTION INTRAVENOUS; SUBCUTANEOUS at 12:57

## 2019-08-15 RX ADMIN — METOPROLOL TARTRATE 5 MG: 1 INJECTION, SOLUTION INTRAVENOUS at 06:55

## 2019-08-15 ASSESSMENT — PAIN SCALES - GENERAL
PAINLEVEL_OUTOF10: 0
PAINLEVEL_OUTOF10: 0

## 2019-08-15 ASSESSMENT — PAIN SCALES - WONG BAKER: WONGBAKER_NUMERICALRESPONSE: 0

## 2019-08-15 NOTE — PROGRESS NOTES
provider] traZODone  100 mg Oral Nightly    [Held by provider] atorvastatin  20 mg Oral Daily    sodium chloride flush  10 mL Intravenous 2 times per day    enoxaparin  40 mg Subcutaneous Daily    insulin lispro  0-12 Units Subcutaneous Q6H    [Held by provider] Insulin Glargine-Lixisenatide  50 Units Subcutaneous Daily     Continuous Infusions:    dextrose 5% and 0.45% NaCl with KCl 20 mEq 125 mL/hr at 08/15/19 0350    dextrose       PRN Meds: hydrALAZINE, diphenhydrAMINE, glucose, dextrose, glucagon (rDNA), dextrose, sodium chloride flush, potassium chloride **OR** potassium alternative oral replacement **OR** potassium chloride, magnesium sulfate, magnesium hydroxide, ondansetron, nicotine, acetaminophen, atropine    Data:     Past Medical History:   has a past medical history of Depression, Diabetes mellitus ([address] and Thyroid disease. Social History:   reports that she quit smoking about 5 months ago. Her smoking use included cigarettes. She has a 40.00 pack-year smoking history. She has never used smokeless tobacco. She reports that she does not drink alcohol or use drugs. Family History:   Family History   Problem Relation Age of Onset    Diabetes Mother     Heart Disease Mother     Stroke Brother        Vitals:  BP (!) 143/74   Pulse 84   Temp 99 °F (37.2 °C) (Axillary)   Resp 15   Wt 185 lb 6.5 oz (84.1 kg)   SpO2 96%   BMI 30.85 kg/m²   Temp (24hrs), Av.1 °F (36.7 °C), Min:97.4 °F (36.3 °C), Max:99 °F (37.2 °C)    Recent Labs     19  1610 19  1751 19  2354 08/15/19  1254   POCGLU 185* 173* 146* 182*       I/O (24Hr):     Intake/Output Summary (Last 24 hours) at 8/15/2019 1423  Last data filed at 8/15/2019 1200  Gross per 24 hour   Intake 3295.42 ml   Output 1850 ml   Net 1445.42 ml       Labs:  Hematology:  Recent Labs     19  0453 19  0557 08/15/19  0626   WBC 11.4* 12.3* 15.4*   RBC 5.04 5.12* 5.14*   HGB 14.2 14.4 14.9   HCT 44.9 47.1 47.0   MCV 89.1 92.0 91.4   MCH 28.2 28.1 29.0   MCHC 31.6 30.6 31.7   RDW 13.1 12.8 12.7    284 267   MPV 9.5 9.7 9.8   SEDRATE  --  10  --    CRP  --  7.0*  --      Chemistry:  Recent Labs     08/13/19  1442 08/14/19  0557 08/15/19  0626   * 143 144   K 3.8 3.4* 3.4*   CL 99 97* 96*   CO2 34* 35* 35*   GLUCOSE 189* 178* 177*   BUN 11 11 14   CREATININE 0.55 0.56 0.68   MG  --  2.3 2.0   ANIONGAP 13 11 13   LABGLOM >60 >60 >60   GFRAA >60 >60 >60   CALCIUM 9.2 9.2 9.2     Recent Labs     08/14/19  0557 08/14/19  0601 08/14/19  1102 08/14/19  1610 08/14/19  1751 08/14/19  2354 08/15/19  0626 08/15/19  1211 08/15/19  1254   PROT 6.6  --   --   --   --   --   --   --   --    LABALBU 3.2*  --   --   --   --   --   --   --   --    TSH  --   --   --   --   --   --  3.83  --   --    AST 23  --   --   --   --   --   --   --   --    ALT 36*  --   --   --   --   --   --   --   --    ALKPHOS 93  --   --   --   --   --   --   --   --    BILITOT 0.40  --   --   --   --   --   --   --   --    AMMONIA  --   --   --   --   --   --   --  73*  --    POCGLU  --  186* 123* 185* 173* 146*  --   --  182*     ABG:  Lab Results   Component Value Date    FIO2 INFORMATION NOT PROVIDED 08/15/2019     Lab Results   Component Value Date/Time    SPECIAL NOT REPORTED 08/06/2019 03:30 PM     Lab Results   Component Value Date/Time    CULTURE NO GROWTH 6 DAYS 08/06/2019 03:30 PM       Radiology:  Ct Abdomen Pelvis Wo Contrast Additional Contrast? None    Result Date: 8/10/2019  *Colonic distension with air and fecal material noted most prominent involving the cecum measuring 10.2 cm in greatest dimension. There is questionable pneumatosis versus trapped gas was stool involving the cecum best seen on series 3, image 81. No focal colonic wall thickening or portal venous gas identified. No obstructing lesion is clearly identified. Developing bowel ischemia cannot be excluded. Surgical consultation and lactate level evaluation is recommended. *Hepatic steatosis. The findings were sent to the Radiology Results Communication Center at 12:46 pm on 8/10/2019to be communicated to a licensed caregiver. Xr Abdomen (kub) (single Ap View)    Result Date: 8/13/2019  1. Enteric tube appears unchanged in position. 2. Nonspecific bowel gas pattern. 3. Scattered stool in the colon. Xr Abdomen (kub) (single Ap View)    Result Date: 8/12/2019  Interval placement of enteric tube in the distal stomach and colonic decompression. No dilated loops of bowel identified. Xr Abdomen (kub) (single Ap View)    Result Date: 8/12/2019  Persistent gaseous distention of the colon, with slight increased in degree of distention of the cecum. Has cecal volvulus been considered? Xr Abdomen (kub) (single Ap View)    Result Date: 8/11/2019  Slight reduction in left colonic diameter. The cecum remains fairly distended. No pneumatosis or free air. Xr Abdomen (kub) (single Ap View)    Result Date: 8/10/2019  Distal aspect of the patient's NG tube appears to be within the stomach. The side port appears to be at the GE junction. This could be advanced approximately 3-4 cm for optimal placement. RECOMMENDATION: Findings were discussed with Saranya LUZ at 2:44 pm on 8/10/2019. Xr Acute Abd Series Chest 1 Vw    Result Date: 8/10/2019  Dilation of multiple bowel loops in the abdomen and pelvis. It is unclear whether this represents severe ileus or bowel obstruction. No evidence of free air to suggest perforation. RECOMMENDATION: Abdominal CT for further evaluation. Ct Head Wo Contrast    Result Date: 8/10/2019  No acute intracranial abnormality. Xr Chest Portable    Result Date: 8/9/2019  No change from prior study. Top-normal central vascularity. Unchanged left basilar opacity.      Xr Chest Portable    Result Date: 8/8/2019  Mild central congestion with left lower lung opacity, stable from prior exam.       Physical Examination:        General appearance:

## 2019-08-15 NOTE — PROGRESS NOTES
Occupational Therapy    Occupational Therapy Not Seen Note    DATE: 8/15/2019  Name: Martha Benitez  : 1960  MRN: [id number]    Patient not available for Occupational Therapy due to:    Pt not appropriate for OT session this date per RN as pt lethargic and responding only to pain.        Next Scheduled Treatment: Will check back 2019    Electronically signed by Jaylon Velasquez OT on 8/15/2019 at 3:13 PM

## 2019-08-15 NOTE — PROGRESS NOTES
Pt refused for vitals and blood sugar check to be done even after re-approaching. Pt says \" Just leave me alone\", as she cries out \" I am tired of this mess\". Nurse spoke with patient to comfort her. No urine output and continues to refuse straight cath. MD previous notified will f/u accordingly.

## 2019-08-15 NOTE — PROGRESS NOTES
abnormality. Currently patient is afebrile and hemodynamically stable (previously hypertensive in the 170's SBP). Review of labs showed no electrolyte abnormalities except for some mild hypokalemia. CRP 23.1->7.0. ESR 19->10. Hepatic panel negative. TSH negative. UDS + for benzodiazepines. Leukocytosis initially 12->18 at [facility] which trended down to normal. Now WBC count is increasing again to 15.4. No anemia. No thrombocytopenia. UA negative on 8/6/19. Blood cultures x 3 on 8/6/19 negative thus far. No current facility-administered medications on file prior to encounter. Current Outpatient Medications on File Prior to Encounter   Medication Sig Dispense Refill    propranolol (INDERAL) 1 MG/ML injection Infuse 1 mg intravenously once      benztropine (COGENTIN) 0.5 MG tablet Take 0.5 mg by mouth daily      melatonin 3 MG TABS tablet Take 5 mg by mouth nightly as needed      risperiDONE (RISPERDAL) 1 MG tablet Take 1 mg by mouth daily      OLANZapine (ZYPREXA) 5 MG tablet Take 5 mg by mouth nightly      metFORMIN (GLUCOPHAGE) 1000 MG tablet Take 1,000 mg by mouth 2 times daily (with meals)      Insulin Glargine-Lixisenatide (SOLIQUA SC) Inject 50 Units into the skin daily      atorvastatin (LIPITOR) 20 MG tablet Take 20 mg by mouth daily      traZODone (DESYREL) 100 MG tablet Take 100 mg by mouth nightly      diphenhydrAMINE (BENADRYL) 25 MG capsule Take 2 capsules by mouth every 6 hours as needed for Itching 30 capsule 0       Allergies: Cammy Rick is allergic to bicillin [penicillin g benzathine] and geodon [ziprasidone].     Past Medical History:   Diagnosis Date    Depression     Diabetes mellitus ([address]     Thyroid disease     Hypothyroid       Past Surgical History:   Procedure Laterality Date    ABDOMEN SURGERY      CHOLECYSTECTOMY      COLONOSCOPY  8/12/2019    COLONOSCOPY FLEXIBLE W/ DECOMPRESSION performed by Koko Arriola MD at [address] [place] [address] [id number]    UMBILICAL HERNIA REPAIR             Medications:     metoprolol  5 mg Intravenous Q6H    [Held by provider] traZODone  100 mg Oral Nightly    [Held by provider] atorvastatin  20 mg Oral Daily    sodium chloride flush  10 mL Intravenous 2 times per day    enoxaparin  40 mg Subcutaneous Daily    insulin lispro  0-12 Units Subcutaneous Q6H    [Held by provider] Insulin Glargine-Lixisenatide  50 Units Subcutaneous Daily     PRN Meds include: hydrALAZINE, diphenhydrAMINE, glucose, dextrose, glucagon (rDNA), dextrose, sodium chloride flush, potassium chloride **OR** potassium alternative oral replacement **OR** potassium chloride, magnesium sulfate, magnesium hydroxide, ondansetron, nicotine, acetaminophen, atropine    Objective:   BP (!) 143/81   Pulse 81   Temp 98 °F (36.7 °C) (Temporal)   Resp 13   Wt 185 lb 6.5 oz (84.1 kg)   SpO2 97%   BMI 30.85 kg/m²     Blood pressure range: Systolic (54VVN), XEQ:205 , Min:111 , FEB:688   ; Diastolic (90MIU), FRITZ:01, Min:59, Max:97        NEUROLOGIC EXAMINATION  GENERAL  Appears tearful. Patient lethargic but arousal to sternal rub. Does answer limited questions but not cooperative to exam. Tells examiner to [facility] her alone\" & and that \"she wants to die\". HEENT  NC/ AT   cardiovascular  S1 and S2 heard; palpation of pulses: radial pulse    NECK  Supple and no bruits heard   MENTAL STATUS:  Patient lethargic but arousal to sternal rub. Does answer limited questions but not cooperative to exam. Tells examiner to [facility] her alone\" & and that \"she wants to die\". Answering limited questions; unable to obtain how orientated.     CRANIAL NERVES: II     -      PERRLA, optic discs; clear; posterior segments  Visual fields intact to confrontation  III,IV,VI -  EOMs full, no afferent defect, no RONDA, no ptosis  VII    -     Normal facial symmetry    Unable to access remaining remaining cranial nerves as she is not cooperative with exam.      MOTOR FUNCTION: Unable to

## 2019-08-15 NOTE — PROGRESS NOTES
1000ml lactulose enema given. Patient tolerated well but did not have a bowel movement. Patient was also bladder scanned for only 202ml not straight cathed at this time.

## 2019-08-15 NOTE — CONSULTS
tablet Take 1 mg by mouth daily      OLANZapine (ZYPREXA) 5 MG tablet Take 5 mg by mouth nightly      metFORMIN (GLUCOPHAGE) 1000 MG tablet Take 1,000 mg by mouth 2 times daily (with meals)      Insulin Glargine-Lixisenatide (SOLIQUA SC) Inject 50 Units into the skin daily      atorvastatin (LIPITOR) 20 MG tablet Take 20 mg by mouth daily      traZODone (DESYREL) 100 MG tablet Take 100 mg by mouth nightly      diphenhydrAMINE (BENADRYL) 25 MG capsule Take 2 capsules by mouth every 6 hours as needed for Itching 30 capsule 0     Allergies: Cammy Rick is allergic to bicillin [penicillin g benzathine] and geodon [ziprasidone]. Past Medical History:   Diagnosis Date    Depression     Diabetes mellitus ([facility] [address]     Thyroid disease     Hypothyroid       Past Surgical History:   Procedure Laterality Date    ABDOMEN SURGERY      CHOLECYSTECTOMY      COLONOSCOPY  8/12/2019    COLONOSCOPY FLEXIBLE W/ DECOMPRESSION performed by Zain Bang MD at [address] [place] [address]       Social History: Ze[facility]  reports that she quit smoking about 5 months ago. Her smoking use included cigarettes. She has a 40.00 pack-year smoking history. She has never used smokeless tobacco. She reports that she does not drink alcohol or use drugs.     Family History   Problem Relation Age of Onset    Diabetes Mother     Heart Disease Mother     Stroke Brother        Current Medications:     metoprolol  5 mg Intravenous Q6H    [Held by provider] traZODone  100 mg Oral Nightly    [Held by provider] atorvastatin  20 mg Oral Daily    sodium chloride flush  10 mL Intravenous 2 times per day    enoxaparin  40 mg Subcutaneous Daily    insulin lispro  0-12 Units Subcutaneous Q6H    [Held by provider] Insulin Glargine-Lixisenatide  50 Units Subcutaneous Daily     PRN Meds include: hydrALAZINE, diphenhydrAMINE, glucose, dextrose, glucagon (rDNA), dextrose, sodium chloride flush, potassium chloride **OR** potassium alternative oral replacement **OR** potassium chloride, magnesium sulfate, magnesium hydroxide, ondansetron, nicotine, acetaminophen, atropine    ROS:   Unable to assess fully as patient is noncooperative and does not follow commands stop    Objective:   BP (!) 147/97   Pulse 75   Temp 97.4 °F (36.3 °C) (Temporal)   Resp 16   Wt 183 lb 13.8 oz (83.4 kg)   SpO2 96%   BMI 30.60 kg/m²     Blood pressure range: Systolic (73PVM), CIF:215 , Min:124 , GYM:328   ; Diastolic (43NHR), YDV:69, Min:59, Max:97      Continuous infusions:    dextrose 5% and 0.45% NaCl with KCl 20 mEq 125 mL/hr at 08/14/19 2005    dextrose         Physical Exam      NEUROLOGIC EXAMINATION  MENTAL STATUS:  Alert, oriented to self only ,not to time or place   CRANIAL NERVES: II     -      Visual fields intact to confrontation (blink to threat)  II,III-PEERLA  III,IV,VI-extraocular movement intact  V-normal facial sensation  VII no facial asymmetry   MOTOR FUNCTION: Observation: No fasciculations, no atrophy, No tremors/involuntary movements in all 4 extremities proximally and distally    Moves all extremities spontaneously, patient does not follow commands could not check power strength stop   SENSORY FUNCTION:  Withdraws to pain in all extremities   CEREBELLAR FUNCTION:  Cannot be assessed as patient does not cooperate to follow commands   REFLEX FUNCTION:  Right Triceps (C7): 2/2                                             Left Tricep (C7): 2/2   Right Bicep (C5, C6): 2/2                                         Left Bicep (C5, C6): 2/2   Right Brachiocephalic (C6): 2/2                               Left Brachiocephalic (C6): 2/2     Right Patella (L4): 2/2                                              Left Patella (L4): 2/2   Right Achilles (S1): 2/2                                            Left Achilles (S1): 2/2      Gonzalez Sign: negative   Plantar (Babinski) Response: downgoing       STATION and GAIT not tested                     Data:    Lab Results:     Lab Results   Component Value Date    ALT 36 (H) 08/14/2019    AST 23 08/14/2019    TSH 4.08 08/06/2019    INR 1.0 08/12/2019     Hematology:  Recent Labs     08/12/19  0450 08/13/19  0453 08/14/19  0557   WBC 10.4 11.4* 12.3*   HGB 14.9 14.2 14.4   HCT 45.3 44.9 47.1    264 284   SEDRATE  --   --  10   INR 1.0  --   --      Chemistry:  Recent Labs     08/13/19  0453 08/13/19  1442 08/14/19  0557   * 146* 143   K 3.6* 3.8 3.4*   CL 99 99 97*   CO2 34* 34* 35*   GLUCOSE 176* 189* 178*   BUN 11 11 11   CREATININE 0.52 0.55 0.56   MG  --   --  2.3   CALCIUM 8.9 9.2 9.2     Recent Labs     08/14/19  0557   PROT 6.6   LABALBU 3.2*   AST 23   ALT 36*       No results found for: PHENYTOIN, PHENYTOIN, VALPROATE, CBMZ                Assessment Recommendations:  ASSESSMENT RECOMMENDATIONS:  Encephalopathy-can be metabolic versus infectious versus structural.  Patient underwent treatment for pneumonia and bowel decompression can monitor for clinical improvement over 24 hours. CT head done was unremarkable for any acute intracranial abnormality. EEG done showed no epileptiform discharges or seizure-like activity  Will get vitamin P35 folic acid TSH ESR CRP   Will obtain MRI with and without contrast  to evaluate for any structural abnormality. Will obtain LP to rule out any brain infection. Would recommend psychiatry evaluation. Monitor hemodynamic stability- SBP, fever, leucocytosis and maintain adequate hydration          Will follow with you. Thank you for consultation.          Cinthia Reynaga MD Pager: [phone]  Electronically signed 8/14/2019 at 11:16 PM

## 2019-08-15 NOTE — PROGRESS NOTES
increase in colonic distention on KUB. Abdominal exam remains soft, minimal distention, slight ttp but hard to examine as patient reacts same to touch of should as to her abdomen. Continued no bowel function. Will f/u read of KUB but appears slight increase in colonic distention. Will consider SBFT as there is moderate stool burden on KUB. If no change may repeat CT abd/pelv w/in next 24hr. Rec patient up to chair, needs PT/OT. Rip Montiel PGY4    I have reviewed the resident's note and I either performed the key elements of the medical history and physical exam or was present with the resident when the key elements of the medical history and physical exam were performed. I have discussed the findings, established the care plan and recommendations with Resident.     Electronically signed by Kun Garcia IV, DO on 8/19/19 at 3:42 PM

## 2019-08-16 ENCOUNTER — APPOINTMENT (OUTPATIENT)
Dept: GENERAL RADIOLOGY | Age: 59
DRG: 388 | End: 2019-08-16
Attending: INTERNAL MEDICINE
Payer: MEDICARE

## 2019-08-16 ENCOUNTER — ANESTHESIA (OUTPATIENT)
Dept: ENDOSCOPY | Age: 59
DRG: 388 | End: 2019-08-16
Payer: MEDICARE

## 2019-08-16 ENCOUNTER — APPOINTMENT (OUTPATIENT)
Dept: CT IMAGING | Age: 59
DRG: 388 | End: 2019-08-16
Attending: INTERNAL MEDICINE
Payer: MEDICARE

## 2019-08-16 ENCOUNTER — ANESTHESIA EVENT (OUTPATIENT)
Dept: ENDOSCOPY | Age: 59
DRG: 388 | End: 2019-08-16
Payer: MEDICARE

## 2019-08-16 VITALS
SYSTOLIC BLOOD PRESSURE: 102 MMHG | DIASTOLIC BLOOD PRESSURE: 50 MMHG | OXYGEN SATURATION: 94 % | RESPIRATION RATE: 15 BRPM

## 2019-08-16 PROBLEM — E44.0 MODERATE MALNUTRITION (HCC): Status: ACTIVE | Noted: 2019-08-16

## 2019-08-16 LAB
-: ABNORMAL
ABSOLUTE EOS #: 0.18 K/UL (ref 0–0.44)
ABSOLUTE IMMATURE GRANULOCYTE: 0.07 K/UL (ref 0–0.3)
ABSOLUTE LYMPH #: 2.15 K/UL (ref 1.1–3.7)
ABSOLUTE MONO #: 0.98 K/UL (ref 0.1–1.2)
AMMONIA: 34 UMOL/L (ref 11–51)
AMORPHOUS: ABNORMAL
ANION GAP SERPL CALCULATED.3IONS-SCNC: 13 MMOL/L (ref 9–17)
BACTERIA: ABNORMAL
BASOPHILS # BLD: 1 % (ref 0–2)
BASOPHILS ABSOLUTE: 0.09 K/UL (ref 0–0.2)
BILIRUBIN URINE: NEGATIVE
BUN BLDV-MCNC: 16 MG/DL (ref 6–20)
BUN/CREAT BLD: ABNORMAL (ref 9–20)
CALCIUM SERPL-MCNC: 8.7 MG/DL (ref 8.6–10.4)
CASTS UA: ABNORMAL /LPF (ref 0–8)
CHLORIDE BLD-SCNC: 101 MMOL/L (ref 98–107)
CO2: 31 MMOL/L (ref 20–31)
COLOR: YELLOW
COMMENT UA: ABNORMAL
CREAT SERPL-MCNC: 0.68 MG/DL (ref 0.5–0.9)
CRYSTALS, UA: ABNORMAL /HPF
DIFFERENTIAL TYPE: ABNORMAL
EOSINOPHILS RELATIVE PERCENT: 1 % (ref 1–4)
EPITHELIAL CELLS UA: ABNORMAL /HPF (ref 0–5)
GFR AFRICAN AMERICAN: >60 ML/MIN
GFR NON-AFRICAN AMERICAN: >60 ML/MIN
GFR SERPL CREATININE-BSD FRML MDRD: ABNORMAL ML/MIN/{1.73_M2}
GFR SERPL CREATININE-BSD FRML MDRD: ABNORMAL ML/MIN/{1.73_M2}
GLUCOSE BLD-MCNC: 126 MG/DL (ref 65–105)
GLUCOSE BLD-MCNC: 157 MG/DL (ref 65–105)
GLUCOSE BLD-MCNC: 161 MG/DL (ref 65–105)
GLUCOSE BLD-MCNC: 166 MG/DL (ref 65–105)
GLUCOSE BLD-MCNC: 166 MG/DL (ref 65–105)
GLUCOSE BLD-MCNC: 182 MG/DL (ref 70–99)
GLUCOSE URINE: NEGATIVE
HCT VFR BLD CALC: 46.6 % (ref 36.3–47.1)
HEMOGLOBIN: 14.5 G/DL (ref 11.9–15.1)
IMMATURE GRANULOCYTES: 1 %
KETONES, URINE: NEGATIVE
LACTIC ACID, WHOLE BLOOD: 1.2 MMOL/L (ref 0.7–2.1)
LEUKOCYTE ESTERASE, URINE: NEGATIVE
LYMPHOCYTES # BLD: 14 % (ref 24–43)
MAGNESIUM: 2 MG/DL (ref 1.6–2.6)
MCH RBC QN AUTO: 28.7 PG (ref 25.2–33.5)
MCHC RBC AUTO-ENTMCNC: 31.1 G/DL (ref 28.4–34.8)
MCV RBC AUTO: 92.1 FL (ref 82.6–102.9)
MONOCYTES # BLD: 6 % (ref 3–12)
MUCUS: ABNORMAL
NITRITE, URINE: NEGATIVE
NRBC AUTOMATED: 0 PER 100 WBC
OTHER OBSERVATIONS UA: ABNORMAL
PDW BLD-RTO: 12.3 % (ref 11.8–14.4)
PH UA: >9 (ref 5–8)
PLATELET # BLD: 286 K/UL (ref 138–453)
PLATELET ESTIMATE: ABNORMAL
PMV BLD AUTO: 9.9 FL (ref 8.1–13.5)
POTASSIUM SERPL-SCNC: 3.2 MMOL/L (ref 3.7–5.3)
PROCALCITONIN: 0.13 NG/ML
PROTEIN UA: ABNORMAL
RBC # BLD: 5.06 M/UL (ref 3.95–5.11)
RBC # BLD: ABNORMAL 10*6/UL
RBC UA: ABNORMAL /HPF (ref 0–4)
RENAL EPITHELIAL, UA: ABNORMAL /HPF
SEG NEUTROPHILS: 77 % (ref 36–65)
SEGMENTED NEUTROPHILS ABSOLUTE COUNT: 11.75 K/UL (ref 1.5–8.1)
SODIUM BLD-SCNC: 145 MMOL/L (ref 135–144)
SPECIFIC GRAVITY UA: 1.02 (ref 1–1.03)
TRICHOMONAS: ABNORMAL
TURBIDITY: ABNORMAL
URINE HGB: NEGATIVE
UROBILINOGEN, URINE: NORMAL
WBC # BLD: 15.2 K/UL (ref 3.5–11.3)
WBC # BLD: ABNORMAL 10*3/UL
WBC UA: ABNORMAL /HPF (ref 0–5)
YEAST: ABNORMAL

## 2019-08-16 PROCEDURE — C1729 CATH, DRAINAGE: HCPCS | Performed by: INTERNAL MEDICINE

## 2019-08-16 PROCEDURE — 6360000002 HC RX W HCPCS: Performed by: INTERNAL MEDICINE

## 2019-08-16 PROCEDURE — 84145 PROCALCITONIN (PCT): CPT

## 2019-08-16 PROCEDURE — 82947 ASSAY GLUCOSE BLOOD QUANT: CPT

## 2019-08-16 PROCEDURE — 36415 COLL VENOUS BLD VENIPUNCTURE: CPT

## 2019-08-16 PROCEDURE — 6370000000 HC RX 637 (ALT 250 FOR IP): Performed by: INTERNAL MEDICINE

## 2019-08-16 PROCEDURE — 80048 BASIC METABOLIC PNL TOTAL CA: CPT

## 2019-08-16 PROCEDURE — 51798 US URINE CAPACITY MEASURE: CPT

## 2019-08-16 PROCEDURE — 74177 CT ABD & PELVIS W/CONTRAST: CPT

## 2019-08-16 PROCEDURE — 3700000001 HC ADD 15 MINUTES (ANESTHESIA): Performed by: INTERNAL MEDICINE

## 2019-08-16 PROCEDURE — 2580000003 HC RX 258: Performed by: NURSE ANESTHETIST, CERTIFIED REGISTERED

## 2019-08-16 PROCEDURE — 6360000002 HC RX W HCPCS: Performed by: NURSE ANESTHETIST, CERTIFIED REGISTERED

## 2019-08-16 PROCEDURE — 99999 PR OFFICE/OUTPT VISIT,PROCEDURE ONLY: CPT | Performed by: INTERNAL MEDICINE

## 2019-08-16 PROCEDURE — 6360000002 HC RX W HCPCS: Performed by: PSYCHIATRY & NEUROLOGY

## 2019-08-16 PROCEDURE — 2060000000 HC ICU INTERMEDIATE R&B

## 2019-08-16 PROCEDURE — 83735 ASSAY OF MAGNESIUM: CPT

## 2019-08-16 PROCEDURE — 87040 BLOOD CULTURE FOR BACTERIA: CPT

## 2019-08-16 PROCEDURE — 2500000003 HC RX 250 WO HCPCS: Performed by: INTERNAL MEDICINE

## 2019-08-16 PROCEDURE — 7100000010 HC PHASE II RECOVERY - FIRST 15 MIN: Performed by: INTERNAL MEDICINE

## 2019-08-16 PROCEDURE — 2500000003 HC RX 250 WO HCPCS: Performed by: NURSE ANESTHETIST, CERTIFIED REGISTERED

## 2019-08-16 PROCEDURE — 6360000004 HC RX CONTRAST MEDICATION: Performed by: INTERNAL MEDICINE

## 2019-08-16 PROCEDURE — 99232 SBSQ HOSP IP/OBS MODERATE 35: CPT | Performed by: INTERNAL MEDICINE

## 2019-08-16 PROCEDURE — 97161 PT EVAL LOW COMPLEX 20 MIN: CPT

## 2019-08-16 PROCEDURE — 82140 ASSAY OF AMMONIA: CPT

## 2019-08-16 PROCEDURE — 81015 MICROSCOPIC EXAM OF URINE: CPT

## 2019-08-16 PROCEDURE — 6360000002 HC RX W HCPCS: Performed by: NURSE PRACTITIONER

## 2019-08-16 PROCEDURE — 74018 RADEX ABDOMEN 1 VIEW: CPT

## 2019-08-16 PROCEDURE — 3609155600 HC COLONOSCOPY WITH DECOMPRESSION: Performed by: INTERNAL MEDICINE

## 2019-08-16 PROCEDURE — 2500000003 HC RX 250 WO HCPCS: Performed by: STUDENT IN AN ORGANIZED HEALTH CARE EDUCATION/TRAINING PROGRAM

## 2019-08-16 PROCEDURE — 83605 ASSAY OF LACTIC ACID: CPT

## 2019-08-16 PROCEDURE — 0DJD8ZZ INSPECTION OF LOWER INTESTINAL TRACT, VIA NATURAL OR ARTIFICIAL OPENING ENDOSCOPIC: ICD-10-PCS | Performed by: INTERNAL MEDICINE

## 2019-08-16 PROCEDURE — 85025 COMPLETE CBC W/AUTO DIFF WBC: CPT

## 2019-08-16 PROCEDURE — 51701 INSERT BLADDER CATHETER: CPT

## 2019-08-16 PROCEDURE — 3700000000 HC ANESTHESIA ATTENDED CARE: Performed by: INTERNAL MEDICINE

## 2019-08-16 PROCEDURE — 7100000011 HC PHASE II RECOVERY - ADDTL 15 MIN: Performed by: INTERNAL MEDICINE

## 2019-08-16 RX ORDER — OLANZAPINE 10 MG/1
5 INJECTION, POWDER, LYOPHILIZED, FOR SOLUTION INTRAMUSCULAR EVERY EVENING
Status: DISCONTINUED | OUTPATIENT
Start: 2019-08-16 | End: 2019-08-29 | Stop reason: HOSPADM

## 2019-08-16 RX ORDER — PROMETHAZINE HYDROCHLORIDE 25 MG/ML
12.5 INJECTION, SOLUTION INTRAMUSCULAR; INTRAVENOUS EVERY 6 HOURS PRN
Status: DISCONTINUED | OUTPATIENT
Start: 2019-08-16 | End: 2019-08-29 | Stop reason: HOSPADM

## 2019-08-16 RX ORDER — PROPOFOL 10 MG/ML
INJECTION, EMULSION INTRAVENOUS PRN
Status: DISCONTINUED | OUTPATIENT
Start: 2019-08-16 | End: 2019-08-16 | Stop reason: SDUPTHER

## 2019-08-16 RX ORDER — LEVOFLOXACIN 5 MG/ML
500 INJECTION, SOLUTION INTRAVENOUS EVERY 24 HOURS
Status: DISCONTINUED | OUTPATIENT
Start: 2019-08-16 | End: 2019-08-22

## 2019-08-16 RX ORDER — LIDOCAINE HYDROCHLORIDE 10 MG/ML
INJECTION, SOLUTION INFILTRATION; PERINEURAL PRN
Status: DISCONTINUED | OUTPATIENT
Start: 2019-08-16 | End: 2019-08-16 | Stop reason: SDUPTHER

## 2019-08-16 RX ORDER — POTASSIUM CHLORIDE 7.45 MG/ML
10 INJECTION INTRAVENOUS
Status: DISPENSED | OUTPATIENT
Start: 2019-08-16 | End: 2019-08-16

## 2019-08-16 RX ORDER — LORAZEPAM 2 MG/ML
1 INJECTION INTRAMUSCULAR ONCE
Status: COMPLETED | OUTPATIENT
Start: 2019-08-16 | End: 2019-08-16

## 2019-08-16 RX ORDER — SODIUM CHLORIDE 9 MG/ML
INJECTION, SOLUTION INTRAVENOUS CONTINUOUS PRN
Status: DISCONTINUED | OUTPATIENT
Start: 2019-08-16 | End: 2019-08-16 | Stop reason: SDUPTHER

## 2019-08-16 RX ADMIN — METOPROLOL TARTRATE 5 MG: 1 INJECTION, SOLUTION INTRAVENOUS at 01:00

## 2019-08-16 RX ADMIN — LEVOFLOXACIN 500 MG: 5 INJECTION, SOLUTION INTRAVENOUS at 14:06

## 2019-08-16 RX ADMIN — INSULIN LISPRO 2 UNITS: 100 INJECTION, SOLUTION INTRAVENOUS; SUBCUTANEOUS at 23:56

## 2019-08-16 RX ADMIN — INSULIN LISPRO 2 UNITS: 100 INJECTION, SOLUTION INTRAVENOUS; SUBCUTANEOUS at 13:39

## 2019-08-16 RX ADMIN — SODIUM CHLORIDE: 9 INJECTION, SOLUTION INTRAVENOUS at 14:58

## 2019-08-16 RX ADMIN — PROPOFOL 20 MG: 10 INJECTION, EMULSION INTRAVENOUS at 15:04

## 2019-08-16 RX ADMIN — POTASSIUM CHLORIDE, DEXTROSE MONOHYDRATE AND SODIUM CHLORIDE: 150; 5; 450 INJECTION, SOLUTION INTRAVENOUS at 13:44

## 2019-08-16 RX ADMIN — POTASSIUM CHLORIDE, DEXTROSE MONOHYDRATE AND SODIUM CHLORIDE: 150; 5; 450 INJECTION, SOLUTION INTRAVENOUS at 04:47

## 2019-08-16 RX ADMIN — ONDANSETRON 4 MG: 2 INJECTION INTRAMUSCULAR; INTRAVENOUS at 04:33

## 2019-08-16 RX ADMIN — POTASSIUM CHLORIDE 10 MEQ: 7.46 INJECTION, SOLUTION INTRAVENOUS at 13:45

## 2019-08-16 RX ADMIN — PROPOFOL 30 MG: 10 INJECTION, EMULSION INTRAVENOUS at 15:00

## 2019-08-16 RX ADMIN — IOVERSOL 75 ML: 741 INJECTION INTRA-ARTERIAL; INTRAVENOUS at 12:18

## 2019-08-16 RX ADMIN — LORAZEPAM 1 MG: 2 INJECTION INTRAMUSCULAR; INTRAVENOUS at 10:09

## 2019-08-16 RX ADMIN — Medication 10 MEQ: at 16:59

## 2019-08-16 RX ADMIN — METOPROLOL TARTRATE 5 MG: 1 INJECTION, SOLUTION INTRAVENOUS at 18:36

## 2019-08-16 RX ADMIN — POTASSIUM CHLORIDE 10 MEQ: 7.46 INJECTION, SOLUTION INTRAVENOUS at 10:08

## 2019-08-16 RX ADMIN — ONDANSETRON 4 MG: 2 INJECTION INTRAMUSCULAR; INTRAVENOUS at 11:42

## 2019-08-16 RX ADMIN — PROPOFOL 30 MG: 10 INJECTION, EMULSION INTRAVENOUS at 15:19

## 2019-08-16 RX ADMIN — PHENYLEPHRINE HYDROCHLORIDE 100 MCG: 10 INJECTION INTRAVENOUS at 15:08

## 2019-08-16 RX ADMIN — INSULIN LISPRO 2 UNITS: 100 INJECTION, SOLUTION INTRAVENOUS; SUBCUTANEOUS at 00:59

## 2019-08-16 RX ADMIN — LIDOCAINE HYDROCHLORIDE 50 MG: 10 INJECTION, SOLUTION INFILTRATION; PERINEURAL at 15:00

## 2019-08-16 RX ADMIN — PROPOFOL 30 MG: 10 INJECTION, EMULSION INTRAVENOUS at 15:13

## 2019-08-16 RX ADMIN — Medication 10 MEQ: at 15:37

## 2019-08-16 RX ADMIN — METOPROLOL TARTRATE 5 MG: 1 INJECTION, SOLUTION INTRAVENOUS at 05:58

## 2019-08-16 RX ADMIN — POTASSIUM CHLORIDE 10 MEQ: 7.46 INJECTION, SOLUTION INTRAVENOUS at 11:36

## 2019-08-16 RX ADMIN — PROPOFOL 30 MG: 10 INJECTION, EMULSION INTRAVENOUS at 15:08

## 2019-08-16 RX ADMIN — HYDROMORPHONE HYDROCHLORIDE 0.5 MG: 1 INJECTION, SOLUTION INTRAMUSCULAR; INTRAVENOUS; SUBCUTANEOUS at 14:35

## 2019-08-16 RX ADMIN — INSULIN LISPRO 2 UNITS: 100 INJECTION, SOLUTION INTRAVENOUS; SUBCUTANEOUS at 05:58

## 2019-08-16 RX ADMIN — METOPROLOL TARTRATE 5 MG: 1 INJECTION, SOLUTION INTRAVENOUS at 23:56

## 2019-08-16 RX ADMIN — METOPROLOL TARTRATE 5 MG: 1 INJECTION, SOLUTION INTRAVENOUS at 13:45

## 2019-08-16 ASSESSMENT — PAIN SCALES - WONG BAKER: WONGBAKER_NUMERICALRESPONSE: 6

## 2019-08-16 ASSESSMENT — PAIN SCALES - GENERAL
PAINLEVEL_OUTOF10: 0
PAINLEVEL_OUTOF10: 0
PAINLEVEL_OUTOF10: 10

## 2019-08-16 ASSESSMENT — PAIN DESCRIPTION - LOCATION: LOCATION: BACK;ABDOMEN

## 2019-08-16 ASSESSMENT — PAIN - FUNCTIONAL ASSESSMENT: PAIN_FUNCTIONAL_ASSESSMENT: 0-10

## 2019-08-16 NOTE — PROGRESS NOTES
Department of Psychiatry  Attending Physician Psychiatric Assessment     CHIEF COMPLAINT: mental status changes, acute confusion, history of schizophrenia    History obtained from:  patient, electronic medical record    HISTORY OF PRESENT ILLNESS:    Sveta Fernandez is a 62 y.o. female who presented to the ED with acute mental status changes after she was found down unconscious. The history was taken from the chart, the nurse taking care of the patient as well as her sister Megan Arrieta who was reached at the phone number [phone]. The patient herself was unable to give consent for the tele psych interview due to her mental status. 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 She was treated by antibiotics however she has not awakened fully since the procedure. According to her sister the patient is able to have a near normal conversation at baseline, she lives alone in an apartment and has normal ADLs including paying her bills and taking care of her apartment but she has a  who helps her with access to care. The patient has schizophrenia with history of multiple psychiatric admissions. She has been more stressed out in the past year or so due to the death of her parents within 1 month of each other and getting  within short period of time of that. Last time her sister saw her, the patient was responding to internal stimuli, laughing and crying with no clear reason and hearing auditory hallucinations. Sister is certain that the patient has not been taking her psychiatric medications as prescribed recently. At this time the patient keeps her eyes closed, she responds to painful stimuli with moaning, yelling and motor withdrawal.  She also says short words such as no and occasionally short sentences  But she does not respond to verbal commands according to the nurse.     PAST PSYCHIATRIC HISTORY: sedation or a combination of these factors. In that case she should improve as the medical condition improves. · Alternatively she may be having catatonia secondary to uncontrolled schizophrenia due to noncompliance with her medications. In that case, a trial of IV Ativan 1 mg should be attempted in the morning under close monitoring (Verbal order given). If mental status improves shortly after Ativan, that would confirm the diagnosis of catatonia and a low dose Ativan should be given 3 times a day. · I would recommend holding off on antipsychotics for now until she is more alert. · Tele psychiatry will follow the progress. · I agree with neurology recommendation to rule out structural and CNS infectious causes with an MRI and LP. · Thank you very much for the consultation.            Physicians Signature:  Electronically signed by Gurwinder Pepe MD, on 8/15/2019 at 9:46 PM

## 2019-08-16 NOTE — PLAN OF CARE
GI Update:    Discussed CT scan results with Dr. Tanner Macdonald, patient is much more distended, and trying to lay in fetal position. Will plan for emergency colonoscopy to decompress colon.     Lady Gonzalez, CNP

## 2019-08-16 NOTE — PROGRESS NOTES
psych meds, serial abdominal exams, support  2. Will recheck Ammonia level stat  3. Will follow up on CT scan     Thank you for allowing me to participate in the care of your patient. Please feel free to contact me with any questions or concerns. [address] [facility]  [phone]      Attending Physician Statement  I have discussed the care of [facility] and   I have examined the patient myselft independently, and taken ros and hpi , including pertinent history and exam findings,  with the author of this note . I have reviewed the key elements of all parts of the encounter with the nurse practitioner/resident.     I agree with the assessment, plan and orders as documented by the above health care provider       Electronically signed by Gomez Pradhan MD

## 2019-08-16 NOTE — ANESTHESIA POSTPROCEDURE EVALUATION
Department of Anesthesiology  Postprocedure Note    Patient: Jerod Grove  MRN: [id number]  YOB: 1960  Date of evaluation: 8/16/2019  Time:  3:52 PM     Procedure Summary     Date:  08/16/19 Room / Location:  [address] [facility]    Anesthesia Start:  5211 Anesthesia Stop:  3890    Procedure:  COLONOSCOPY FLEXIBLE W/ DECOMPRESSION (N/A ) Diagnosis:  (colonic distention)    Surgeon:  Ciro Issa MD Responsible Provider:  Nieves Chaney MD    Anesthesia Type:  MAC ASA Status:  2 - Emergent          Anesthesia Type: MAC    Hien Phase I: Hien Score: 6    Hien Phase II: Hien Score: 8    Last vitals: Reviewed and per EMR flowsheets.        Anesthesia Post Evaluation    Patient location during evaluation: PACU  Patient participation: complete - patient participated  Level of consciousness: awake and alert  Pain score: 0  Airway patency: patent  Nausea & Vomiting: no vomiting and no nausea  Complications: no  Cardiovascular status: hemodynamically stable  Respiratory status: acceptable  Hydration status: stable

## 2019-08-16 NOTE — ANESTHESIA PRE PROCEDURE
polyethylene glycol (GoLYTELY) solution 2,000 mL  2,000 mL Oral Once DESIREE Potter - CNP        [MAR Hold] promethazine (PHENERGAN) injection 12.5 mg  12.5 mg Intramuscular Q6H PRN DESIREE Potter - CNP        [MAR Hold] metoprolol (LOPRESSOR) injection 5 mg  5 mg Intravenous Q6H Car Dire, DO   5 mg at 08/16/19 1345    [MAR Hold] dextrose 5 % and 0.45 % NaCl with KCl 20 mEq infusion   Intravenous Continuous Hesham Sorensen  mL/hr at 08/16/19 1420      [MAR Hold] hydrALAZINE (APRESOLINE) injection 10 mg  10 mg Intravenous Q6H PRN Fernando Inch Orlop, DO   10 mg at 08/12/19 1553    [Held by provider] traZODone (DESYREL) tablet 100 mg  100 mg Oral Nightly Fernando [facility] Orlop, DO        [Held by provider] atorvastatin (LIPITOR) tablet 20 mg  20 mg Oral Daily Fernando Inch Orlop, DO        [MAR Hold] diphenhydrAMINE (BENADRYL) capsule 50 mg  50 mg Oral Q6H PRN Chintan BACA Orlop, DO        [MAR Hold] glucose (GLUTOSE) 40 % oral gel 15 g  15 g Oral PRN Fernando [facility] Orlop, DO        [MAR Hold] dextrose 50 % IV solution  12.5 g Intravenous PRN Fernando [facility] Orlop, DO        [MAR Hold] glucagon (rDNA) injection 1 mg  1 mg Intramuscular PRN Fernando [facility] Orlop, DO        [MAR Hold] dextrose 5 % solution  100 mL/hr Intravenous PRN Fernando [facility] Orlop, DO        [facility] Hold] sodium chloride flush 0.9 % injection 10 mL  10 mL Intravenous 2 times per day Fernando Inch Orlop, DO   10 mL at 08/15/19 0756    [MAR Hold] sodium chloride flush 0.9 % injection 10 mL  10 mL Intravenous PRN Fernando [facility] Orlop, DO        [facility] Hold] potassium chloride (KLOR-CON M) extended release tablet 40 mEq  40 mEq Oral PRN Fernando [facility] Orlop, DO        Or    [facility] Hold] potassium bicarb-citric acid (EFFER-K) effervescent tablet 40 mEq  40 mEq Oral PRN Fernandolore Saunders Orlop, DO        Or    [facility] Hold] potassium chloride 10 mEq/100 mL IVPB (Peripheral Line)  10 mEq Intravenous PRN Fernando Diallop,  mL/hr at 08/15/19 1517 10 mEq at 08/15/19 1517    [MAR Hold]

## 2019-08-16 NOTE — PROGRESS NOTES
chloride  10 mEq Intravenous Q1H    levofloxacin  500 mg Intravenous Q24H    OLANZapine  5 mg Intramuscular QPM    And    sterile water  2.1 mL Intramuscular QPM    metoprolol  5 mg Intravenous Q6H    [Held by provider] traZODone  100 mg Oral Nightly    [Held by provider] atorvastatin  20 mg Oral Daily    sodium chloride flush  10 mL Intravenous 2 times per day    enoxaparin  40 mg Subcutaneous Daily    insulin lispro  0-12 Units Subcutaneous Q6H    [Held by provider] Insulin Glargine-Lixisenatide  50 Units Subcutaneous Daily     Continuous Infusions:    dextrose 5% and 0.45% NaCl with KCl 20 mEq 125 mL/hr at 19 1344    dextrose       PRN Meds: hydrALAZINE, diphenhydrAMINE, glucose, dextrose, glucagon (rDNA), dextrose, sodium chloride flush, potassium chloride **OR** potassium alternative oral replacement **OR** potassium chloride, magnesium sulfate, magnesium hydroxide, ondansetron, nicotine, acetaminophen, atropine    Data:     Past Medical History:   has a past medical history of Depression, Diabetes mellitus ([address] and Thyroid disease. Social History:   reports that she quit smoking about 5 months ago. Her smoking use included cigarettes. She has a 40.00 pack-year smoking history. She has never used smokeless tobacco. She reports that she does not drink alcohol or use drugs. Family History:   Family History   Problem Relation Age of Onset    Diabetes Mother     Heart Disease Mother     Stroke Brother        Vitals:  BP (!) 147/96   Pulse 96   Temp 98.1 °F (36.7 °C) (Temporal)   Resp 18   Wt 185 lb 6.5 oz (84.1 kg)   SpO2 94%   BMI 30.85 kg/m²   Temp (24hrs), Av.2 °F (36.8 °C), Min:96 °F (35.6 °C), Max:101.2 °F (38.4 °C)    Recent Labs     08/15/19  19519  0018 19  0554 19  0649   POCGLU 178* 161* 166* 166*       I/O (24Hr):     Intake/Output Summary (Last 24 hours) at 2019 1357  Last data filed at 2019 1000  Gross per 24 hour   Intake 1250 ml Output 2325 ml   Net -1075 ml       Labs:  Hematology:  Recent Labs     08/14/19  0557 08/15/19  0626 08/16/19  0643   WBC 12.3* 15.4* 15.2*   RBC 5.12* 5.14* 5.06   HGB 14.4 14.9 14.5   HCT 47.1 47.0 46.6   MCV 92.0 91.4 92.1   MCH 28.1 29.0 28.7   MCHC 30.6 31.7 31.1   RDW 12.8 12.7 12.3    267 286   MPV 9.7 9.8 9.9   SEDRATE 10  --   --    CRP 7.0*  --   --      Chemistry:  Recent Labs     08/14/19  0557 08/15/19  0626 08/16/19  0643 08/16/19  0829    144 145*  --    K 3.4* 3.4* 3.2*  --    CL 97* 96* 101  --    CO2 35* 35* 31  --    GLUCOSE 178* 177* 182*  --    BUN 11 14 16  --    CREATININE 0.56 0.68 0.68  --    MG 2.3 2.0 2.0  --    ANIONGAP 11 13 13  --    LABGLOM >60 >60 >60  --    GFRAA >60 >60 >60  --    CALCIUM 9.2 9.2 8.7  --    LACTACIDWB  --   --   --  1.2     Recent Labs     08/14/19  0557  08/15/19  0626 08/15/19  1211 08/15/19  1254 08/15/19  1821 08/15/19  1952 08/16/19  0018 08/16/19  0554 08/16/19  0649   PROT 6.6  --   --   --   --   --   --   --   --   --    LABALBU 3.2*  --   --   --   --   --   --   --   --   --    TSH  --   --  3.83  --   --   --   --   --   --   --    AST 23  --   --   --   --   --   --   --   --   --    ALT 36*  --   --   --   --   --   --   --   --   --    ALKPHOS 93  --   --   --   --   --   --   --   --   --    BILITOT 0.40  --   --   --   --   --   --   --   --   --    AMMONIA  --   --   --  73*  --   --   --   --   --   --    POCGLU  --    < >  --   --  182* 185* 178* 161* 166* 166*    < > = values in this interval not displayed.      ABG:  Lab Results   Component Value Date    FIO2 INFORMATION NOT PROVIDED 08/15/2019     Lab Results   Component Value Date/Time    SPECIAL NOT REPORTED 08/06/2019 03:30 PM     Lab Results   Component Value Date/Time    CULTURE NO GROWTH 6 DAYS 08/06/2019 03:30 PM       Radiology:  Ct Abdomen Pelvis Wo Contrast Additional Contrast? None    Result Date: 8/10/2019  *Colonic distension with air and fecal material noted most

## 2019-08-16 NOTE — PLAN OF CARE
Siderails up x2   Hourly rounding   Call light in reach  Instructed to call for assist before attempting out of bed.   Remains free from falls and accidental injury at this time   Floor free from obstacles  Bed is locked and in lowest position   Adequate lighting provided   Bed alarm on, Red falling star and Stay with Me signs posted

## 2019-08-16 NOTE — PROGRESS NOTES
General Surgery:  Daily Progress Note          PATIENT NAME: Abebe Rick     TODAY'S DATE: 8/16/2019, 6:22 AM    SUBJECTIVE:     Pt seen and examined at bedside. Pt remains non-responsive and non-cooperative with exam. Per nurse, pt has been more alert overnight and this AM. No fever, chills, vomiting, chest pain, and SOB. Nurse reports episodes of gagging (nausea) - was given Zofran. NPO diet. Received enema last night and 3 large, loose BM. No urination. OBJECTIVE:   VITALS:  /87   Pulse 102   Temp 96 °F (35.6 °C) (Oral)   Resp 20   Wt 185 lb 6.5 oz (84.1 kg)   SpO2 97%   BMI 30.85 kg/m²      INTAKE/OUTPUT:      Intake/Output Summary (Last 24 hours) at 8/16/2019 0622  Last data filed at 8/16/2019 0510  Gross per 24 hour   Intake 1250 ml   Output 2850 ml   Net -1600 ml       PHYSICAL EXAM:  General Appearance: awake, alert, in mild distress. HEENT:  Normocephalic, atraumatic  NGT securely in place. Heart: Heart sounds are normal.  Regular rate and rhythm without murmur, gallop or rub. Lungs: Normal expansion. Clear to auscultation. No rales, rhonchi, or wheezing. Abdomen: Soft, non-distended, diffuse mild tenderness with crying upon palpation   Extremities: No cyanosis, pitting edema, rashes noted. Skin: Skin color, texture, turgor normal. No rashes or lesions.     IV Access: L brachial - potassium chloride  NG Tube output: 2,300mL total output yesterday     Data:  CBC with Differential:    Lab Results   Component Value Date    WBC 15.4 08/15/2019    RBC 5.14 08/15/2019    HGB 14.9 08/15/2019    HCT 47.0 08/15/2019     08/15/2019    MCV 91.4 08/15/2019    MCH 29.0 08/15/2019    MCHC 31.7 08/15/2019    RDW 12.7 08/15/2019    LYMPHOPCT 12 08/15/2019    MONOPCT 6 08/15/2019    BASOPCT 1 08/15/2019    MONOSABS 0.91 08/15/2019    LYMPHSABS 1.78 08/15/2019    EOSABS 0.24 08/15/2019    BASOSABS 0.08 08/15/2019    DIFFTYPE NOT REPORTED 08/15/2019     CMP:    Lab Results   Component Value

## 2019-08-17 ENCOUNTER — APPOINTMENT (OUTPATIENT)
Dept: GENERAL RADIOLOGY | Age: 59
DRG: 388 | End: 2019-08-17
Attending: INTERNAL MEDICINE
Payer: MEDICARE

## 2019-08-17 LAB
ABSOLUTE EOS #: 0.18 K/UL (ref 0–0.44)
ABSOLUTE IMMATURE GRANULOCYTE: 0.04 K/UL (ref 0–0.3)
ABSOLUTE LYMPH #: 2.62 K/UL (ref 1.1–3.7)
ABSOLUTE MONO #: 0.73 K/UL (ref 0.1–1.2)
AMMONIA: 52 UMOL/L (ref 11–51)
ANION GAP SERPL CALCULATED.3IONS-SCNC: 10 MMOL/L (ref 9–17)
BASOPHILS # BLD: 1 % (ref 0–2)
BASOPHILS ABSOLUTE: 0.08 K/UL (ref 0–0.2)
BUN BLDV-MCNC: 15 MG/DL (ref 6–20)
BUN/CREAT BLD: ABNORMAL (ref 9–20)
CALCIUM SERPL-MCNC: 8.7 MG/DL (ref 8.6–10.4)
CHLORIDE BLD-SCNC: 101 MMOL/L (ref 98–107)
CO2: 33 MMOL/L (ref 20–31)
CREAT SERPL-MCNC: 0.73 MG/DL (ref 0.5–0.9)
DIFFERENTIAL TYPE: NORMAL
EOSINOPHILS RELATIVE PERCENT: 2 % (ref 1–4)
GFR AFRICAN AMERICAN: >60 ML/MIN
GFR NON-AFRICAN AMERICAN: >60 ML/MIN
GFR SERPL CREATININE-BSD FRML MDRD: ABNORMAL ML/MIN/{1.73_M2}
GFR SERPL CREATININE-BSD FRML MDRD: ABNORMAL ML/MIN/{1.73_M2}
GLUCOSE BLD-MCNC: 146 MG/DL (ref 65–105)
GLUCOSE BLD-MCNC: 150 MG/DL (ref 65–105)
GLUCOSE BLD-MCNC: 156 MG/DL (ref 70–99)
GLUCOSE BLD-MCNC: 162 MG/DL (ref 65–105)
GLUCOSE BLD-MCNC: 163 MG/DL (ref 65–105)
GLUCOSE BLD-MCNC: 179 MG/DL (ref 65–105)
HCT VFR BLD CALC: 40.2 % (ref 36.3–47.1)
HEMOGLOBIN: 13.1 G/DL (ref 11.9–15.1)
IMMATURE GRANULOCYTES: 0 %
LYMPHOCYTES # BLD: 25 % (ref 24–43)
MCH RBC QN AUTO: 29.4 PG (ref 25.2–33.5)
MCHC RBC AUTO-ENTMCNC: 32.6 G/DL (ref 28.4–34.8)
MCV RBC AUTO: 90.3 FL (ref 82.6–102.9)
MONOCYTES # BLD: 7 % (ref 3–12)
NRBC AUTOMATED: 0 PER 100 WBC
PDW BLD-RTO: 12.3 % (ref 11.8–14.4)
PLATELET # BLD: 243 K/UL (ref 138–453)
PLATELET ESTIMATE: NORMAL
PMV BLD AUTO: 10 FL (ref 8.1–13.5)
POTASSIUM SERPL-SCNC: 3.7 MMOL/L (ref 3.7–5.3)
RBC # BLD: 4.45 M/UL (ref 3.95–5.11)
RBC # BLD: NORMAL 10*6/UL
SEG NEUTROPHILS: 65 % (ref 36–65)
SEGMENTED NEUTROPHILS ABSOLUTE COUNT: 6.98 K/UL (ref 1.5–8.1)
SODIUM BLD-SCNC: 144 MMOL/L (ref 135–144)
WBC # BLD: 10.6 K/UL (ref 3.5–11.3)
WBC # BLD: NORMAL 10*3/UL

## 2019-08-17 PROCEDURE — 36415 COLL VENOUS BLD VENIPUNCTURE: CPT

## 2019-08-17 PROCEDURE — 6360000002 HC RX W HCPCS: Performed by: INTERNAL MEDICINE

## 2019-08-17 PROCEDURE — 85025 COMPLETE CBC W/AUTO DIFF WBC: CPT

## 2019-08-17 PROCEDURE — 51701 INSERT BLADDER CATHETER: CPT

## 2019-08-17 PROCEDURE — 99232 SBSQ HOSP IP/OBS MODERATE 35: CPT | Performed by: NURSE PRACTITIONER

## 2019-08-17 PROCEDURE — 74018 RADEX ABDOMEN 1 VIEW: CPT

## 2019-08-17 PROCEDURE — 94760 N-INVAS EAR/PLS OXIMETRY 1: CPT

## 2019-08-17 PROCEDURE — 2060000000 HC ICU INTERMEDIATE R&B

## 2019-08-17 PROCEDURE — 51798 US URINE CAPACITY MEASURE: CPT

## 2019-08-17 PROCEDURE — 2500000003 HC RX 250 WO HCPCS: Performed by: INTERNAL MEDICINE

## 2019-08-17 PROCEDURE — 99232 SBSQ HOSP IP/OBS MODERATE 35: CPT | Performed by: INTERNAL MEDICINE

## 2019-08-17 PROCEDURE — 80048 BASIC METABOLIC PNL TOTAL CA: CPT

## 2019-08-17 PROCEDURE — 6370000000 HC RX 637 (ALT 250 FOR IP): Performed by: INTERNAL MEDICINE

## 2019-08-17 PROCEDURE — 82140 ASSAY OF AMMONIA: CPT

## 2019-08-17 PROCEDURE — 6370000000 HC RX 637 (ALT 250 FOR IP): Performed by: NURSE PRACTITIONER

## 2019-08-17 PROCEDURE — 2700000000 HC OXYGEN THERAPY PER DAY

## 2019-08-17 RX ORDER — SENNA PLUS 8.6 MG/1
2 TABLET ORAL 2 TIMES DAILY
Status: DISCONTINUED | OUTPATIENT
Start: 2019-08-17 | End: 2019-08-29 | Stop reason: HOSPADM

## 2019-08-17 RX ADMIN — LEVOFLOXACIN 500 MG: 5 INJECTION, SOLUTION INTRAVENOUS at 13:34

## 2019-08-17 RX ADMIN — SENNOSIDES 17.2 MG: 8.6 TABLET, FILM COATED ORAL at 13:58

## 2019-08-17 RX ADMIN — METOPROLOL TARTRATE 5 MG: 1 INJECTION, SOLUTION INTRAVENOUS at 13:41

## 2019-08-17 RX ADMIN — POTASSIUM CHLORIDE, DEXTROSE MONOHYDRATE AND SODIUM CHLORIDE: 150; 5; 450 INJECTION, SOLUTION INTRAVENOUS at 10:12

## 2019-08-17 RX ADMIN — POTASSIUM CHLORIDE, DEXTROSE MONOHYDRATE AND SODIUM CHLORIDE: 150; 5; 450 INJECTION, SOLUTION INTRAVENOUS at 23:19

## 2019-08-17 RX ADMIN — POTASSIUM CHLORIDE, DEXTROSE MONOHYDRATE AND SODIUM CHLORIDE: 150; 5; 450 INJECTION, SOLUTION INTRAVENOUS at 01:21

## 2019-08-17 RX ADMIN — INSULIN LISPRO 2 UNITS: 100 INJECTION, SOLUTION INTRAVENOUS; SUBCUTANEOUS at 13:41

## 2019-08-17 RX ADMIN — INSULIN LISPRO 2 UNITS: 100 INJECTION, SOLUTION INTRAVENOUS; SUBCUTANEOUS at 20:38

## 2019-08-17 RX ADMIN — INSULIN LISPRO 2 UNITS: 100 INJECTION, SOLUTION INTRAVENOUS; SUBCUTANEOUS at 06:26

## 2019-08-17 RX ADMIN — ENOXAPARIN SODIUM 40 MG: 40 INJECTION SUBCUTANEOUS at 10:06

## 2019-08-17 RX ADMIN — METOPROLOL TARTRATE 5 MG: 1 INJECTION, SOLUTION INTRAVENOUS at 06:27

## 2019-08-17 RX ADMIN — SENNOSIDES 17.2 MG: 8.6 TABLET, FILM COATED ORAL at 23:11

## 2019-08-17 RX ADMIN — METOPROLOL TARTRATE 5 MG: 1 INJECTION, SOLUTION INTRAVENOUS at 20:39

## 2019-08-17 NOTE — PROGRESS NOTES
Pt withdraws from pain in all extremities, minor facial grimacing and moaning noted upon repositioning pt. Bladder scanner showed 294ml. D5 1.5 NS+20k @ 100ml/hr, will recheck in a few hours. NGT put out 700ml since 0600. Vitals have been stable, afebrile, continue to monitor.

## 2019-08-17 NOTE — PROGRESS NOTES
THE [facility] [place] [facility] Progress Note    Kimmy Pena is a 62 y.o. female patient. Hospitalization Day:6      Chief consult reason:   Colonic obstruction    Subjective:  Pt seen and examined. Pt continues to be non-verbal during assessment. Pt had repeat colonoscopy yesterday for decompression. Abdomen is much softer today but pt still has facial grimacing while touching lower abdomen area. NGT continues with large bilious out put    VITALS:  /72   Pulse 81   Temp 98.4 °F (36.9 °C) (Axillary)   Resp 13   Ht 5' 5\" (1.651 m)   Wt 185 lb 10 oz (84.2 kg)   SpO2 96%   BMI 30.89 kg/m²   TEMPERATURE:  Current - Temp: 98.4 °F (36.9 °C); Max - Temp  Av.9 °F (36.6 °C)  Min: 96.2 °F (35.7 °C)  Max: 99.9 °F (37.7 °C)    Physical Assessment:  General appearance:  Sleeping, non-verbal  Mental Status: DEON  Lungs:  clear to auscultation bilaterally, normal effort  Heart:  regular rate and rhythm, no murmur  Abdomen:  soft, very tender in RUQ, nondistended, hypoactive bowel sounds, no masses, hepatomegaly, splenomegaly  Extremities:  no edema, redness, tenderness in the calves  Skin:  no gross lesions, rashes, induration    Data Review:  LABS and IMAGING:     CBC  Recent Labs     08/15/19  0626 19  0643 19  0831   WBC 15.4* 15.2* 10.6   HGB 14.9 14.5 13.1   MCV 91.4 92.1 90.3   RDW 12.7 12.3 12.3    286 243       ANEMIA STUDIES  Recent Labs     08/15/19  0626   [id number]   FOLATE 6.7       BMP  Recent Labs     08/15/19  0626 19  0643 19  0831    145* 144   K 3.4* 3.2* 3.7   CL 96* 101 101   CO2 35* 31 33*   BUN 14 16 15   CREATININE 0.68 0.68 0.73   GLUCOSE 177* 182* 156*   CALCIUM 9.2 8.7 8.7       LFTS  No results for input(s): ALKPHOS, ALT, AST, BILITOT, BILIDIR, LABALBU in the last 72 hours.     AMYLASE/LIPASE/AMMONIA  Recent Labs     08/15/19  1211 19  1739 19  0831   AMMONIA 73* 34 52*       Acute Hepatitis Panel   No results found for:

## 2019-08-17 NOTE — PROGRESS NOTES
General Surgery:  Daily Progress Note          PATIENT NAME: Cammy Rick     TODAY'S DATE: 8/17/2019, 5:31 AM    SUBJECTIVE:     Pt seen and examined at bedside. Pt continues to be unarousable and non-cooperative with exam. Per nurse, pt has been more alert overnight and this AM. S/p total coloscopy for colon decompression 8/16 per GI, KUB this am. Nurse reports No acute events overnight      OBJECTIVE:   VITALS:  /73   Pulse 77   Temp 99.9 °F (37.7 °C) (Temporal)   Resp 15   Ht 5' 5\" (1.651 m)   Wt 185 lb 6.5 oz (84.1 kg)   SpO2 96%   BMI 30.85 kg/m²      INTAKE/OUTPUT:      Intake/Output Summary (Last 24 hours) at 8/17/2019 0531  Last data filed at 8/17/2019 0000  Gross per 24 hour   Intake 400 ml   Output 1525 ml   Net -1125 ml       PHYSICAL EXAM:  General Appearance: awake, alert, in mild distress. HEENT:  Normocephalic, atraumatic  NGT securely in place. Heart: Heart sounds are normal.  Regular rate and rhythm without murmur, gallop or rub. Lungs: Normal expansion. Clear to auscultation. No rales, rhonchi, or wheezing. Abdomen: Soft, non-distended, No TTP elicited   Extremities: No cyanosis, pitting edema, rashes noted. Skin: Skin color, texture, turgor normal. No rashes or lesions.     IV Access: L brachial - potassium chloride  NG Tube output: 2,300mL total output yesterday     Data:  CBC with Differential:    Lab Results   Component Value Date    WBC 15.2 08/16/2019    RBC 5.06 08/16/2019    HGB 14.5 08/16/2019    HCT 46.6 08/16/2019     08/16/2019    MCV 92.1 08/16/2019    MCH 28.7 08/16/2019    MCHC 31.1 08/16/2019    RDW 12.3 08/16/2019    LYMPHOPCT 14 08/16/2019    MONOPCT 6 08/16/2019    BASOPCT 1 08/16/2019    MONOSABS 0.98 08/16/2019    LYMPHSABS 2.15 08/16/2019    EOSABS 0.18 08/16/2019    BASOSABS 0.09 08/16/2019    DIFFTYPE NOT REPORTED 08/16/2019     CMP:    Lab Results   Component Value Date     08/16/2019    K 3.2 08/16/2019     08/16/2019    CO2 31

## 2019-08-18 ENCOUNTER — APPOINTMENT (OUTPATIENT)
Dept: GENERAL RADIOLOGY | Age: 59
DRG: 388 | End: 2019-08-18
Attending: INTERNAL MEDICINE
Payer: MEDICARE

## 2019-08-18 LAB
-: NORMAL
ABSOLUTE EOS #: 0.15 K/UL (ref 0–0.44)
ABSOLUTE IMMATURE GRANULOCYTE: 0.04 K/UL (ref 0–0.3)
ABSOLUTE LYMPH #: 2.64 K/UL (ref 1.1–3.7)
ABSOLUTE MONO #: 0.64 K/UL (ref 0.1–1.2)
AMMONIA: 43 UMOL/L (ref 11–51)
AMORPHOUS: NORMAL
ANION GAP SERPL CALCULATED.3IONS-SCNC: 10 MMOL/L (ref 9–17)
BACTERIA: NORMAL
BASOPHILS # BLD: 1 % (ref 0–2)
BASOPHILS ABSOLUTE: 0.06 K/UL (ref 0–0.2)
BILIRUBIN URINE: NEGATIVE
BUN BLDV-MCNC: 14 MG/DL (ref 6–20)
BUN/CREAT BLD: ABNORMAL (ref 9–20)
CALCIUM SERPL-MCNC: 8.7 MG/DL (ref 8.6–10.4)
CASTS UA: NORMAL /LPF (ref 0–8)
CHLORIDE BLD-SCNC: 97 MMOL/L (ref 98–107)
CO2: 33 MMOL/L (ref 20–31)
COLOR: YELLOW
COMMENT UA: ABNORMAL
CREAT SERPL-MCNC: 0.67 MG/DL (ref 0.5–0.9)
CRYSTALS, UA: NORMAL /HPF
DIFFERENTIAL TYPE: NORMAL
EOSINOPHILS RELATIVE PERCENT: 2 % (ref 1–4)
EPITHELIAL CELLS UA: NORMAL /HPF (ref 0–5)
GFR AFRICAN AMERICAN: >60 ML/MIN
GFR NON-AFRICAN AMERICAN: >60 ML/MIN
GFR SERPL CREATININE-BSD FRML MDRD: ABNORMAL ML/MIN/{1.73_M2}
GFR SERPL CREATININE-BSD FRML MDRD: ABNORMAL ML/MIN/{1.73_M2}
GLUCOSE BLD-MCNC: 137 MG/DL (ref 65–105)
GLUCOSE BLD-MCNC: 140 MG/DL (ref 65–105)
GLUCOSE BLD-MCNC: 151 MG/DL (ref 65–105)
GLUCOSE BLD-MCNC: 165 MG/DL (ref 70–99)
GLUCOSE URINE: NEGATIVE
HCT VFR BLD CALC: 39.6 % (ref 36.3–47.1)
HEMOGLOBIN: 12.9 G/DL (ref 11.9–15.1)
IMMATURE GRANULOCYTES: 0 %
KETONES, URINE: NEGATIVE
LEUKOCYTE ESTERASE, URINE: ABNORMAL
LYMPHOCYTES # BLD: 30 % (ref 24–43)
MAGNESIUM: 1.9 MG/DL (ref 1.6–2.6)
MCH RBC QN AUTO: 28 PG (ref 25.2–33.5)
MCHC RBC AUTO-ENTMCNC: 32.6 G/DL (ref 28.4–34.8)
MCV RBC AUTO: 86.1 FL (ref 82.6–102.9)
MONOCYTES # BLD: 7 % (ref 3–12)
MUCUS: NORMAL
NITRITE, URINE: NEGATIVE
NRBC AUTOMATED: 0 PER 100 WBC
OTHER OBSERVATIONS UA: NORMAL
PDW BLD-RTO: 12.3 % (ref 11.8–14.4)
PH UA: >9 (ref 5–8)
PLATELET # BLD: 225 K/UL (ref 138–453)
PLATELET ESTIMATE: NORMAL
PMV BLD AUTO: 9.8 FL (ref 8.1–13.5)
POTASSIUM SERPL-SCNC: 3.4 MMOL/L (ref 3.7–5.3)
POTASSIUM SERPL-SCNC: 3.8 MMOL/L (ref 3.7–5.3)
PROTEIN UA: ABNORMAL
RBC # BLD: 4.6 M/UL (ref 3.95–5.11)
RBC # BLD: NORMAL 10*6/UL
RBC UA: NORMAL /HPF (ref 0–4)
RENAL EPITHELIAL, UA: NORMAL /HPF
SEG NEUTROPHILS: 60 % (ref 36–65)
SEGMENTED NEUTROPHILS ABSOLUTE COUNT: 5.41 K/UL (ref 1.5–8.1)
SODIUM BLD-SCNC: 140 MMOL/L (ref 135–144)
SPECIFIC GRAVITY UA: 1.02 (ref 1–1.03)
TRICHOMONAS: NORMAL
TURBIDITY: CLEAR
URINE HGB: NEGATIVE
UROBILINOGEN, URINE: NORMAL
WBC # BLD: 8.9 K/UL (ref 3.5–11.3)
WBC # BLD: NORMAL 10*3/UL
WBC UA: NORMAL /HPF (ref 0–5)
YEAST: NORMAL

## 2019-08-18 PROCEDURE — 87327 CRYPTOCOCCUS NEOFORM AG IA: CPT

## 2019-08-18 PROCEDURE — 85025 COMPLETE CBC W/AUTO DIFF WBC: CPT

## 2019-08-18 PROCEDURE — 2060000000 HC ICU INTERMEDIATE R&B

## 2019-08-18 PROCEDURE — 2500000003 HC RX 250 WO HCPCS: Performed by: INTERNAL MEDICINE

## 2019-08-18 PROCEDURE — 6360000002 HC RX W HCPCS: Performed by: INTERNAL MEDICINE

## 2019-08-18 PROCEDURE — 6370000000 HC RX 637 (ALT 250 FOR IP): Performed by: NURSE PRACTITIONER

## 2019-08-18 PROCEDURE — 87070 CULTURE OTHR SPECIMN AEROBIC: CPT

## 2019-08-18 PROCEDURE — 83735 ASSAY OF MAGNESIUM: CPT

## 2019-08-18 PROCEDURE — 80048 BASIC METABOLIC PNL TOTAL CA: CPT

## 2019-08-18 PROCEDURE — 81001 URINALYSIS AUTO W/SCOPE: CPT

## 2019-08-18 PROCEDURE — 94761 N-INVAS EAR/PLS OXIMETRY MLT: CPT

## 2019-08-18 PROCEDURE — 74018 RADEX ABDOMEN 1 VIEW: CPT

## 2019-08-18 PROCEDURE — 6370000000 HC RX 637 (ALT 250 FOR IP): Performed by: INTERNAL MEDICINE

## 2019-08-18 PROCEDURE — 87483 CNS DNA AMP PROBE TYPE 12-25: CPT

## 2019-08-18 PROCEDURE — 82945 GLUCOSE OTHER FLUID: CPT

## 2019-08-18 PROCEDURE — 36415 COLL VENOUS BLD VENIPUNCTURE: CPT

## 2019-08-18 PROCEDURE — 2580000003 HC RX 258: Performed by: INTERNAL MEDICINE

## 2019-08-18 PROCEDURE — 84132 ASSAY OF SERUM POTASSIUM: CPT

## 2019-08-18 PROCEDURE — 87205 SMEAR GRAM STAIN: CPT

## 2019-08-18 PROCEDURE — 99232 SBSQ HOSP IP/OBS MODERATE 35: CPT | Performed by: INTERNAL MEDICINE

## 2019-08-18 PROCEDURE — 99232 SBSQ HOSP IP/OBS MODERATE 35: CPT | Performed by: NURSE PRACTITIONER

## 2019-08-18 PROCEDURE — 84157 ASSAY OF PROTEIN OTHER: CPT

## 2019-08-18 PROCEDURE — 51798 US URINE CAPACITY MEASURE: CPT

## 2019-08-18 PROCEDURE — 82140 ASSAY OF AMMONIA: CPT

## 2019-08-18 PROCEDURE — 2700000000 HC OXYGEN THERAPY PER DAY

## 2019-08-18 PROCEDURE — 87086 URINE CULTURE/COLONY COUNT: CPT

## 2019-08-18 PROCEDURE — 86592 SYPHILIS TEST NON-TREP QUAL: CPT

## 2019-08-18 PROCEDURE — 82947 ASSAY GLUCOSE BLOOD QUANT: CPT

## 2019-08-18 RX ORDER — POTASSIUM CHLORIDE 7.45 MG/ML
10 INJECTION INTRAVENOUS
Status: DISPENSED | OUTPATIENT
Start: 2019-08-18 | End: 2019-08-18

## 2019-08-18 RX ORDER — SODIUM CHLORIDE 9 MG/ML
INJECTION, SOLUTION INTRAVENOUS CONTINUOUS
Status: DISCONTINUED | OUTPATIENT
Start: 2019-08-18 | End: 2019-08-22

## 2019-08-18 RX ADMIN — POTASSIUM CHLORIDE 10 MEQ: 7.46 INJECTION, SOLUTION INTRAVENOUS at 11:57

## 2019-08-18 RX ADMIN — METOPROLOL TARTRATE 5 MG: 1 INJECTION, SOLUTION INTRAVENOUS at 02:46

## 2019-08-18 RX ADMIN — LEVOFLOXACIN 500 MG: 5 INJECTION, SOLUTION INTRAVENOUS at 14:57

## 2019-08-18 RX ADMIN — METOPROLOL TARTRATE 5 MG: 1 INJECTION, SOLUTION INTRAVENOUS at 08:52

## 2019-08-18 RX ADMIN — POTASSIUM CHLORIDE 10 MEQ: 7.46 INJECTION, SOLUTION INTRAVENOUS at 10:16

## 2019-08-18 RX ADMIN — WATER 2.1 ML: 1 INJECTION INTRAMUSCULAR; INTRAVENOUS; SUBCUTANEOUS at 17:54

## 2019-08-18 RX ADMIN — Medication 10 MEQ: at 13:06

## 2019-08-18 RX ADMIN — POTASSIUM CHLORIDE 10 MEQ: 7.46 INJECTION, SOLUTION INTRAVENOUS at 08:53

## 2019-08-18 RX ADMIN — SENNOSIDES 17.2 MG: 8.6 TABLET, FILM COATED ORAL at 08:52

## 2019-08-18 RX ADMIN — METOPROLOL TARTRATE 5 MG: 1 INJECTION, SOLUTION INTRAVENOUS at 16:31

## 2019-08-18 RX ADMIN — INSULIN LISPRO 2 UNITS: 100 INJECTION, SOLUTION INTRAVENOUS; SUBCUTANEOUS at 20:38

## 2019-08-18 RX ADMIN — ENOXAPARIN SODIUM 40 MG: 40 INJECTION SUBCUTANEOUS at 08:52

## 2019-08-18 RX ADMIN — INSULIN LISPRO 2 UNITS: 100 INJECTION, SOLUTION INTRAVENOUS; SUBCUTANEOUS at 10:06

## 2019-08-18 RX ADMIN — SODIUM CHLORIDE: 9 INJECTION, SOLUTION INTRAVENOUS at 14:56

## 2019-08-18 RX ADMIN — INSULIN LISPRO 2 UNITS: 100 INJECTION, SOLUTION INTRAVENOUS; SUBCUTANEOUS at 02:45

## 2019-08-18 RX ADMIN — I.V. FAT EMULSION 250 ML: 20 EMULSION INTRAVENOUS at 17:55

## 2019-08-18 RX ADMIN — METOPROLOL TARTRATE 5 MG: 1 INJECTION, SOLUTION INTRAVENOUS at 21:50

## 2019-08-18 RX ADMIN — OLANZAPINE 5 MG: 10 INJECTION, POWDER, LYOPHILIZED, FOR SOLUTION INTRAMUSCULAR at 17:53

## 2019-08-18 RX ADMIN — SENNOSIDES 17.2 MG: 8.6 TABLET, FILM COATED ORAL at 21:49

## 2019-08-18 RX ADMIN — CALCIUM GLUCONATE: 98 INJECTION, SOLUTION INTRAVENOUS at 17:55

## 2019-08-18 RX ADMIN — SODIUM CHLORIDE, PRESERVATIVE FREE 10 ML: 5 INJECTION INTRAVENOUS at 09:06

## 2019-08-18 ASSESSMENT — PAIN SCALES - GENERAL
PAINLEVEL_OUTOF10: 0
PAINLEVEL_OUTOF10: 0

## 2019-08-18 ASSESSMENT — PAIN SCALES - WONG BAKER: WONGBAKER_NUMERICALRESPONSE: 0

## 2019-08-18 NOTE — PLAN OF CARE
FALLS:Patient assessed for fall risk and fall precautions initiated as needed. Patient and family  instructed about safety devices and allowed to make decisions related to safey. Environment kept free of clutter and adequate lighting provided. Bed in lowest position with brakes locked. Call light in reach. Patient ID band correct and in place. Patient transferred with appropriate methods. Will continue to monitor. SKIN:Pt free from new skin issues. Pt turned and repositioned every two hours to prevent skin breakdown. Pt brief checked and changed every two hours. PAIN:Pt able to communicate needs for pain medications and states satisfaction with outcome.

## 2019-08-18 NOTE — PROGRESS NOTES
· Anthropometric Measures:  · Ht: 5' 5\" (165.1 cm)   · Current Body Wt: 187 lb (84.8 kg)  · Admission Body Wt: 184 lb (83.5 kg)  · % Weight Change:  ,  wt flux since admission   · Ideal Body Wt: 125 lb (56.7 kg), % Ideal Body 147%  · BMI Classification: BMI 30.0 - 34.9 Obese Class I(30.9)    Nutrition Interventions:   Continue NPO, Start Parenteral Nutrition  Continued Inpatient Monitoring, Education Not Indicated    Nutrition Evaluation:   · Evaluation: Progressing toward goals   · Goals: Start of nutrition within 48 hours.     · Monitoring: Nutrition Progression, PN Intake, PN Tolerance, Skin Integrity, Wound Healing, I&O, Pertinent Labs, Weight, Monitor Bowel Function      Electronically signed by Joe Escobar RD, LD on 8/18/19 at 12:16 PM    Contact Number: [phone]

## 2019-08-18 NOTE — PROGRESS NOTES
level evaluation is recommended. *Hepatic steatosis. The findings were sent to the Radiology Results Communication Center at 12:46 pm on 8/10/2019to be communicated to a licensed caregiver. Xr Abdomen (kub) (single Ap View)    Result Date: 8/13/2019  1. Enteric tube appears unchanged in position. 2. Nonspecific bowel gas pattern. 3. Scattered stool in the colon. Xr Abdomen (kub) (single Ap View)    Result Date: 8/12/2019  Interval placement of enteric tube in the distal stomach and colonic decompression. No dilated loops of bowel identified. Xr Abdomen (kub) (single Ap View)    Result Date: 8/12/2019  Persistent gaseous distention of the colon, with slight increased in degree of distention of the cecum. Has cecal volvulus been considered? Xr Abdomen (kub) (single Ap View)    Result Date: 8/11/2019  Slight reduction in left colonic diameter. The cecum remains fairly distended. No pneumatosis or free air. Xr Abdomen (kub) (single Ap View)    Result Date: 8/10/2019  Distal aspect of the patient's NG tube appears to be within the stomach. The side port appears to be at the GE junction. This could be advanced approximately 3-4 cm for optimal placement. RECOMMENDATION: Findings were discussed with Saranya LUZ at 2:44 pm on 8/10/2019. Xr Acute Abd Series Chest 1 Vw    Result Date: 8/10/2019  Dilation of multiple bowel loops in the abdomen and pelvis. It is unclear whether this represents severe ileus or bowel obstruction. No evidence of free air to suggest perforation. RECOMMENDATION: Abdominal CT for further evaluation. Ct Head Wo Contrast    Result Date: 8/10/2019  No acute intracranial abnormality. Xr Chest Portable    Result Date: 8/9/2019  No change from prior study. Top-normal central vascularity. Unchanged left basilar opacity.      Xr Chest Portable    Result Date: 8/8/2019  Mild central congestion with left lower lung opacity, stable from prior exam.       Physical

## 2019-08-18 NOTE — PROGRESS NOTES
General Surgery Resident Statement/Note:  I have discussed the case, including pertinent history and exam findings with the below medical student. I have personally seen the patient. Pt seen and examined at bedside. I performed both history and physical exam.     Patient does not follow commands or respond to questions. Patient softly distended unchanged from yesterday. Continues to have large amount of NGT output-2,300cc/daily     AM KUB with improvement in colonic and cecal distention. No dilated small bowel. Plan:   1. Serial abdominal exams  2. GI following and monitoring need for decompression. 3. No acute surgical intervention planned at this time  4. Begin PPN     Jaky Daigle D.O. General Surgery PGY-1       General Surgery:  Daily Progress Note          PATIENT NAME: Cammy Rick     TODAY'S DATE: 8/18/2019, 7:51 AM    SUBJECTIVE:     Pt seen and examined at bedside. Tmax 99.9 overnight. Pt alert and cooperative with exam, still disoriented and unable to verbally communicate or follow commands. No fever, chills, nausea, vomiting, and SOB. NPO diet. Last BM 08/16. No flatus or BM since then. Issa placed 0400 due to retention on bladder scan and no urine output w/o straight cath >3x. OBJECTIVE:   VITALS:  /72   Pulse 69   Temp 97.9 °F (36.6 °C) (Temporal)   Resp 15   Ht 5' 5\" (1.651 m)   Wt 187 lb 6.3 oz (85 kg)   SpO2 93%   BMI 31.18 kg/m²      INTAKE/OUTPUT:      Intake/Output Summary (Last 24 hours) at 8/18/2019 0751  Last data filed at 8/18/2019 0500  Gross per 24 hour   Intake 2925.91 ml   Output 2830 ml   Net 95.91 ml     PHYSICAL EXAM:  General Appearance: awake, alert, oriented, in no acute distress  HEENT:  Normocephalic, atraumatic  NGT securely in place. Heart: Heart sounds are normal.  Regular rate and rhythm without murmur, gallop or rub. Lungs: Normal expansion. Clear to auscultation. No rales, rhonchi, or wheezing.   Abdomen: Soft, mild distention with guarding, minimal moaning/crying with palpation, normoactive bowel sounds  Extremities: No cyanosis, pitting edema, rashes noted. Skin: Skin color, texture, turgor normal. No rashes or lesions. IV Access:  L arm  Issa: placed 0400 due to retention, 650mL overnight, 0mL in bag    Data:  CBC with Differential:    Lab Results   Component Value Date    WBC 8.9 08/18/2019    RBC 4.60 08/18/2019    HGB 12.9 08/18/2019    HCT 39.6 08/18/2019     08/18/2019    MCV 86.1 08/18/2019    MCH 28.0 08/18/2019    MCHC 32.6 08/18/2019    RDW 12.3 08/18/2019    LYMPHOPCT 30 08/18/2019    MONOPCT 7 08/18/2019    BASOPCT 1 08/18/2019    MONOSABS 0.64 08/18/2019    LYMPHSABS 2.64 08/18/2019    EOSABS 0.15 08/18/2019    BASOSABS 0.06 08/18/2019    DIFFTYPE NOT REPORTED 08/18/2019     CMP:    Lab Results   Component Value Date     08/18/2019    K 3.4 08/18/2019    CL 97 08/18/2019    CO2 33 08/18/2019    BUN 14 08/18/2019    CREATININE 0.67 08/18/2019    GFRAA >60 08/18/2019    LABGLOM >60 08/18/2019    GLUCOSE 165 08/18/2019    PROT 6.6 08/14/2019    LABALBU 3.2 08/14/2019    CALCIUM 8.7 08/18/2019    BILITOT 0.40 08/14/2019    ALKPHOS 93 08/14/2019    AST 23 08/14/2019    ALT 36 08/14/2019       Radiology Review:    XR ABDOMEN (KUB) (08/18/19): Pending    XR ABDOMEN (KUB) (08/17/19): Impression   Persistent cecal dilation with decreased caliber of the remainder of the   colon and the rectum.  Additionally, there is a paucity of small bowel gas. The findings may be related to improved colonic ileus. Cristino Washburn are no findings   to suggest obstruction. CT ABDOMEN WITH IV CONTRAST (08/16/19): Impression   1.  The colon is diffusely distended with air, which is slightly increased   from 5 days prior.  The cecum now measures up to 12 cm in size.  This may be   chronic for the patient, and can be seen with Lorenza's syndrome.  No   evidence of pneumatosis or large bowel obstruction.  Previously demonstrated   stool

## 2019-08-18 NOTE — PROGRESS NOTES
THE [facility] [place] Progress Note    Dwayne Rodriguez is a 62 y.o. female patient. Hospitalization Day:7      Chief consult reason:   Colonic obstruction    Subjective:  No significant  Change overnight  Pt seen and examined. Pt continues to be non-verbal during assessment. Pt had repeat colonoscopy 8/15/2019 for decompression. Abdomen is much softer today but pt still has facial grimacing while touching lower abdomen area. NGT continues with large bilious out put  Issa placed due to retention    VITALS:  /72   Pulse 69   Temp 98 °F (36.7 °C) (Oral)   Resp 15   Ht 5' 5\" (1.651 m)   Wt 187 lb 6.3 oz (85 kg)   SpO2 93%   BMI 31.18 kg/m²   TEMPERATURE:  Current - Temp: 98 °F (36.7 °C); Max - Temp  Av.1 °F (36.7 °C)  Min: 97.6 °F (36.4 °C)  Max: 98.7 °F (37.1 °C)    Physical Assessment:  General appearance:  Sleeping, non-verbal  Mental Status: DEON  Lungs:  clear to auscultation bilaterally, normal effort  Heart:  regular rate and rhythm, no murmur  Abdomen:  soft, slightly tender, nondistended, hypoactive bowel sounds, no masses, hepatomegaly, splenomegaly  Extremities:  no edema, redness, tenderness in the calves  Skin:  no gross lesions, rashes, induration    Data Review:  LABS and IMAGING:     CBC  Recent Labs     19  0643 19  0831 19  0651   WBC 15.2* 10.6 8.9   HGB 14.5 13.1 12.9   MCV 92.1 90.3 86.1   RDW 12.3 12.3 12.3    243 225       ANEMIA STUDIES  No results for input(s): LABIRON, TIBC, FERRITIN, NPOHECOK18, FOLATE, OCCULTBLD in the last 72 hours. BMP  Recent Labs     19  0643 19  0831 19  0651   * 144 140   K 3.2* 3.7 3.4*    101 97*   CO2 31 33* 33*   BUN 16 15 14   CREATININE 0.68 0.73 0.67   GLUCOSE 182* 156* 165*   CALCIUM 8.7 8.7 8.7       LFTS  No results for input(s): ALKPHOS, ALT, AST, BILITOT, BILIDIR, LABALBU in the last 72 hours.     [facility]  Recent Labs     19  1733 19  2903

## 2019-08-19 ENCOUNTER — APPOINTMENT (OUTPATIENT)
Dept: INTERVENTIONAL RADIOLOGY/VASCULAR | Age: 59
DRG: 388 | End: 2019-08-19
Attending: INTERNAL MEDICINE
Payer: MEDICARE

## 2019-08-19 ENCOUNTER — APPOINTMENT (OUTPATIENT)
Dept: GENERAL RADIOLOGY | Age: 59
DRG: 388 | End: 2019-08-19
Attending: INTERNAL MEDICINE
Payer: MEDICARE

## 2019-08-19 LAB
ALBUMIN SERPL-MCNC: 3.1 G/DL (ref 3.5–5.2)
ALBUMIN/GLOBULIN RATIO: 1.1 (ref 1–2.5)
ALP BLD-CCNC: 100 U/L (ref 35–104)
ALT SERPL-CCNC: 109 U/L (ref 5–33)
AMMONIA: 23 UMOL/L (ref 11–51)
ANION GAP SERPL CALCULATED.3IONS-SCNC: 12 MMOL/L (ref 9–17)
AST SERPL-CCNC: 43 U/L
BILIRUB SERPL-MCNC: 0.36 MG/DL (ref 0.3–1.2)
BUN BLDV-MCNC: 14 MG/DL (ref 6–20)
BUN/CREAT BLD: ABNORMAL (ref 9–20)
CALCIUM SERPL-MCNC: 8.7 MG/DL (ref 8.6–10.4)
CHLORIDE BLD-SCNC: 100 MMOL/L (ref 98–107)
CO2: 27 MMOL/L (ref 20–31)
CREAT SERPL-MCNC: 0.72 MG/DL (ref 0.5–0.9)
CULTURE: NO GROWTH
GFR AFRICAN AMERICAN: >60 ML/MIN
GFR NON-AFRICAN AMERICAN: >60 ML/MIN
GFR SERPL CREATININE-BSD FRML MDRD: ABNORMAL ML/MIN/{1.73_M2}
GFR SERPL CREATININE-BSD FRML MDRD: ABNORMAL ML/MIN/{1.73_M2}
GLUCOSE BLD-MCNC: 145 MG/DL (ref 65–105)
GLUCOSE BLD-MCNC: 147 MG/DL (ref 65–105)
GLUCOSE BLD-MCNC: 149 MG/DL (ref 65–105)
GLUCOSE BLD-MCNC: 169 MG/DL (ref 65–105)
GLUCOSE BLD-MCNC: 171 MG/DL (ref 70–99)
HCT VFR BLD CALC: 38.9 % (ref 36.3–47.1)
HEMOGLOBIN: 12.8 G/DL (ref 11.9–15.1)
Lab: NORMAL
MAGNESIUM: 1.9 MG/DL (ref 1.6–2.6)
MCH RBC QN AUTO: 29.2 PG (ref 25.2–33.5)
MCHC RBC AUTO-ENTMCNC: 32.9 G/DL (ref 28.4–34.8)
MCV RBC AUTO: 88.6 FL (ref 82.6–102.9)
NRBC AUTOMATED: 0 PER 100 WBC
PDW BLD-RTO: 12.2 % (ref 11.8–14.4)
PHOSPHORUS: 3.8 MG/DL (ref 2.6–4.5)
PLATELET # BLD: 218 K/UL (ref 138–453)
PMV BLD AUTO: 10.2 FL (ref 8.1–13.5)
POTASSIUM SERPL-SCNC: 4 MMOL/L (ref 3.7–5.3)
RBC # BLD: 4.39 M/UL (ref 3.95–5.11)
SODIUM BLD-SCNC: 139 MMOL/L (ref 135–144)
SPECIMEN DESCRIPTION: NORMAL
TOTAL PROTEIN: 6 G/DL (ref 6.4–8.3)
TRIGL SERPL-MCNC: 298 MG/DL
WBC # BLD: 8.4 K/UL (ref 3.5–11.3)

## 2019-08-19 PROCEDURE — 85027 COMPLETE CBC AUTOMATED: CPT

## 2019-08-19 PROCEDURE — 99232 SBSQ HOSP IP/OBS MODERATE 35: CPT | Performed by: INTERNAL MEDICINE

## 2019-08-19 PROCEDURE — 6370000000 HC RX 637 (ALT 250 FOR IP): Performed by: NURSE PRACTITIONER

## 2019-08-19 PROCEDURE — 2580000003 HC RX 258: Performed by: INTERNAL MEDICINE

## 2019-08-19 PROCEDURE — 82947 ASSAY GLUCOSE BLOOD QUANT: CPT

## 2019-08-19 PROCEDURE — 2500000003 HC RX 250 WO HCPCS: Performed by: INTERNAL MEDICINE

## 2019-08-19 PROCEDURE — 51798 US URINE CAPACITY MEASURE: CPT

## 2019-08-19 PROCEDURE — 84100 ASSAY OF PHOSPHORUS: CPT

## 2019-08-19 PROCEDURE — 6360000002 HC RX W HCPCS: Performed by: INTERNAL MEDICINE

## 2019-08-19 PROCEDURE — 2709999900 HC NON-CHARGEABLE SUPPLY

## 2019-08-19 PROCEDURE — 6370000000 HC RX 637 (ALT 250 FOR IP): Performed by: STUDENT IN AN ORGANIZED HEALTH CARE EDUCATION/TRAINING PROGRAM

## 2019-08-19 PROCEDURE — 84478 ASSAY OF TRIGLYCERIDES: CPT

## 2019-08-19 PROCEDURE — 74018 RADEX ABDOMEN 1 VIEW: CPT

## 2019-08-19 PROCEDURE — 99233 SBSQ HOSP IP/OBS HIGH 50: CPT | Performed by: NURSE PRACTITIONER

## 2019-08-19 PROCEDURE — 82140 ASSAY OF AMMONIA: CPT

## 2019-08-19 PROCEDURE — 83735 ASSAY OF MAGNESIUM: CPT

## 2019-08-19 PROCEDURE — 2060000000 HC ICU INTERMEDIATE R&B

## 2019-08-19 PROCEDURE — 80053 COMPREHEN METABOLIC PANEL: CPT

## 2019-08-19 PROCEDURE — 36415 COLL VENOUS BLD VENIPUNCTURE: CPT

## 2019-08-19 PROCEDURE — 6370000000 HC RX 637 (ALT 250 FOR IP): Performed by: INTERNAL MEDICINE

## 2019-08-19 PROCEDURE — 51701 INSERT BLADDER CATHETER: CPT

## 2019-08-19 PROCEDURE — APPNB45 APP NON BILLABLE 31-45 MINUTES: Performed by: INTERNAL MEDICINE

## 2019-08-19 RX ADMIN — CALCIUM GLUCONATE: 98 INJECTION, SOLUTION INTRAVENOUS at 18:07

## 2019-08-19 RX ADMIN — SENNOSIDES 17.2 MG: 8.6 TABLET, FILM COATED ORAL at 19:47

## 2019-08-19 RX ADMIN — WATER 2.1 ML: 1 INJECTION INTRAMUSCULAR; INTRAVENOUS; SUBCUTANEOUS at 18:21

## 2019-08-19 RX ADMIN — METOPROLOL TARTRATE 5 MG: 1 INJECTION, SOLUTION INTRAVENOUS at 14:40

## 2019-08-19 RX ADMIN — I.V. FAT EMULSION 100 ML: 20 EMULSION INTRAVENOUS at 18:07

## 2019-08-19 RX ADMIN — METOPROLOL TARTRATE 5 MG: 1 INJECTION, SOLUTION INTRAVENOUS at 02:36

## 2019-08-19 RX ADMIN — LEVOFLOXACIN 500 MG: 5 INJECTION, SOLUTION INTRAVENOUS at 13:32

## 2019-08-19 RX ADMIN — OLANZAPINE 5 MG: 10 INJECTION, POWDER, LYOPHILIZED, FOR SOLUTION INTRAMUSCULAR at 18:07

## 2019-08-19 RX ADMIN — SODIUM CHLORIDE: 9 INJECTION, SOLUTION INTRAVENOUS at 11:10

## 2019-08-19 RX ADMIN — SENNOSIDES 17.2 MG: 8.6 TABLET, FILM COATED ORAL at 09:24

## 2019-08-19 RX ADMIN — METOPROLOL TARTRATE 5 MG: 1 INJECTION, SOLUTION INTRAVENOUS at 19:46

## 2019-08-19 RX ADMIN — POLYETHYLENE GLYCOL 3350, SODIUM SULFATE ANHYDROUS, SODIUM BICARBONATE, SODIUM CHLORIDE, POTASSIUM CHLORIDE 1000 ML: 236; 22.74; 6.74; 5.86; 2.97 POWDER, FOR SOLUTION ORAL at 17:09

## 2019-08-19 RX ADMIN — INSULIN LISPRO 2 UNITS: 100 INJECTION, SOLUTION INTRAVENOUS; SUBCUTANEOUS at 02:36

## 2019-08-19 RX ADMIN — METOPROLOL TARTRATE 5 MG: 1 INJECTION, SOLUTION INTRAVENOUS at 08:00

## 2019-08-19 RX ADMIN — INSULIN LISPRO 2 UNITS: 100 INJECTION, SOLUTION INTRAVENOUS; SUBCUTANEOUS at 08:00

## 2019-08-19 RX ADMIN — INSULIN LISPRO 2 UNITS: 100 INJECTION, SOLUTION INTRAVENOUS; SUBCUTANEOUS at 19:47

## 2019-08-19 RX ADMIN — SODIUM CHLORIDE, PRESERVATIVE FREE 10 ML: 5 INJECTION INTRAVENOUS at 09:24

## 2019-08-19 ASSESSMENT — PAIN SCALES - WONG BAKER
WONGBAKER_NUMERICALRESPONSE: 0
WONGBAKER_NUMERICALRESPONSE: 0

## 2019-08-19 ASSESSMENT — PAIN SCALES - GENERAL
PAINLEVEL_OUTOF10: 0
PAINLEVEL_OUTOF10: 0

## 2019-08-19 NOTE — CONSULTS
General Surgery:    Consult Note        PATIENT NAME: Cammy Rick   YOB: 1960    ADMISSION DATE: 8/11/2019  2:33 PM     Admitting Provider: Dr. Ramos Sage Physician: Dr. Mer Kennedy DATE: 8/19/2019    Chief Complaint:  AMS    Consult Regarding:  Colonic pseduo-obstruction     HISTORY OF PRESENT ILLNESS:  The patient is a 62 y.o. female  Who was admitted on 8/11/19 as transfer from OSH from [address] [place] [address] and colonic pseudo-obstruction. Significant h/o schizophrenis on multiple medications (risperdal, cogentin, trazodone and zyprexa, all been on hold minus zyprexa). Since txf pt given neostigmine w/out improvement and required X 2 endoscopic colonic decompression (8/12/19 and 8/16/19), in between the two decompressions had a sbft that was negative w/ a couple bm after and none since. She is currently being worked up by Neurology with MRI brain and LP. Pt w/ surgical h/o cholecystectomy, umbilical hernia repair, tubal ligation.      Past Medical History:        Diagnosis Date    Depression     Diabetes mellitus ([facility] [address]     Thyroid disease     Hypothyroid       Past Surgical History:        Procedure Laterality Date    ABDOMEN SURGERY      CHOLECYSTECTOMY      COLONOSCOPY  8/12/2019    COLONOSCOPY FLEXIBLE W/ DECOMPRESSION performed by Cruz Piña MD at [facility]    COLONOSCOPY N/A 8/16/2019    COLONOSCOPY FLEXIBLE W/ DECOMPRESSION performed by Ciro Issa MD at [address] [place] [address]         Medications Prior to Admission:   Medications Prior to Admission: propranolol (INDERAL) 1 MG/ML injection, Infuse 1 mg intravenously once  benztropine (COGENTIN) 0.5 MG tablet, Take 0.5 mg by mouth daily  melatonin 3 MG TABS tablet, Take 5 mg by mouth nightly as needed  risperiDONE (RISPERDAL) 1 MG tablet, Take 1 mg by mouth daily  OLANZapine (ZYPREXA) 5 MG tablet, Take 5 mg by mouth nightly  metFORMIN (GLUCOPHAGE) 1000 MG tablet, Take 1,000 mg by mouth 2 times daily (with meals)  Insulin Glargine-Lixisenatide (SOLIQUA SC), Inject 50 Units into the skin daily  atorvastatin (LIPITOR) 20 MG tablet, Take 20 mg by mouth daily  traZODone (DESYREL) 100 MG tablet, Take 100 mg by mouth nightly  diphenhydrAMINE (BENADRYL) 25 MG capsule, Take 2 capsules by mouth every 6 hours as needed for Itching    Allergies:  Bicillin [penicillin g benzathine] and Geodon [ziprasidone]    Social History:   Social History     Socioeconomic History    Marital status:      Spouse name: Not on file    Number of children: 3    Years of education: Not on file    Highest education level: Not on file   Occupational History    Not on file   Social Needs    Financial resource strain: Not on file    Food insecurity:     Worry: Not on file     Inability: Not on file    Transportation needs:     Medical: Not on file     Non-medical: Not on file   Tobacco Use    Smoking status: Former Smoker     Packs/day: 1.00     Years: 40.00     Pack years: 40.00     Types: Cigarettes     Last attempt to quit: 3/6/2019     Years since quittin.4    Smokeless tobacco: Never Used    Tobacco comment: HARRIET Dubois RRT 19   Substance and Sexual Activity    Alcohol use: No    Drug use: No    Sexual activity: Not Currently   Lifestyle    Physical activity:     Days per week: Not on file     Minutes per session: Not on file    Stress: Not on file   Relationships    Social connections:     Talks on phone: Not on file     Gets together: Not on file     Attends Mosque service: Not on file     Active member of club or organization: Not on file     Attends meetings of clubs or organizations: Not on file     Relationship status: Not on file    Intimate partner violence:     Fear of current or ex partner: Not on file     Emotionally abused: Not on file     Physically abused: Not on file     Forced sexual activity: Not on file   Other Topics Concern    Not on file   Social History Narrative    8/11/2019 12:12 pm COMPARISON: 10 August 2019 HISTORY: ORDERING SYSTEM PROVIDED HISTORY: monitor colonic distention. TECHNOLOGIST PROVIDED HISTORY: monitor colonic distention. Reason for Exam: Patient unresponsive to questions. Films done portable. Acuity: Acute Type of Exam: Subsequent/Follow-up FINDINGS: Intestinal tube terminates in the distal stomach. Surgical clips are present in the gallbladder fossa. Interval decrease in bowel diameter is noted. Distal transverse colon now measures 5.9 cm in diameter previously 7.4. No pneumatosis is evident. Retention sutures are present right upper quadrant. No free air is seen. Slight reduction in left colonic diameter. The cecum remains fairly distended. No pneumatosis or free air. 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 62 y.o. female w/ significant psyciatric h/o presents with colonic pseudo obstruction (olgives syndrome). S/p neostigmine and colonic decompression X2. Neg sbft. Plan:  1. Continue medical mgmt and supportive care per primary  2. Serial abd exams  3. Daily kub  4. Give 1L Golytely  5. Monitor bowel function  6. Diet - NPO rec TPN, if pt has bm after go lytely would rec starting TF at 25cc/hr along with reglan 10mg q6hr  7. No acute surgical intervention at this time. Electronically signed by Gisselle Salcido DO  on 8/19/2019 at 7:22 PM   I Dr. Natalia Lopez saw and examined the patient. I have edited the above and agree with the above. Erendira George  Colorectal

## 2019-08-19 NOTE — PROGRESS NOTES
WBC 10.6 8.9   HGB 13.1 12.9   HCT 40.2 39.6   MCV 90.3 86.1   MCH 29.4 28.0   MCHC 32.6 32.6    225       BMP  Recent Labs     08/17/19  0831 08/18/19  0651 08/18/19  1621    140  --    K 3.7 3.4* 3.8    97*  --    CO2 33* 33*  --    BUN 15 14  --    CREATININE 0.73 0.67  --    GLUCOSE 156* 165*  --    CALCIUM 8.7 8.7  --        LFTS  No results for input(s): ALKPHOS, ALT, AST, PROT, BILITOT, BILIDIR, LABALBU in the last 72 hours. AMYLASE/LIPASE/AMMONIA  Recent Labs     08/16/19  1739 08/17/19  0831 08/18/19  0651   AMMONIA 34 52* 43       PT/INR  No results for input(s): PROTIME, INR in the last 72 hours. ANEMIA STUDIES  No results for input(s): LABIRON, TIBC, FERRITIN, QXODCCER84, FOLATE, OCCULTBLD in the last 72 hours. IMAGING  Ct Abdomen Pelvis Wo Contrast Additional Contrast? None    Result Date: 8/10/2019  EXAMINATION: CT OF THE ABDOMEN AND PELVIS WITHOUT CONTRAST 8/10/2019 12:15 pm TECHNIQUE: CT of the abdomen and pelvis was performed without the administration of intravenous contrast. Multiplanar reformatted images are provided for review. Dose modulation, iterative reconstruction, and/or weight based adjustment of the mA/kV was utilized to reduce the radiation dose to as low as reasonably achievable. COMPARISON: Acute abdominal series performed earlier today. HISTORY: ORDERING SYSTEM PROVIDED HISTORY: ABDOMINAL PAIN TECHNOLOGIST PROVIDED HISTORY: Reason for Exam: Abdomen distention. Patient unable to communicate medical history. Follow up abnormal x-ray. Acuity: Acute Type of Exam: Initial FINDINGS: Lower Chest: No focal consolidation. No pericardial or pleural effusions. Organs: Suboptimal evaluation due to lack of IV contrast.  Diffuse hepatic steatosis. Gallbladder has been removed. Pancreas is atrophic. Spleen and adrenal glands appear unremarkable. Kidneys demonstrate no evidence of stone or hydronephrosis.   Abdominal aorta demonstrates mild calcification without Both kidneys are functional and there is no hydronephrosis. GI/Bowel: A nasogastric tube terminates in the distal stomach. There is oral contrast throughout the colon and rectum, which is distended with air. The cecum measures up to 12 cm in craniocaudal dimension, measuring 10 cm 5 days prior. There is no evidence of small-bowel obstruction. Pelvis: The urinary bladder is nondistended. No pelvic mass or fluid collection. Peritoneum/Retroperitoneum: The abdominal aorta is normal in course and caliber. No retroperitoneal lymphadenopathy. Bones/Soft Tissues: No suspicious osteolytic or osteoblastic lesions are demonstrated. 1. The colon is diffusely distended with air, which is slightly increased from 5 days prior. The cecum now measures up to 12 cm in size. 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 No evidence of pneumatosis or large bowel obstruction. Previously demonstrated stool within the colon has been passed. 2. No evidence of small-bowel obstruction. The oral contrast from yesterday's small-bowel follow-through is now primarily within the colon and rectum. Fl Small Bowel Follow Through Only    Result Date: 8/15/2019  EXAMINATION: SMALL BOWEL FOLLOW THROUGH SERIES 8/15/2019 TECHNIQUE: Small bowel follow through series was performed with overhead images and spot images. FLUOROSCOPY DOSE AND TYPE OR TIME AND EXPOSURES: 0 COMPARISON: Abdomen radiograph dated 08/15/2019 HISTORY: ORDERING SYSTEM PROVIDED HISTORY: obstruction? TECHNOLOGIST PROVIDED HISTORY: obstruction? FINDINGS:  image of the abdomen demonstrates a mild gaseous distention of the colon similar to prior radiograph. There is normal transit time to the terminal ileum. Contrast is present in the duodenum and jejunum at 0 minutes. Contrast opacifies jejunal and ileal loops at 15 minutes. Contrast is present in the colon at 1 hour.   No focal abnormalities, strictures or obstructions are seen colon.         RECOMMENDATIONS:   1) KUB. 2) Bowel regimen  3) Monitor.        Anton Platt MD [facility]    Assessment:     1. Colonic pseudo-obstruction   - S/P colonic decompression 08/12/2019,  08/16/2019.   - No BM since 08/16/2019. Abdomen remains mildly distended and patient appears to tense abdomen with palpation. N/G output 700 over past 24 hours. 2. AMS -unclear etiology, neurology following    Plan:     1. Patient needs bowel regimen. Currently on Senna tab. May need lower GI stimulation, including tapwater enema - Follow-up on KUB  2. GS considering loop colostomy if no improvement  3. MRI brain and LP planned per Neuro   4. Clamp N/G and assess aspirate every 4 hours. Resume to LIWS if residual gastric content> 300 ml  5. Please call GI with any questions. Concerns or acute change in clinical status       This plan was formulated in collaboration with Dr. Juan Diego Cesar     Electronically signed by Ludwig Byrd CNP on 8/19/2019 at 7:01 AM    Please note that this note was generated using a voice recognition dictation software. Although every effort was made to ensure the accuracy of this automated transcription, some errors in transcription may have occurred.

## 2019-08-19 NOTE — PROGRESS NOTES
Occupational Therapy Not Seen Note    DATE: 2019  Name: Kimmy Pena  : 1960  MRN: [id number]    Patient not available for Occupational Therapy due to:     Other: pt not following commands or able to actively participate in therapy at this time     Next Scheduled Treatment: check back 19    Electronically signed by SARY Carter on 2019 at 9:05 AM

## 2019-08-19 NOTE — PROGRESS NOTES
Neurology Nurse Practitioner Progress Note      INTERVAL HISTORY: This is a 62 y.o.  female admitted 8/11/2019 for Altered mental status [R41.82]. This is a follow-up neurology progress note. The patient was examined and the chart was reviewed. Discussed with the RN. There were no acute events overnight. HPI: Leyda Becker is a 62 y.o. female with H/O DM, schizophrenia, who was admitted as a transfer from [facility] ED on 8/11/2019 for Altered mental status [R41.82]. According to the medical records, pt was found down in her house, confused & less responsive. She was found to have pneumonia & was started on ATB. There were some concerns about intestinal obstruction; got transferred to [facility] for further evaluation. She was found to have colonic pseudo-obstruction & underwent colonic decompressions on 8/12 & later on 8/16/19. Neurology was consulted due to on-going AMS.       senna  2 tablet Oral BID    levofloxacin  500 mg Intravenous Q24H    OLANZapine  5 mg Intramuscular QPM    And    sterile water  2.1 mL Intramuscular QPM    metoprolol  5 mg Intravenous Q6H    [Held by provider] traZODone  100 mg Oral Nightly    [Held by provider] atorvastatin  20 mg Oral Daily    sodium chloride flush  10 mL Intravenous 2 times per day    [Held by provider] enoxaparin  40 mg Subcutaneous Daily    insulin lispro  0-12 Units Subcutaneous Q6H    [Held by provider] Insulin Glargine-Lixisenatide  50 Units Subcutaneous Daily       Past Medical History:   Diagnosis Date    Depression     Diabetes mellitus ([facility] [address]     Thyroid disease     Hypothyroid       Past Surgical History:   Procedure Laterality Date    ABDOMEN SURGERY      CHOLECYSTECTOMY      COLONOSCOPY  8/12/2019    COLONOSCOPY FLEXIBLE W/ DECOMPRESSION performed by Adali Estrada MD at [facility]    COLONOSCOPY N/A 8/16/2019    COLONOSCOPY FLEXIBLE W/ DECOMPRESSION performed by Janine Johnson MD at [address] [place] [address] depression, hypothyroidism     Will follow

## 2019-08-19 NOTE — PROGRESS NOTES
Writer in bathing pt and pt states \"I have a hair in my mouth\" Writer asks pt if she knows where she is or what year it is and pt states \"no\" Requested pt to open eyes and pt follows command. Ask pt if she has a sister and what her name is and pt says her pt sister name is David Vuong and her kids names are sadi, shanita and renzo. Pt starts to cry and states she is afraid and then pt will no longer respond to pt.  1745 Pt opens eyes and says she can't talk because her throat hurts as nurse is calling her name and asking her to open her eyes and talk to me. When asked pt states she can hear the nurses and doctors talking in the room to her and about her but she doesn't fully understand Writer requests that she start talking to us so we can figure out what is going on with her and she doesn't respond any further.

## 2019-08-19 NOTE — PROGRESS NOTES
Nutrition Assessment (Parenteral Nutrition)    Type and Reason for Visit: Reassess    Nutrition Recommendations:   - Continue Parenteral Nutrition - Suggest increasing rate to 65 mL/hr, increase Dextrose to 150 gm, and increase AA to 65 gm. Suggest decreasing IV lipids to 100 mL. Will provide 970 kcal and 65 gm protein per day. - Monitor labs and modify PPN as needed. Monitor for PICC placement and switch to TPN as able. - Continue NPO. Will monitor bowel function and plan of care. Nutrition Assessment: Pt improving nutritionally as evidenced by start of PPN yesterday. PPN to continue and pt remains NPO. RN unsure of plans for PICC placement. Noted last BM on 8/16 when 2nd decompressive colonoscopy was completed. NG remains in place. Labs reviewed - triglycerides of 298 noted. Discussed with RN. Malnutrition Assessment:  · Malnutrition Status: At risk for malnutrition  · Context: Acute illness or injury  · Findings of the 6 clinical characteristics of malnutrition (Minimum of 2 out of 6 clinical characteristics is required to make the diagnosis of moderate or severe Protein Calorie Malnutrition based on AND/ASPEN Guidelines):  1. Energy Intake-Less than or equal to 50% of estimated energy requirement, Greater than or equal to 7 days    2. Weight Loss-Unable to assess, unable to assess  3. Fat Loss-No significant subcutaneous fat loss  4. Muscle Loss-No significant muscle mass loss  5.  Fluid Accumulation-No significant fluid accumulation    Nutrition Risk Level: High    Nutrient Needs:  · Estimated Daily Total Kcal: 3179-9665 kcal/day  · Estimated Daily Protein (g):  gm pro/day    Nutrition Diagnosis:   · Problem: Inadequate oral intake  · Etiology: related to Alteration in GI function(Colonic pseudo-obstruction)     Signs and symptoms:  as evidenced by NPO status due to medical condition, Nutrition support - PN    Objective Information:  · Nutrition-Focused Physical Findings: NG - 700

## 2019-08-19 NOTE — PROGRESS NOTES
Q24H    OLANZapine  5 mg Intramuscular QPM    And    sterile water  2.1 mL Intramuscular QPM    metoprolol  5 mg Intravenous Q6H    [Held by provider] traZODone  100 mg Oral Nightly    [Held by provider] atorvastatin  20 mg Oral Daily    sodium chloride flush  10 mL Intravenous 2 times per day    [Held by provider] enoxaparin  40 mg Subcutaneous Daily    insulin lispro  0-12 Units Subcutaneous Q6H    [Held by provider] Insulin Glargine-Lixisenatide  50 Units Subcutaneous Daily     Continuous Infusions:    PN-Adult 2-in-1 Peripheral Line (Standard) 50 mL/hr at 19 1755    sodium chloride 50 mL/hr at 19 1110    dextrose       PRN Meds: promethazine, hydrALAZINE, diphenhydrAMINE, glucose, dextrose, glucagon (rDNA), dextrose, sodium chloride flush, [DISCONTINUED] potassium chloride **OR** [DISCONTINUED] potassium alternative oral replacement **OR** potassium chloride, magnesium sulfate, magnesium hydroxide, ondansetron, nicotine, acetaminophen, atropine    Data:     Past Medical History:   has a past medical history of Depression, Diabetes mellitus ([address] and Thyroid disease. Social History:   reports that she quit smoking about 5 months ago. Her smoking use included cigarettes. She has a 40.00 pack-year smoking history. She has never used smokeless tobacco. She reports that she does not drink alcohol or use drugs. Family History:   Family History   Problem Relation Age of Onset    Diabetes Mother     Heart Disease Mother     Stroke Brother        Vitals:  /83   Pulse 73   Temp 98.5 °F (36.9 °C) (Axillary)   Resp 15   Ht 5' 5\" (1.651 m)   Wt 188 lb 4.4 oz (85.4 kg)   SpO2 92%   BMI 31.33 kg/m²   Temp (24hrs), Av.5 °F (36.9 °C), Min:98.1 °F (36.7 °C), Max:99.1 °F (37.3 °C)    Recent Labs     19  1228 19  1957 19  0203 19  0703   POCGLU 137* 140* 149* 145*       I/O (24Hr):     Intake/Output Summary (Last 24 hours) at 2019 1320  Last data filed

## 2019-08-19 NOTE — PROGRESS NOTES
is present in   the duodenum and jejunum at 0 minutes.  Contrast opacifies jejunal and ileal   loops at 15 minutes.  Contrast is present in the colon at 1 hour.  No focal   abnormalities, strictures or obstructions are seen of the small bowel.      Impression   Unremarkable small bowel follow through series.          XR ABDOMEN (KUB) (08/15/19): Impression   1.  Unchanged mild gaseous prominence of the colon with gas present in the   rectum.  No small bowel dilatation identified.       2.  Enteric tube remains in the stomach.      XR ABDOMEN KUB (08/14/19):      FINDINGS:   The enteric tube has its tip in the distal gastric body.  Staples are noted. Air is noted in the colon.  The degree of distention has improved since the   prior exam.      IMPRESSION:  The enteric tube has its tip in the distal gastric body, not significantly   changed.       No significant colonic distention. 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 58 y. o. 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 S/p Total colonoscopy with decompression (DOS: 8/16/19)    Plan:  1. Diet: NPO  2. Analgesia: IV acetamineophen 650mg q4h   3. GI prophylaxis: Odansetron 4mg IV q6h prn  4. DVT prophylaxis: SCD pumps, Lovenox 40mg subcutaneous QD  5. 08/18 KUB showed improved colonic and cecal distention. Had appropriate BM 8/15/19 with enema and none since then. GI colon decompression 8/16/19.    6. NGT with 700 / 24h dark brown output (2180 NG output 08/17)  7. Started PPN nutrition today per recommendation of GI and nutrition (08/16). 8. KUB this am pending. 9. Continue serial abdominal exams. 10. No acute surgical intervention planned at this time. Electronically signed by Celeste Jiménez  on 8/19/2019 at 6:05 AM     Pt remains non-verbal not following commands. Exam remains stable, hard to discriminate abdominal pain as light palpation to sternum elicits same response at this time. Abd: soft, distended, appears to have some discomfort to palpation but not peritoneal.     Will get KUB this am. If bowel remains non-distended would rec bowel regimen daily dulcolax suppository and possible soap suds enema. If no improvement may need to consider loop colostomy as pt has had X2 decompressive colonoscopy's. Will await further work up per neurology for MRI and LP. Will continue to monitor at this time. Rickey Knapp PGY4    I have reviewed the resident's note and I either performed the key elements of the medical history and physical exam or was present with the resident when the key elements of the medical history and physical exam were performed. I have discussed the findings, established the care plan and recommendations with Resident.     Electronically signed by Stephanie Garcia IV, DO on 8/21/19 at 9:03 AM

## 2019-08-19 NOTE — CONSULTS
dextrose, glucagon (rDNA), dextrose, sodium chloride flush, [DISCONTINUED] potassium chloride **OR** [DISCONTINUED] potassium alternative oral replacement **OR** potassium chloride, magnesium sulfate, magnesium hydroxide, ondansetron, nicotine, acetaminophen, atropine    Allergies:  Bicillin [penicillin g benzathine] and Geodon [ziprasidone]    Social History:    Social History     Socioeconomic History    Marital status:      Spouse name: Not on file    Number of children: 3    Years of education: Not on file    Highest education level: Not on file   Occupational History    Not on file   Social Needs    Financial resource strain: Not on file    Food insecurity:     Worry: Not on file     Inability: Not on file    Transportation needs:     Medical: Not on file     Non-medical: Not on file   Tobacco Use    Smoking status: Former Smoker     Packs/day: 1.00     Years: 40.00     Pack years: 40.00     Types: Cigarettes     Last attempt to quit: 3/6/2019     Years since quittin.4    Smokeless tobacco: Never Used    Tobacco comment: Ezio De La Torre RRT 19   Substance and Sexual Activity    Alcohol use: No    Drug use: No    Sexual activity: Not Currently   Lifestyle    Physical activity:     Days per week: Not on file     Minutes per session: Not on file    Stress: Not on file   Relationships    Social connections:     Talks on phone: Not on file     Gets together: Not on file     Attends [employer] service: Not on file     Active member of club or organization: Not on file     Attends meetings of clubs or organizations: Not on file     Relationship status: Not on file    Intimate partner violence:     Fear of current or ex partner: Not on file     Emotionally abused: Not on file     Physically abused: Not on file     Forced sexual activity: Not on file   Other Topics Concern    Not on file   Social History Narrative    Not on file         Family History:    Family History   Problem Relation Age contrast reviewed (8/10/19): No hydronephrosis. Colonic distention. Assessment and Plan   Impression:      The patient is a 62 y.o. female who presents with      Problem List  - Urinary retention, Issa placed for 600cc    Plan:     - Remove Issa. After patient voids, check a post void residual. Record voided and PVR amount. Perform clean intermittent catheterization (CIC) for PVR > 450 mL  - UCx NG (8/18/19)  - Cr 0.72  - CT abdomen and pelvis without contrast reviewed (8/10/19): No hydronephrosis. Colonic distention. Bradford Stroud  1:04 PM 8/19/2019     I have reviewed the history above and agree. I have repeated the key portions of the physical exam and concur with the resident's findings. I have reviewed all laboratory findings and imaging reports/films. I agree with the plan as noted above.     Electronically signed by Anirudh Early MD on 8/20/2019 at 11:55 AM

## 2019-08-20 ENCOUNTER — APPOINTMENT (OUTPATIENT)
Dept: GENERAL RADIOLOGY | Age: 59
DRG: 388 | End: 2019-08-20
Attending: INTERNAL MEDICINE
Payer: MEDICARE

## 2019-08-20 ENCOUNTER — APPOINTMENT (OUTPATIENT)
Dept: INTERVENTIONAL RADIOLOGY/VASCULAR | Age: 59
DRG: 388 | End: 2019-08-20
Attending: INTERNAL MEDICINE
Payer: MEDICARE

## 2019-08-20 ENCOUNTER — APPOINTMENT (OUTPATIENT)
Dept: MRI IMAGING | Age: 59
DRG: 388 | End: 2019-08-20
Attending: INTERNAL MEDICINE
Payer: MEDICARE

## 2019-08-20 LAB
ALLEN TEST: POSITIVE
AMMONIA: 33 UMOL/L (ref 11–51)
ANION GAP SERPL CALCULATED.3IONS-SCNC: 13 MMOL/L (ref 9–17)
ANION GAP SERPL CALCULATED.3IONS-SCNC: 14 MMOL/L (ref 9–17)
APPEARANCE CSF: CLEAR
BUN BLDV-MCNC: 12 MG/DL (ref 6–20)
BUN BLDV-MCNC: 13 MG/DL (ref 6–20)
BUN/CREAT BLD: ABNORMAL (ref 9–20)
BUN/CREAT BLD: ABNORMAL (ref 9–20)
CALCIUM SERPL-MCNC: 8.2 MG/DL (ref 8.6–10.4)
CALCIUM SERPL-MCNC: 8.4 MG/DL (ref 8.6–10.4)
CHLORIDE BLD-SCNC: 105 MMOL/L (ref 98–107)
CHLORIDE BLD-SCNC: 107 MMOL/L (ref 98–107)
CO2: 20 MMOL/L (ref 20–31)
CO2: 25 MMOL/L (ref 20–31)
CREAT SERPL-MCNC: 0.46 MG/DL (ref 0.5–0.9)
CREAT SERPL-MCNC: 0.57 MG/DL (ref 0.5–0.9)
CRYPTOCOCCUS NEOFORMANS/GATTI CSF FILM ARR.: NOT DETECTED
CYTOMEGALOVIRUS (CMV) CSF FILM ARRAY: NOT DETECTED
DIRECT EXAM: NORMAL
ENTEROVIRUS CSF FILM ARRAY: NOT DETECTED
ESCHERICHIA COLI K1 CSF FILM ARRAY: NOT DETECTED
FIO2: ABNORMAL
GFR AFRICAN AMERICAN: >60 ML/MIN
GFR AFRICAN AMERICAN: >60 ML/MIN
GFR NON-AFRICAN AMERICAN: >60 ML/MIN
GFR NON-AFRICAN AMERICAN: >60 ML/MIN
GFR SERPL CREATININE-BSD FRML MDRD: ABNORMAL ML/MIN/{1.73_M2}
GLUCOSE BLD-MCNC: 141 MG/DL (ref 65–105)
GLUCOSE BLD-MCNC: 145 MG/DL (ref 65–105)
GLUCOSE BLD-MCNC: 163 MG/DL (ref 65–105)
GLUCOSE BLD-MCNC: 178 MG/DL (ref 70–99)
GLUCOSE BLD-MCNC: 185 MG/DL (ref 70–99)
GLUCOSE BLD-MCNC: 207 MG/DL (ref 65–105)
GLUCOSE BLD-MCNC: 207 MG/DL (ref 74–100)
GLUCOSE, CSF: 94 MG/DL (ref 40–70)
HAEMOPHILUS INFLUENZA CSF FILM ARRAY: NOT DETECTED
HCT VFR BLD CALC: 42.5 % (ref 36.3–47.1)
HEMOGLOBIN: 13.6 G/DL (ref 11.9–15.1)
HHV-6 (HERPESVIRUS 6) CSF FILM ARRAY: NOT DETECTED
HSV-1 CSF FILM ARRAY: NOT DETECTED
HSV-2 CSF FILM ARRAY: NOT DETECTED
LISTERIA MONOCYTOGENES CSF FILM ARRAY: NOT DETECTED
Lab: NORMAL
MAGNESIUM: 2 MG/DL (ref 1.6–2.6)
MCH RBC QN AUTO: 28.9 PG (ref 25.2–33.5)
MCHC RBC AUTO-ENTMCNC: 32 G/DL (ref 28.4–34.8)
MCV RBC AUTO: 90.2 FL (ref 82.6–102.9)
MODE: ABNORMAL
NEGATIVE BASE EXCESS, ART: ABNORMAL (ref 0–2)
NEISSERIA MENIGITIDIS CSF FILM ARRAY: NOT DETECTED
NRBC AUTOMATED: 0 PER 100 WBC
O2 DEVICE/FLOW/%: ABNORMAL
PARECHOVIRUS CSF FILM ARRAY: NOT DETECTED
PATIENT TEMP: ABNORMAL
PDW BLD-RTO: 12.5 % (ref 11.8–14.4)
PHOSPHORUS: 3 MG/DL (ref 2.6–4.5)
PLATELET # BLD: 228 K/UL (ref 138–453)
PMV BLD AUTO: 10 FL (ref 8.1–13.5)
POC HCO3: 24.5 MMOL/L (ref 21–28)
POC LACTIC ACID: 0.37 MMOL/L (ref 0.56–1.39)
POC O2 SATURATION: 97 % (ref 94–98)
POC PCO2 TEMP: ABNORMAL MM HG
POC PCO2: 33 MM HG (ref 35–48)
POC PH TEMP: ABNORMAL
POC PH: 7.48 (ref 7.35–7.45)
POC PO2 TEMP: ABNORMAL MM HG
POC PO2: 83.9 MM HG (ref 83–108)
POSITIVE BASE EXCESS, ART: 2 (ref 0–3)
POTASSIUM SERPL-SCNC: 3.5 MMOL/L (ref 3.7–5.3)
POTASSIUM SERPL-SCNC: 3.9 MMOL/L (ref 3.7–5.3)
PROTEIN CSF: 51.8 MG/DL (ref 15–45)
RBC # BLD: 4.71 M/UL (ref 3.95–5.11)
RBC CSF: 46 /MM3
SAMPLE SITE: ABNORMAL
SODIUM BLD-SCNC: 139 MMOL/L (ref 135–144)
SODIUM BLD-SCNC: 145 MMOL/L (ref 135–144)
SPECIMEN DESCRIPTION: NORMAL
SPECIMEN DESCRIPTION: NORMAL
STREPTOCOCCUS AGALACTIAE CSF FILM ARRAY: NOT DETECTED
STREPTOCOCCUS PNEUMONIAE CSF FILM ARRAY: NOT DETECTED
SUPERNAT COLOR CSF: ABNORMAL
TCO2 (CALC), ART: 26 MMOL/L (ref 22–29)
TUBE NUMBER CSF: 3
VARICELLA-ZOSTER CSF FILM ARRAY: NOT DETECTED
VDRL CSF SCREEN: NONREACTIVE
VOLUME CSF: 8
WBC # BLD: 8.9 K/UL (ref 3.5–11.3)
WBC CSF: 4 /MM3
XANTHOCHROMIA: ABNORMAL

## 2019-08-20 PROCEDURE — 36415 COLL VENOUS BLD VENIPUNCTURE: CPT

## 2019-08-20 PROCEDURE — 2709999900 HC NON-CHARGEABLE SUPPLY

## 2019-08-20 PROCEDURE — 009U3ZX DRAINAGE OF SPINAL CANAL, PERCUTANEOUS APPROACH, DIAGNOSTIC: ICD-10-PCS | Performed by: RADIOLOGY

## 2019-08-20 PROCEDURE — 2060000000 HC ICU INTERMEDIATE R&B

## 2019-08-20 PROCEDURE — 6360000002 HC RX W HCPCS: Performed by: INTERNAL MEDICINE

## 2019-08-20 PROCEDURE — 83605 ASSAY OF LACTIC ACID: CPT

## 2019-08-20 PROCEDURE — 77003 FLUOROGUIDE FOR SPINE INJECT: CPT | Performed by: RADIOLOGY

## 2019-08-20 PROCEDURE — 87015 SPECIMEN INFECT AGNT CONCNTJ: CPT

## 2019-08-20 PROCEDURE — 2500000003 HC RX 250 WO HCPCS: Performed by: INTERNAL MEDICINE

## 2019-08-20 PROCEDURE — 36600 WITHDRAWAL OF ARTERIAL BLOOD: CPT

## 2019-08-20 PROCEDURE — 99232 SBSQ HOSP IP/OBS MODERATE 35: CPT | Performed by: INTERNAL MEDICINE

## 2019-08-20 PROCEDURE — 6370000000 HC RX 637 (ALT 250 FOR IP): Performed by: INTERNAL MEDICINE

## 2019-08-20 PROCEDURE — 80048 BASIC METABOLIC PNL TOTAL CA: CPT

## 2019-08-20 PROCEDURE — 51701 INSERT BLADDER CATHETER: CPT

## 2019-08-20 PROCEDURE — 2580000003 HC RX 258: Performed by: INTERNAL MEDICINE

## 2019-08-20 PROCEDURE — 82140 ASSAY OF AMMONIA: CPT

## 2019-08-20 PROCEDURE — 74018 RADEX ABDOMEN 1 VIEW: CPT

## 2019-08-20 PROCEDURE — 6370000000 HC RX 637 (ALT 250 FOR IP): Performed by: NURSE PRACTITIONER

## 2019-08-20 PROCEDURE — 84100 ASSAY OF PHOSPHORUS: CPT

## 2019-08-20 PROCEDURE — 51798 US URINE CAPACITY MEASURE: CPT

## 2019-08-20 PROCEDURE — 82803 BLOOD GASES ANY COMBINATION: CPT

## 2019-08-20 PROCEDURE — 83735 ASSAY OF MAGNESIUM: CPT

## 2019-08-20 PROCEDURE — 99232 SBSQ HOSP IP/OBS MODERATE 35: CPT | Performed by: NURSE PRACTITIONER

## 2019-08-20 PROCEDURE — 85027 COMPLETE CBC AUTOMATED: CPT

## 2019-08-20 PROCEDURE — 62270 DX LMBR SPI PNXR: CPT | Performed by: RADIOLOGY

## 2019-08-20 PROCEDURE — 89050 BODY FLUID CELL COUNT: CPT

## 2019-08-20 PROCEDURE — 70551 MRI BRAIN STEM W/O DYE: CPT

## 2019-08-20 PROCEDURE — 82947 ASSAY GLUCOSE BLOOD QUANT: CPT

## 2019-08-20 RX ORDER — POLYETHYLENE GLYCOL 3350 17 G/17G
17 POWDER, FOR SOLUTION ORAL DAILY
Status: DISCONTINUED | OUTPATIENT
Start: 2019-08-20 | End: 2019-08-25

## 2019-08-20 RX ORDER — POTASSIUM CHLORIDE 7.45 MG/ML
10 INJECTION INTRAVENOUS ONCE
Status: COMPLETED | OUTPATIENT
Start: 2019-08-20 | End: 2019-08-20

## 2019-08-20 RX ADMIN — I.V. FAT EMULSION 100 ML: 20 EMULSION INTRAVENOUS at 18:20

## 2019-08-20 RX ADMIN — POTASSIUM CHLORIDE 10 MEQ: 7.46 INJECTION, SOLUTION INTRAVENOUS at 15:02

## 2019-08-20 RX ADMIN — METOPROLOL TARTRATE 5 MG: 1 INJECTION, SOLUTION INTRAVENOUS at 09:06

## 2019-08-20 RX ADMIN — METOPROLOL TARTRATE 5 MG: 1 INJECTION, SOLUTION INTRAVENOUS at 01:30

## 2019-08-20 RX ADMIN — METOPROLOL TARTRATE 5 MG: 1 INJECTION, SOLUTION INTRAVENOUS at 15:02

## 2019-08-20 RX ADMIN — SODIUM CHLORIDE, PRESERVATIVE FREE 10 ML: 5 INJECTION INTRAVENOUS at 09:07

## 2019-08-20 RX ADMIN — METOPROLOL TARTRATE 5 MG: 1 INJECTION, SOLUTION INTRAVENOUS at 23:21

## 2019-08-20 RX ADMIN — SENNOSIDES 17.2 MG: 8.6 TABLET, FILM COATED ORAL at 09:05

## 2019-08-20 RX ADMIN — INSULIN LISPRO 2 UNITS: 100 INJECTION, SOLUTION INTRAVENOUS; SUBCUTANEOUS at 01:30

## 2019-08-20 RX ADMIN — INSULIN LISPRO 2 UNITS: 100 INJECTION, SOLUTION INTRAVENOUS; SUBCUTANEOUS at 09:12

## 2019-08-20 RX ADMIN — SODIUM CHLORIDE, PRESERVATIVE FREE 10 ML: 5 INJECTION INTRAVENOUS at 23:29

## 2019-08-20 RX ADMIN — INSULIN LISPRO 4 UNITS: 100 INJECTION, SOLUTION INTRAVENOUS; SUBCUTANEOUS at 14:25

## 2019-08-20 RX ADMIN — LEVOFLOXACIN 500 MG: 5 INJECTION, SOLUTION INTRAVENOUS at 15:02

## 2019-08-20 RX ADMIN — CALCIUM GLUCONATE: 98 INJECTION, SOLUTION INTRAVENOUS at 18:11

## 2019-08-20 RX ADMIN — OLANZAPINE 5 MG: 10 INJECTION, POWDER, LYOPHILIZED, FOR SOLUTION INTRAMUSCULAR at 23:27

## 2019-08-20 RX ADMIN — WATER 2.1 ML: 1 INJECTION INTRAMUSCULAR; INTRAVENOUS; SUBCUTANEOUS at 23:28

## 2019-08-20 RX ADMIN — SENNOSIDES 17.2 MG: 8.6 TABLET, FILM COATED ORAL at 23:28

## 2019-08-20 ASSESSMENT — PAIN SCALES - WONG BAKER
WONGBAKER_NUMERICALRESPONSE: 0
WONGBAKER_NUMERICALRESPONSE: 0

## 2019-08-20 ASSESSMENT — PAIN SCALES - GENERAL
PAINLEVEL_OUTOF10: 0
PAINLEVEL_OUTOF10: 0

## 2019-08-20 NOTE — PROGRESS NOTES
[facility]    Gastroenterology Daily Progress Note      Patient:   George Jeff   :    1960   Facility:   [facility]   Date:     2019  Consultant:   Maritza Gonzalez CNP      Subjective:     62 y.o. female admitted 2019 with Altered mental status [R41.82] and seen for colonic obstruction. Patient seen and examined. Patient just returned from lumbar puncture. Patient is more alert and talkative today. Patient reports she has been passing small amount of flatus. No stool. Records indicate patient received GoLYTELY last evening and had 3 watery stools following administration. Patient reports abdominal discomfort with palpation  NG tube clamped and being assessed for residual aspirate every 4 hours. No high residuals documented. Objective     Scheduled Meds:   senna  2 tablet Oral BID    levofloxacin  500 mg Intravenous Q24H    OLANZapine  5 mg Intramuscular QPM    And    sterile water  2.1 mL Intramuscular QPM    metoprolol  5 mg Intravenous Q6H    [Held by provider] traZODone  100 mg Oral Nightly    [Held by provider] atorvastatin  20 mg Oral Daily    sodium chloride flush  10 mL Intravenous 2 times per day    [Held by provider] enoxaparin  40 mg Subcutaneous Daily    insulin lispro  0-12 Units Subcutaneous Q6H    [Held by provider] Insulin Glargine-Lixisenatide  50 Units Subcutaneous Daily       Vital Signs:  /68   Pulse 70   Temp 98.3 °F (36.8 °C) (Oral)   Resp 15   Ht 5' 5\" (1.651 m)   Wt 185 lb 3 oz (84 kg)   SpO2 96%   BMI 30.82 kg/m²      Physical Exam:   General:  Lying in bed. No obvious distress  Chest:   Bilateral vesicular breath sounds, no rales or wheezes. Cardiac:  S1 S2 RR, no murmurs or gallops  Abdomen: Soft, mildly distended, BS audible. OGT with brown output, tensing of abdomen with palpation  SKIN:  No rashes, good skin turgor.  No jaundice  Extremities:  No edema, no clubbing, No COMPARISON: 08/17/2019, 813 hours HISTORY: ORDERING SYSTEM PROVIDED HISTORY: jakub TECHNOLOGIST PROVIDED HISTORY: jakub Reason for Exam: [place] Acuity: Chronic 51-year-old female with [place] syndrome FINDINGS: Portable supine views of the abdomen. Nasogastric tube traverses the GE junction with distal tip projecting over the medial mid abdomen likely in the body of the stomach. Surgical staples are seen projecting over the right upper quadrant. Prior cholecystectomy. Residual contrast is seen throughout the visualized colonic segments. The amount of colonic and cecal distension has decreased/markedly improved since the prior study. Psoas shadows symmetric in appearance. Mild to moderate degenerative changes throughout the spine. Distal gas and contrast are seen in the sigmoid colon and rectal vault region. Pelvic phleboliths. Issa catheter present. Diffuse osteopenia. Mild bilateral hip osteoarthrosis. No abnormally dilated small bowel loops identified. 1. Residual contrast throughout the visualized colon. Colonic and cecal distention has decreased/markedly improved since the prior study. No abnormally dilated small bowel loops identified. 2. Nasogastric tube distal tip projecting over the medial mid abdomen likely in the body of the stomach. Ct Abdomen Pelvis W Iv Contrast Additional Contrast? None    Result Date: 8/16/2019  EXAMINATION: CT OF THE ABDOMEN AND PELVIS WITH CONTRAST 8/16/2019 12:08 pm TECHNIQUE: CT of the abdomen and pelvis was performed with the administration of intravenous contrast. Multiplanar reformatted images are provided for review. Dose modulation, iterative reconstruction, and/or weight based adjustment of the mA/kV was utilized to reduce the radiation dose to as low as reasonably achievable.  COMPARISON: 08/10/2019 HISTORY: ORDERING SYSTEM PROVIDED HISTORY: Abdominal distention, tenderness TECHNOLOGIST PROVIDED HISTORY: Reason for Exam: abdominal distension FINDINGS: Lower Chest: Heterogeneous attenuation of the lungs is stable, and may be due to areas of air trapping or edema. There is reticulonodular scarring in the lingula, which is stable. Organs: The liver, spleen, pancreas and adrenal glands are unremarkable. Status post cholecystectomy. Both kidneys are functional and there is no hydronephrosis. GI/Bowel: A nasogastric tube terminates in the distal stomach. There is oral contrast throughout the colon and rectum, which is distended with air. The cecum measures up to 12 cm in craniocaudal dimension, measuring 10 cm 5 days prior. There is no evidence of small-bowel obstruction. Pelvis: The urinary bladder is nondistended. No pelvic mass or fluid collection. Peritoneum/Retroperitoneum: The abdominal aorta is normal in course and caliber. No retroperitoneal lymphadenopathy. Bones/Soft Tissues: No suspicious osteolytic or osteoblastic lesions are demonstrated. 1. The colon is diffusely distended with air, which is slightly increased from 5 days prior. The cecum now measures up to 12 cm in size. 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 No evidence of pneumatosis or large bowel obstruction. Previously demonstrated stool within the colon has been passed. 2. No evidence of small-bowel obstruction. The oral contrast from yesterday's small-bowel follow-through is now primarily within the colon and rectum. Fl Small Bowel Follow Through Only    Result Date: 8/15/2019  EXAMINATION: SMALL BOWEL FOLLOW THROUGH SERIES 8/15/2019 TECHNIQUE: Small bowel follow through series was performed with overhead images and spot images. FLUOROSCOPY DOSE AND TYPE OR TIME AND EXPOSURES: 0 COMPARISON: Abdomen radiograph dated 08/15/2019 HISTORY: ORDERING SYSTEM PROVIDED HISTORY: obstruction? TECHNOLOGIST PROVIDED HISTORY: obstruction?  FINDINGS:  image of the abdomen demonstrates a mild gaseous distention of the colon similar to prior

## 2019-08-20 NOTE — PROGRESS NOTES
mg Intravenous Q24H    OLANZapine  5 mg Intramuscular QPM    And    sterile water  2.1 mL Intramuscular QPM    metoprolol  5 mg Intravenous Q6H    [Held by provider] traZODone  100 mg Oral Nightly    [Held by provider] atorvastatin  20 mg Oral Daily    sodium chloride flush  10 mL Intravenous 2 times per day    enoxaparin  40 mg Subcutaneous Daily    insulin lispro  0-12 Units Subcutaneous Q6H    [Held by provider] Insulin Glargine-Lixisenatide  50 Units Subcutaneous Daily     Continuous Infusions:    PN-Adult 2-in-1 Peripheral Line (Standard) 65 mL/hr at 19 1807    sodium chloride 50 mL/hr at 19 1110    dextrose       PRN Meds: promethazine, hydrALAZINE, diphenhydrAMINE, glucose, dextrose, glucagon (rDNA), dextrose, sodium chloride flush, [DISCONTINUED] potassium chloride **OR** [DISCONTINUED] potassium alternative oral replacement **OR** potassium chloride, magnesium sulfate, magnesium hydroxide, ondansetron, nicotine, acetaminophen, atropine    Data:     Past Medical History:   has a past medical history of Depression, Diabetes mellitus ([address] and Thyroid disease. Social History:   reports that she quit smoking about 5 months ago. Her smoking use included cigarettes. She has a 40.00 pack-year smoking history. She has never used smokeless tobacco. She reports that she does not drink alcohol or use drugs. Family History:   Family History   Problem Relation Age of Onset    Diabetes Mother     Heart Disease Mother     Stroke Brother        Vitals:  /88   Pulse 67   Temp 98.2 °F (36.8 °C) (Oral)   Resp 21   Ht 5' 5\" (1.651 m)   Wt 185 lb 3 oz (84 kg)   SpO2 95%   BMI 30.82 kg/m²   Temp (24hrs), Av.3 °F (36.8 °C), Min:97.5 °F (36.4 °C), Max:99 °F (37.2 °C)    Recent Labs     19  1304 19  1800 19  0121 19  0620   POCGLU 169* 147* 141* 163*       I/O (24Hr):     Intake/Output Summary (Last 24 hours) at 2019 1304  Last data filed at 2019

## 2019-08-20 NOTE — PLAN OF CARE
Full skin assessment completed this shift. No new skin breakdown at this time. Pt repositioned q2h and prn. Pt kept clean and dry. Gel mattress in place on bed, heels floated. Will continue to monitor. Pt's pain assessed frequently with hourly rounding; assessed all pain characteristics including level, type, location, frequency, and onset. Pt medicated by RN per PRN orders. Non-pharmacologic interventions offered to pt as well. Pt states pain is tolerable at this time. Will continue to monitor. Pt remains free from falls at this time. Floor free from obstacles, and bed is locked and in lowest position. Adequate lighting provided. Call light within reach; pt encouraged to call before getting OOB for any need. Will continue to monitor needs during hourly rounding.     Electronically signed by Patel Welch RN on 8/20/2019 at 4:35 AM

## 2019-08-20 NOTE — PROGRESS NOTES
 potassium chloride 10 mEq/100 mL IVPB (Peripheral Line)  10 mEq Intravenous PRN Daniel Alexander  mL/hr at 08/18/19 1306 10 mEq at 08/18/19 1306    magnesium sulfate 1 g in dextrose 5% 100 mL IVPB  1 g Intravenous PRN Daniel Alexander MD        magnesium hydroxide (MILK OF MAGNESIA) 400 MG/5ML suspension 30 mL  30 mL Oral Daily PRN Daniel Alexander MD        ondansetron (ZOFRAN) injection 4 mg  4 mg Intravenous Q6H PRN Daniel Alexander MD   4 mg at 08/16/19 1142    nicotine (NICODERM CQ) 21 MG/24HR 1 patch  1 patch Transdermal Daily PRN Daniel Alexander MD        acetaminophen (TYLENOL) tablet 650 mg  650 mg Oral Q4H PRN Daniel Alexander MD        [Held by provider] enoxaparin (LOVENOX) injection 40 mg  40 mg Subcutaneous Daily Daniel Alexander MD   40 mg at 08/18/19 0852    insulin lispro (HUMALOG) injection vial 0-12 Units  0-12 Units Subcutaneous Q6H Daniel Alexander MD   2 Units at 08/20/19 0130    atropine injection 0.4 mg  0.4 mg Intravenous Q5 Min PRN Daniel Alexander MD   0.4 mg at 08/11/19 2208    [Held by provider] Insulin Glargine-Lixisenatide 100-33 UNT-MCG/ML SOPN 50 Units  50 Units Subcutaneous Daily [place] Purple, DO                Additional Lab/culture results:    Physical Exam:   NAD  Not answering questions. Peripheral pulses palpable  Regular rhythm  Respirations nonlabored, symmetric chest rise bilaterally  Soft, NT, ND  No catheter         Interval Imaging Findings:    Culture Results:  UCx NG (8/18/19)    Impression:    The patient is a 62 y.o. female who presents with       Problem List  - Urinary retention, Issa placed for 600cc     Plan:      - CIC After patient voids, check a post void residual. Record voided and PVR amount. Perform clean intermittent catheterization (CIC) for PVR > 450 mL  - UCx NG (8/18/19)  - Cr 0.72  - CT abdomen and pelvis without contrast reviewed (8/10/19): No hydronephrosis. Colonic distention.      [address] [facility]  Urology Resident, PGY3  6:28 AM 8/20/2019     I have

## 2019-08-20 NOTE — PROGRESS NOTES
General Surgery:  Daily Progress Note              PATIENT NAME: Jenna Rick     TODAY'S DATE: 8/20/2019, 6:15 AM    SUBJECTIVE:     Pt seen and examined at bedside. No acute events overnight. Nurse states that pt was more responsive than yesterday and she had three watery brown BMs. Nurse also performed straight catheterization on her once overnight with over 1000cc of urine output. Issa removed yesterday and NGT was clamped around 5pm yesterday. Afebrile. OBJECTIVE:   VITALS:  /68   Pulse 70   Temp 98.3 °F (36.8 °C) (Oral)   Resp 15   Ht 5' 5\" (1.651 m)   Wt 185 lb 3 oz (84 kg)   SpO2 96%   BMI 30.82 kg/m²      INTAKE/OUTPUT:      Intake/Output Summary (Last 24 hours) at 8/20/2019 0615  Last data filed at 8/20/2019 0446  Gross per 24 hour   Intake 6691.03 ml   Output 2000 ml   Net 4691.03 ml       PHYSICAL EXAM:  General Appearance: awake, alert, oriented, in no acute distress  HEENT:  Normocephalic, atraumatic, mucus membranes moist   Heart: Heart sounds are normal.  Regular rate and rhythm without murmur, gallop or rub. Lungs: Normal expansion. Clear to auscultation. No rales, rhonchi, or wheezing. Abdomen: soft, non tender. Hyperactive bowel sounds, No rebound or guarding. Extremities: No cyanosis, pitting edema, rashes noted. Skin: Skin color, texture, turgor normal. No rashes or lesions.     Data:  CBC:   Lab Results   Component Value Date    WBC 8.4 08/19/2019    RBC 4.39 08/19/2019    HGB 12.8 08/19/2019    HCT 38.9 08/19/2019    MCV 88.6 08/19/2019    MCH 29.2 08/19/2019    MCHC 32.9 08/19/2019    RDW 12.2 08/19/2019     08/19/2019    MPV 10.2 08/19/2019     WBC:    Lab Results   Component Value Date    WBC 8.4 08/19/2019     Hemoglobin/Hematocrit:    Lab Results   Component Value Date    HGB 12.8 08/19/2019    HCT 38.9 08/19/2019     BMP:    Lab Results   Component Value Date     08/19/2019    K 4.0 08/19/2019     08/19/2019    CO2 27 08/19/2019    BUN 14

## 2019-08-21 LAB
ANION GAP SERPL CALCULATED.3IONS-SCNC: 14 MMOL/L (ref 9–17)
BUN BLDV-MCNC: 12 MG/DL (ref 6–20)
BUN/CREAT BLD: ABNORMAL (ref 9–20)
CALCIUM SERPL-MCNC: 8.6 MG/DL (ref 8.6–10.4)
CHLORIDE BLD-SCNC: 105 MMOL/L (ref 98–107)
CO2: 21 MMOL/L (ref 20–31)
CREAT SERPL-MCNC: 0.56 MG/DL (ref 0.5–0.9)
GFR AFRICAN AMERICAN: >60 ML/MIN
GFR NON-AFRICAN AMERICAN: >60 ML/MIN
GFR SERPL CREATININE-BSD FRML MDRD: ABNORMAL ML/MIN/{1.73_M2}
GFR SERPL CREATININE-BSD FRML MDRD: ABNORMAL ML/MIN/{1.73_M2}
GLUCOSE BLD-MCNC: 154 MG/DL (ref 65–105)
GLUCOSE BLD-MCNC: 162 MG/DL (ref 65–105)
GLUCOSE BLD-MCNC: 174 MG/DL (ref 65–105)
GLUCOSE BLD-MCNC: 186 MG/DL (ref 65–105)
GLUCOSE BLD-MCNC: 186 MG/DL (ref 65–105)
GLUCOSE BLD-MCNC: 190 MG/DL (ref 70–99)
GLUCOSE BLD-MCNC: 197 MG/DL (ref 65–105)
GLUCOSE BLD-MCNC: 207 MG/DL (ref 65–105)
MAGNESIUM: 2.1 MG/DL (ref 1.6–2.6)
PHOSPHORUS: 3.3 MG/DL (ref 2.6–4.5)
POTASSIUM SERPL-SCNC: 3.9 MMOL/L (ref 3.7–5.3)
SODIUM BLD-SCNC: 140 MMOL/L (ref 135–144)

## 2019-08-21 PROCEDURE — 2500000003 HC RX 250 WO HCPCS: Performed by: INTERNAL MEDICINE

## 2019-08-21 PROCEDURE — 6370000000 HC RX 637 (ALT 250 FOR IP): Performed by: INTERNAL MEDICINE

## 2019-08-21 PROCEDURE — APPNB45 APP NON BILLABLE 31-45 MINUTES: Performed by: INTERNAL MEDICINE

## 2019-08-21 PROCEDURE — 99232 SBSQ HOSP IP/OBS MODERATE 35: CPT | Performed by: NURSE PRACTITIONER

## 2019-08-21 PROCEDURE — 80048 BASIC METABOLIC PNL TOTAL CA: CPT

## 2019-08-21 PROCEDURE — 94761 N-INVAS EAR/PLS OXIMETRY MLT: CPT

## 2019-08-21 PROCEDURE — 6360000002 HC RX W HCPCS: Performed by: INTERNAL MEDICINE

## 2019-08-21 PROCEDURE — 2060000000 HC ICU INTERMEDIATE R&B

## 2019-08-21 PROCEDURE — 84100 ASSAY OF PHOSPHORUS: CPT

## 2019-08-21 PROCEDURE — 87086 URINE CULTURE/COLONY COUNT: CPT

## 2019-08-21 PROCEDURE — 99232 SBSQ HOSP IP/OBS MODERATE 35: CPT | Performed by: INTERNAL MEDICINE

## 2019-08-21 PROCEDURE — 2580000003 HC RX 258: Performed by: INTERNAL MEDICINE

## 2019-08-21 PROCEDURE — 6370000000 HC RX 637 (ALT 250 FOR IP): Performed by: NURSE PRACTITIONER

## 2019-08-21 PROCEDURE — 83735 ASSAY OF MAGNESIUM: CPT

## 2019-08-21 PROCEDURE — 93971 EXTREMITY STUDY: CPT

## 2019-08-21 PROCEDURE — 36415 COLL VENOUS BLD VENIPUNCTURE: CPT

## 2019-08-21 PROCEDURE — 51798 US URINE CAPACITY MEASURE: CPT

## 2019-08-21 PROCEDURE — 51701 INSERT BLADDER CATHETER: CPT

## 2019-08-21 RX ORDER — RISPERIDONE 1 MG/1
1 TABLET, FILM COATED ORAL DAILY
Status: DISCONTINUED | OUTPATIENT
Start: 2019-08-21 | End: 2019-08-29 | Stop reason: HOSPADM

## 2019-08-21 RX ADMIN — INSULIN LISPRO 2 UNITS: 100 INJECTION, SOLUTION INTRAVENOUS; SUBCUTANEOUS at 22:02

## 2019-08-21 RX ADMIN — SODIUM CHLORIDE, PRESERVATIVE FREE 10 ML: 5 INJECTION INTRAVENOUS at 22:18

## 2019-08-21 RX ADMIN — SENNOSIDES 17.2 MG: 8.6 TABLET, FILM COATED ORAL at 22:17

## 2019-08-21 RX ADMIN — SODIUM CHLORIDE, PRESERVATIVE FREE 10 ML: 5 INJECTION INTRAVENOUS at 08:20

## 2019-08-21 RX ADMIN — SENNOSIDES 17.2 MG: 8.6 TABLET, FILM COATED ORAL at 08:20

## 2019-08-21 RX ADMIN — INSULIN LISPRO 2 UNITS: 100 INJECTION, SOLUTION INTRAVENOUS; SUBCUTANEOUS at 08:13

## 2019-08-21 RX ADMIN — LEVOFLOXACIN 500 MG: 5 INJECTION, SOLUTION INTRAVENOUS at 14:08

## 2019-08-21 RX ADMIN — INSULIN LISPRO 2 UNITS: 100 INJECTION, SOLUTION INTRAVENOUS; SUBCUTANEOUS at 15:49

## 2019-08-21 RX ADMIN — METOPROLOL TARTRATE 5 MG: 1 INJECTION, SOLUTION INTRAVENOUS at 15:43

## 2019-08-21 RX ADMIN — OLANZAPINE 5 MG: 10 INJECTION, POWDER, LYOPHILIZED, FOR SOLUTION INTRAMUSCULAR at 22:09

## 2019-08-21 RX ADMIN — METOPROLOL TARTRATE 5 MG: 1 INJECTION, SOLUTION INTRAVENOUS at 08:19

## 2019-08-21 RX ADMIN — SODIUM CHLORIDE: 9 INJECTION, SOLUTION INTRAVENOUS at 04:13

## 2019-08-21 RX ADMIN — RISPERIDONE 1 MG: 1 TABLET, FILM COATED ORAL at 11:26

## 2019-08-21 RX ADMIN — ENOXAPARIN SODIUM 40 MG: 40 INJECTION SUBCUTANEOUS at 08:19

## 2019-08-21 RX ADMIN — METOPROLOL TARTRATE 5 MG: 1 INJECTION, SOLUTION INTRAVENOUS at 22:01

## 2019-08-21 RX ADMIN — INSULIN LISPRO 2 UNITS: 100 INJECTION, SOLUTION INTRAVENOUS; SUBCUTANEOUS at 04:01

## 2019-08-21 RX ADMIN — WATER 2.1 ML: 1 INJECTION INTRAMUSCULAR; INTRAVENOUS; SUBCUTANEOUS at 22:09

## 2019-08-21 RX ADMIN — POLYETHYLENE GLYCOL 3350 17 G: 17 POWDER, FOR SOLUTION ORAL at 08:20

## 2019-08-21 RX ADMIN — I.V. FAT EMULSION 100 ML: 20 EMULSION INTRAVENOUS at 18:48

## 2019-08-21 RX ADMIN — POTASSIUM CHLORIDE: 2 INJECTION, SOLUTION, CONCENTRATE INTRAVENOUS at 17:47

## 2019-08-21 RX ADMIN — METOPROLOL TARTRATE 5 MG: 1 INJECTION, SOLUTION INTRAVENOUS at 04:08

## 2019-08-21 ASSESSMENT — PAIN SCALES - WONG BAKER
WONGBAKER_NUMERICALRESPONSE: 0

## 2019-08-21 ASSESSMENT — PAIN SCALES - GENERAL: PAINLEVEL_OUTOF10: 0

## 2019-08-21 NOTE — PROGRESS NOTES
Physical Therapy  DATE: 2019    NAME: Jolene Canela  MRN: [id number]   : 1960    Patient not seen this date for Physical Therapy due to:  [] Blood transfusion in progress  [] Hemodialysis  []  Patient Declined  [] Spine Precautions   [] Strict Bedrest  [] Surgery/ Procedure  [] Testing      [x] Other---dopplers ordered         [] PT being discontinued at this time. Patient independent. No further needs. [] PT being discontinued at this time as the patient has been transferred to palliative care. No further needs.     Layo Cobos, PTA

## 2019-08-21 NOTE — PROGRESS NOTES
°C), Max:97.8 °F (36.6 °C)    Recent Labs     08/20/19  2136 08/20/19 2358 08/21/19  0344 08/21/19  0651   POCGLU 207* 154* 174* 186*       I/O (24Hr): Intake/Output Summary (Last 24 hours) at 8/21/2019 1254  Last data filed at 8/21/2019 0800  Gross per 24 hour   Intake --   Output 2960 ml   Net -2960 ml       Labs:  Hematology:  Recent Labs     08/19/19  0730 08/20/19 2041   WBC 8.4 8.9   RBC 4.39 4.71   HGB 12.8 13.6   HCT 38.9 42.5   MCV 88.6 90.2   MCH 29.2 28.9   MCHC 32.9 32.0   RDW 12.2 12.5    228   MPV 10.2 10.0     Chemistry:  Recent Labs     08/19/19  0730 08/20/19 0719 08/20/19 2041 08/21/19  0536    145* 139 140   K 4.0 3.5* 3.9 3.9    107 105 105   CO2 27 25 20 21   GLUCOSE 171* 185* 178* 190*   BUN 14 13 12 12   CREATININE 0.72 0.57 0.46* 0.56   MG 1.9 2.0  --  2.1   ANIONGAP 12 13 14 14   LABGLOM >60 >60 >60 >60   GFRAA >60 >60 >60 >60   CALCIUM 8.7 8.2* 8.4* 8.6   PHOS 3.8 3.0  --  3.3     Recent Labs     08/19/19  0730  08/20/19  1425 08/20/19 1748 08/20/19 2041 08/20/19 2136 08/20/19 2358 08/21/19  0344 08/21/19  0651   PROT 6.0*  --   --   --   --   --   --   --   --    LABALBU 3.1*  --   --   --   --   --   --   --   --    AST 43*  --   --   --   --   --   --   --   --    *  --   --   --   --   --   --   --   --    ALKPHOS 100  --   --   --   --   --   --   --   --    BILITOT 0.36  --   --   --   --   --   --   --   --    AMMONIA 23  --   --   --  33  --   --   --   --    TRIG 298*  --   --   --   --   --   --   --   --    POCGLU  --    < > 207* 145*  --  207* 154* 174* 186*    < > = values in this interval not displayed.      ABG:  Lab Results   Component Value Date    POCPH 7.479 08/20/2019    POCPCO2 33.0 08/20/2019    POCPO2 83.9 08/20/2019    POCHCO3 24.5 08/20/2019    NBEA NOT REPORTED 08/20/2019    PBEA 2 08/20/2019    THM7KJF 26 08/20/2019    KEDQ2TUU 97 08/20/2019    FIO2 NOT REPORTED 08/20/2019     Lab Results   Component Value Date/Time    SPECIAL Acute Abd Series Chest 1 Vw    Result Date: 8/10/2019  Dilation of multiple bowel loops in the abdomen and pelvis. It is unclear whether this represents severe ileus or bowel obstruction. No evidence of free air to suggest perforation. RECOMMENDATION: Abdominal CT for further evaluation. Ct Head Wo Contrast    Result Date: 8/10/2019  No acute intracranial abnormality. Xr Chest Portable    Result Date: 8/9/2019  No change from prior study. Top-normal central vascularity. Unchanged left basilar opacity. Xr Chest Portable    Result Date: 8/8/2019  Mild central congestion with left lower lung opacity, stable from prior exam.       Physical Examination:        General appearance: Does not follow commands, but more awake today  Mental Status: cannot be assessed due to mentation  Lungs:  clear to auscultation bilaterally, normal effort  Heart:  regular rate and rhythm, no murmur  Abdomen:  Soft, tender, distention is improving  extremities:  no edema, redness, tenderness in the calves, right upper extremity swelling  Skin:  no gross lesions, rashes, induration    Assessment:        Hospital Problems           Last Modified POA    * (Principal) Toxic metabolic encephalopathy 7/84/2070 Yes    Pseudo-obstruction of colon 8/11/2019 Yes    Bacterial pneumonia 8/11/2019 Yes    Controlled type 2 diabetes mellitus without complication, with long-term current use of insulin ([address] 8/11/2019 Yes    Dyslipidemia 8/11/2019 Yes    Other specified hypothyroidism 8/11/2019 Yes    Schizophrenia ([address] 8/11/2019 Yes    Moderate malnutrition ([address] 8/16/2019 No          Plan:        1. Status post 7-day course of cefepime for pneumonia  2. Neurology input noted and appreciated status post lumbar puncture, plan for MRI as well  3. Psychiatric medications on hold, added back on Zyprexa by GI, Risperdal added, monitor response to that, psychiatry consultation once patient is more awake  4.  General surgery input noted, they have

## 2019-08-21 NOTE — PROGRESS NOTES
Reason for Exam: [place] Acuity: Chronic 40-year-old female with [place] syndrome FINDINGS: Portable supine views of the abdomen. Nasogastric tube traverses the GE junction with distal tip projecting over the medial mid abdomen likely in the body of the stomach. Surgical staples are seen projecting over the right upper quadrant. Prior cholecystectomy. Residual contrast is seen throughout the visualized colonic segments. The amount of colonic and cecal distension has decreased/markedly improved since the prior study. Psoas shadows symmetric in appearance. Mild to moderate degenerative changes throughout the spine. Distal gas and contrast are seen in the sigmoid colon and rectal vault region. Pelvic phleboliths. Issa catheter present. Diffuse osteopenia. Mild bilateral hip osteoarthrosis. No abnormally dilated small bowel loops identified. 1. Residual contrast throughout the visualized colon. Colonic and cecal distention has decreased/markedly improved since the prior study. No abnormally dilated small bowel loops identified. 2. Nasogastric tube distal tip projecting over the medial mid abdomen likely in the body of the stomach. Ct Abdomen Pelvis W Iv Contrast Additional Contrast? None    Result Date: 8/16/2019  EXAMINATION: CT OF THE ABDOMEN AND PELVIS WITH CONTRAST 8/16/2019 12:08 pm TECHNIQUE: CT of the abdomen and pelvis was performed with the administration of intravenous contrast. Multiplanar reformatted images are provided for review. Dose modulation, iterative reconstruction, and/or weight based adjustment of the mA/kV was utilized to reduce the radiation dose to as low as reasonably achievable. COMPARISON: 08/10/2019 HISTORY: ORDERING SYSTEM PROVIDED HISTORY: Abdominal distention, tenderness TECHNOLOGIST PROVIDED HISTORY: Reason for Exam: abdominal distension FINDINGS: Lower Chest: Heterogeneous attenuation of the lungs is stable, and may be due to areas of air trapping or edema.   There is reticulonodular scarring in the lingula, which is stable. Organs: The liver, spleen, pancreas and adrenal glands are unremarkable. Status post cholecystectomy. Both kidneys are functional and there is no hydronephrosis. GI/Bowel: A nasogastric tube terminates in the distal stomach. There is oral contrast throughout the colon and rectum, which is distended with air. The cecum measures up to 12 cm in craniocaudal dimension, measuring 10 cm 5 days prior. There is no evidence of small-bowel obstruction. Pelvis: The urinary bladder is nondistended. No pelvic mass or fluid collection. Peritoneum/Retroperitoneum: The abdominal aorta is normal in course and caliber. No retroperitoneal lymphadenopathy. Bones/Soft Tissues: No suspicious osteolytic or osteoblastic lesions are demonstrated. 1. The colon is diffusely distended with air, which is slightly increased from 5 days prior. The cecum now measures up to 12 cm in size. 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 No evidence of pneumatosis or large bowel obstruction. Previously demonstrated stool within the colon has been passed. 2. No evidence of small-bowel obstruction. The oral contrast from yesterday's small-bowel follow-through is now primarily within the colon and rectum. Fl Small Bowel Follow Through Only    Result Date: 8/15/2019  EXAMINATION: SMALL BOWEL FOLLOW THROUGH SERIES 8/15/2019 TECHNIQUE: Small bowel follow through series was performed with overhead images and spot images. FLUOROSCOPY DOSE AND TYPE OR TIME AND EXPOSURES: 0 COMPARISON: Abdomen radiograph dated 08/15/2019 HISTORY: ORDERING SYSTEM PROVIDED HISTORY: obstruction? TECHNOLOGIST PROVIDED HISTORY: obstruction? FINDINGS:  image of the abdomen demonstrates a mild gaseous distention of the colon similar to prior radiograph. There is normal transit time to the terminal ileum. Contrast is present in the duodenum and jejunum at 0 minutes. colon were noted. The procedure was done with underwater technique to avoid further insufflation of the colon. We infused at least 1.2 liters of water throughout the colon.         RECOMMENDATIONS:   1) KUB. 2) Bowel regimen  3) Monitor.        Deepa Aguilar MD [facility]    Assessment:     1. Colonic pseudo-obstruction   - S/P colonic decompression 08/12/2019,  08/16/2019.   - Had large loose tan stool 08/21/2019     2. AMS - unclear etiology, ? R/t underlying psych issues and/or medication - neuro also following     Plan:     1. Continue bowel regimen. Currently on Senna tab.    2. Ok to start TF per nutritionist recommendations - discussed with RN  3. Serial abdominal examinations  4. Encourage out of bed and/or ambulation   5. Ok to restart Risperdal from GI perspective  6. Please call GI with any questions. Concerns or acute change in clinical status       This plan was formulated in collaboration with Dr. Shira Daneil     Electronically signed by Venida Ganser APRN - CNP on 8/21/2019 at 7:08 AM    Please note that this note was generated using a voice recognition dictation software. Although every effort was made to ensure the accuracy of this automated transcription, some errors in transcription may have occurred.

## 2019-08-21 NOTE — PROGRESS NOTES
100ml, Daily  · Rate/Volume: 65 mL/hr (1560 mL/day)  · Duration: Continuous  · Current PN Order Provides: 990 kcal and 70 gm protein per day   · Anthropometric Measures:  · Ht: 5' 5\" (165.1 cm)   · Current Body Wt: 185 lb 3 oz (84 kg)  · Admission Body Wt: 184 lb (83.5 kg)  · % Weight Change:   wt flux since admission   · Ideal Body Wt: 125 lb (56.7 kg), % Ideal Body 147%  · BMI Classification: BMI 30.0 - 34.9 Obese Class I(30.9)    Nutrition Interventions:   Continue NPO, Start Tube Feeding, Modify current Parenteral Nutrition - Start TF of Vital AF 1.2 (semi-elemental) at 10 mL/hr - slowly advance to goal 60 ml/hr. Continue PPN until dar TF - increase PPN rate to 70 mL/hr, increase AA to 80 gm. Continued Inpatient Monitoring, Education Not Indicated    Nutrition Evaluation:   · Evaluation: Progressing toward goals   · Goals: Meet % of estimated nutrition needs.    · Monitoring: Nutrition Progression, PN Intake, PN Tolerance, TF Intake, TF Tolerance, Skin Integrity, Wound Healing, I&O, Weight, Pertinent Labs, Monitor Bowel Function, Monitor Hemodynamic Status    Electronically signed by Brianne Pop RD, RAJIV on 8/21/19 at 11:08 AM    Contact Number: [phone]

## 2019-08-22 LAB
AMMONIA: 101 UMOL/L (ref 11–51)
ANION GAP SERPL CALCULATED.3IONS-SCNC: 16 MMOL/L (ref 9–17)
BUN BLDV-MCNC: 13 MG/DL (ref 6–20)
BUN/CREAT BLD: ABNORMAL (ref 9–20)
CALCIUM SERPL-MCNC: 9 MG/DL (ref 8.6–10.4)
CHLORIDE BLD-SCNC: 104 MMOL/L (ref 98–107)
CO2: 20 MMOL/L (ref 20–31)
CREAT SERPL-MCNC: 0.6 MG/DL (ref 0.5–0.9)
CULTURE: NO GROWTH
CULTURE: NORMAL
CULTURE: NORMAL
GFR AFRICAN AMERICAN: >60 ML/MIN
GFR NON-AFRICAN AMERICAN: >60 ML/MIN
GFR SERPL CREATININE-BSD FRML MDRD: ABNORMAL ML/MIN/{1.73_M2}
GFR SERPL CREATININE-BSD FRML MDRD: ABNORMAL ML/MIN/{1.73_M2}
GLUCOSE BLD-MCNC: 185 MG/DL (ref 65–105)
GLUCOSE BLD-MCNC: 186 MG/DL (ref 65–105)
GLUCOSE BLD-MCNC: 188 MG/DL (ref 65–105)
GLUCOSE BLD-MCNC: 196 MG/DL (ref 70–99)
GLUCOSE BLD-MCNC: 224 MG/DL (ref 65–105)
Lab: NORMAL
MAGNESIUM: 2.1 MG/DL (ref 1.6–2.6)
PHOSPHORUS: 4.1 MG/DL (ref 2.6–4.5)
POTASSIUM SERPL-SCNC: 3.9 MMOL/L (ref 3.7–5.3)
SODIUM BLD-SCNC: 140 MMOL/L (ref 135–144)
SPECIMEN DESCRIPTION: NORMAL

## 2019-08-22 PROCEDURE — 6360000002 HC RX W HCPCS: Performed by: INTERNAL MEDICINE

## 2019-08-22 PROCEDURE — 93971 EXTREMITY STUDY: CPT

## 2019-08-22 PROCEDURE — 2060000000 HC ICU INTERMEDIATE R&B

## 2019-08-22 PROCEDURE — 83735 ASSAY OF MAGNESIUM: CPT

## 2019-08-22 PROCEDURE — 99232 SBSQ HOSP IP/OBS MODERATE 35: CPT | Performed by: INTERNAL MEDICINE

## 2019-08-22 PROCEDURE — 80048 BASIC METABOLIC PNL TOTAL CA: CPT

## 2019-08-22 PROCEDURE — 36415 COLL VENOUS BLD VENIPUNCTURE: CPT

## 2019-08-22 PROCEDURE — 99232 SBSQ HOSP IP/OBS MODERATE 35: CPT | Performed by: NURSE PRACTITIONER

## 2019-08-22 PROCEDURE — 6370000000 HC RX 637 (ALT 250 FOR IP): Performed by: INTERNAL MEDICINE

## 2019-08-22 PROCEDURE — 6370000000 HC RX 637 (ALT 250 FOR IP): Performed by: NURSE PRACTITIONER

## 2019-08-22 PROCEDURE — 84100 ASSAY OF PHOSPHORUS: CPT

## 2019-08-22 PROCEDURE — 51798 US URINE CAPACITY MEASURE: CPT

## 2019-08-22 PROCEDURE — 90792 PSYCH DIAG EVAL W/MED SRVCS: CPT | Performed by: PSYCHIATRY & NEUROLOGY

## 2019-08-22 PROCEDURE — 2500000003 HC RX 250 WO HCPCS: Performed by: INTERNAL MEDICINE

## 2019-08-22 PROCEDURE — 82140 ASSAY OF AMMONIA: CPT

## 2019-08-22 PROCEDURE — 82947 ASSAY GLUCOSE BLOOD QUANT: CPT

## 2019-08-22 PROCEDURE — 51701 INSERT BLADDER CATHETER: CPT

## 2019-08-22 RX ORDER — LACTULOSE 10 G/15ML
20 SOLUTION ORAL 3 TIMES DAILY
Status: DISCONTINUED | OUTPATIENT
Start: 2019-08-22 | End: 2019-08-23

## 2019-08-22 RX ORDER — LEVOFLOXACIN 500 MG/1
500 TABLET, FILM COATED ORAL DAILY
Status: COMPLETED | OUTPATIENT
Start: 2019-08-22 | End: 2019-08-22

## 2019-08-22 RX ORDER — INSULIN GLARGINE 100 [IU]/ML
10 INJECTION, SOLUTION SUBCUTANEOUS NIGHTLY
Status: DISCONTINUED | OUTPATIENT
Start: 2019-08-22 | End: 2019-08-23

## 2019-08-22 RX ADMIN — LACTULOSE 20 G: 20 SOLUTION ORAL at 21:57

## 2019-08-22 RX ADMIN — ENOXAPARIN SODIUM 40 MG: 40 INJECTION SUBCUTANEOUS at 10:33

## 2019-08-22 RX ADMIN — LACTULOSE 20 G: 20 SOLUTION ORAL at 15:09

## 2019-08-22 RX ADMIN — SENNOSIDES 17.2 MG: 8.6 TABLET, FILM COATED ORAL at 12:26

## 2019-08-22 RX ADMIN — INSULIN LISPRO 2 UNITS: 100 INJECTION, SOLUTION INTRAVENOUS; SUBCUTANEOUS at 10:39

## 2019-08-22 RX ADMIN — METOPROLOL TARTRATE 12.5 MG: 25 TABLET ORAL at 21:59

## 2019-08-22 RX ADMIN — LEVOFLOXACIN 500 MG: 500 TABLET, FILM COATED ORAL at 15:09

## 2019-08-22 RX ADMIN — SENNOSIDES 17.2 MG: 8.6 TABLET, FILM COATED ORAL at 21:55

## 2019-08-22 RX ADMIN — INSULIN GLARGINE 10 UNITS: 100 INJECTION, SOLUTION SUBCUTANEOUS at 22:13

## 2019-08-22 RX ADMIN — RISPERIDONE 1 MG: 1 TABLET, FILM COATED ORAL at 10:33

## 2019-08-22 RX ADMIN — INSULIN LISPRO 2 UNITS: 100 INJECTION, SOLUTION INTRAVENOUS; SUBCUTANEOUS at 22:01

## 2019-08-22 RX ADMIN — INSULIN LISPRO 4 UNITS: 100 INJECTION, SOLUTION INTRAVENOUS; SUBCUTANEOUS at 17:45

## 2019-08-22 ASSESSMENT — PAIN SCALES - WONG BAKER
WONGBAKER_NUMERICALRESPONSE: 0

## 2019-08-22 NOTE — VIRTUAL HEALTH
Inpatient consult to Psychiatry  Consult performed by: Mulu Reynoso MD  Consult ordered by: Ligia Thomson MD  Reason for consult: Altered Mental Status         Patient Location:  [address] Stepdown    Provider Location (City/State):   [place], [place]    This virtual visit was conducted via interactive/real-time audio/video. Department of Psychiatry  Attending Psychiatric Assessment       CHIEF COMPLAINT:  RFC: Altered Mental Status, Schizophrenia    History obtained from:  patient, electronic medical record    HISTORY OF PRESENT ILLNESS:          The patient is a 62 y.o. female with significant past psych hx of Schizophrenia who was admitted on 8/11 with AMS. She was found to have pneumonia and psudo colon obstruction. She has developed Toxic Metabolic Encephalopathy. MRI, CT, EEG have been done and all are normal. This is a f/u consult. The first consult was performed on 8/15 by Dr. Renetta Garcia. Since that time the pt's mental status has improved. She was able to talk and carry on conversation with me today. She was able to remember past events and details of her life. She was still confused at times but seems to be making progress. She has been restarted on zyprexa 5mg and Risperdal 1mg. Her home psych meds were listed as Risperdal 1mg Qday, zyprexa 5mg HS, Trazodone 100mg HS, cogentin 0.5mg QDay, melatonin 5mg HS PRN and benadryl 50mg Q6H PRN. Pt said that she doesn't remember anything around the time of the hospital admission until just recently. She did tell me that she remembers that she was taking her medications prior to coming to the hospital and she was denying any AVH. Their is report and worry from sister that pt had not been taking her meds and was having AVH prior to being admitted. Pt said that she is still confused a lot and doesn't feel great. She has a feeding tube and is on TPN. Said that she is sleeping at night.  She denies any AVH since she can

## 2019-08-22 NOTE — PROGRESS NOTES
[address]    Progress Note    8/22/2019    1:18 PM    Name:   Ahmet Armenta  MRN:     [id number]     [place]:      [de-identified]   Room:   [address] [place] [address] Day:  11  Admit Date:  8/11/2019  2:33 PM    PCP:   DESIREE Dubois CNP  Code Status:  Full Code    Subjective:     C/C: Altered mental status, Lorenza syndrome    Interval History Status:   Patient was not answering my questions and not responding however later on she started answering the questions, ammonia level is elevated today though, apparently had a small bowel movement, venous ultrasound showed superficial vein thrombosis    Brief History: This is a 59-year-old  female who was admitted to [facility] on August 6 with a complaint of alteration of mentation and fever. Zev Brantley was found to have pneumonia and was treated with IV Maxipime.  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  As her mentation was not improving and has felt her Lorenza syndrome was related to her psychiatric medications she was transferred to [facility] for psychiatric evaluation and further intervention for the Lagrange syndrome.  She was evaluated by general surgery here and administer neostigmine and has undergone urgent colonoscopy for bowel decompression with the GI service    She is undergone electroencephalogram which was benign. Upon arrival here review of cultures showed no growth at 5 days and she is afebrile. And the IV cefepime was discontinued due to possible neurological side effects. She is remained largely unresponsive and not appropriate for psychiatric evaluation. Review of Systems:     Cannot be obtained due to altered mental status    Medications: Allergies:     Allergies   Allergen Reactions    Bicillin [Penicillin G Benzathine] Shortness Of Breath    Geodon [Ziprasidone]      \"I can't sit still\"       Current Meds: at 8/22/2019 0100  Gross per 24 hour   Intake 0 ml   Output 1900 ml   Net -1900 ml       Labs:  Hematology:  Recent Labs     08/20/19 2041   WBC 8.9   RBC 4.71   HGB 13.6   HCT 42.5   MCV 90.2   MCH 28.9   MCHC 32.0   RDW 12.5      MPV 10.0     Chemistry:  Recent Labs     08/20/19  0719 08/20/19  2041 08/21/19  0536 08/22/19  0621   * 139 140 140   K 3.5* 3.9 3.9 3.9    105 105 104   CO2 25 20 21 20   GLUCOSE 185* 178* 190* 196*   BUN 13 12 12 13   CREATININE 0.57 0.46* 0.56 0.60   MG 2.0  --  2.1 2.1   ANIONGAP 13 14 14 16   LABGLOM >60 >60 >60 >60   GFRAA >60 >60 >60 >60   CALCIUM 8.2* 8.4* 8.6 9.0   PHOS 3.0  --  3.3 4.1     Recent Labs     08/20/19  2041  08/21/19  1326 08/21/19  1737 08/21/19  2020 08/21/19  2350 08/22/19  0621 08/22/19  0633 08/22/19  1152   AMMONIA 33  --   --   --   --   --  101*  --   --    POCGLU  --    < > 197* 207* 186* 162*  --  188* 185*    < > = values in this interval not displayed. ABG:  Lab Results   Component Value Date    POCPH 7.479 08/20/2019    POCPCO2 33.0 08/20/2019    POCPO2 83.9 08/20/2019    POCHCO3 24.5 08/20/2019    NBEA NOT REPORTED 08/20/2019    PBEA 2 08/20/2019    CGB6OCE 26 08/20/2019    UYWJ2QHC 97 08/20/2019    FIO2 NOT REPORTED 08/20/2019     Lab Results   Component Value Date/Time    SPECIAL NOT REPORTED 08/21/2019 12:35 AM     Lab Results   Component Value Date/Time    CULTURE NO GROWTH 08/21/2019 12:35 AM       Radiology:  Ct Abdomen Pelvis Wo Contrast Additional Contrast? None    Result Date: 8/10/2019  *Colonic distension with air and fecal material noted most prominent involving the cecum measuring 10.2 cm in greatest dimension. There is questionable pneumatosis versus trapped gas was stool involving the cecum best seen on series 3, image 81. No focal colonic wall thickening or portal venous gas identified. No obstructing lesion is clearly identified. Developing bowel ischemia cannot be excluded.   Surgical consultation and

## 2019-08-22 NOTE — PROGRESS NOTES
Neurology Nurse Practitioner Progress Note      INTERVAL HISTORY: This is a 62 y.o.  female admitted 8/11/2019 for Altered mental status [R41.82]. This is a follow-up neurology progress note. The patient was examined and the chart was reviewed. Discussed with the RN. There were no acute events overnight. Pt with waxing & waning mentation with slow improvement. HPI: Leyda Becker is a 62 y.o. female with H/O DM, schizophrenia, who was admitted as a transfer from [facility] ED on 8/11/2019 for Altered mental status [R41.82]. According to the medical records, pt was found down in her house, confused & less responsive. She was found to have pneumonia & was started on ATB. There were some concerns about intestinal obstruction; got transferred to [facility] for further evaluation. She was found to have colonic pseudo-obstruction & underwent colonic decompressions on 8/12 & later on 8/16/19. Neurology was consulted due to on-going AMS.       risperiDONE  1 mg Oral Daily    fat emulsion  100 mL Intravenous Daily    polyethylene glycol  17 g Oral Daily    senna  2 tablet Oral BID    levofloxacin  500 mg Intravenous Q24H    OLANZapine  5 mg Intramuscular QPM    And    sterile water  2.1 mL Intramuscular QPM    metoprolol  5 mg Intravenous Q6H    [Held by provider] traZODone  100 mg Oral Nightly    [Held by provider] atorvastatin  20 mg Oral Daily    sodium chloride flush  10 mL Intravenous 2 times per day    enoxaparin  40 mg Subcutaneous Daily    insulin lispro  0-12 Units Subcutaneous Q6H    [Held by provider] Insulin Glargine-Lixisenatide  50 Units Subcutaneous Daily       Past Medical History:   Diagnosis Date    Depression     [facility] [address] [address] [facility]     Thyroid disease     Hypothyroid       Past Surgical History:   Procedure Laterality Date    ABDOMEN SURGERY      CHOLECYSTECTOMY      COLONOSCOPY  8/12/2019    COLONOSCOPY FLEXIBLE W/ DECOMPRESSION performed by Adali Estrada MD at [facility]

## 2019-08-22 NOTE — PROGRESS NOTES
[facility] Progress Note    Braeden Roman is a 62 y.o. female patient. Hospitalization Day:11      Chief consult reason:   Colonic obstruction    Subjective:  Pt seen and examined. Pt was not verbal this am to myself. Pt is on TF at 40 ml. Staff report small brown BM this am.   Pt still having facial grimacing with light abdominal palpation    VITALS:  /64   Pulse 69   Temp 98.4 °F (36.9 °C)   Resp 16   Ht 5' 5\" (1.651 m)   Wt 186 lb 4.6 oz (84.5 kg)   SpO2 93%   BMI 31.00 kg/m²   TEMPERATURE:  Current - Temp: 98.4 °F (36.9 °C); Max - Temp  Av.4 °F (36.9 °C)  Min: 98.4 °F (36.9 °C)  Max: 98.4 °F (36.9 °C)    Physical Assessment:  General appearance:  Sleeping/unreponsive  Mental Status:  DEON-pt non-verbal  Lungs:  clear to auscultation bilaterally, normal effort  Heart:  regular rate and rhythm, no murmur  Abdomen:  soft, tender to touch, nondistended, normal bowel sounds, no masses, hepatomegaly, splenomegaly  Extremities:  no edema, redness, tenderness in the calves  Skin:  no gross lesions, rashes, induration    Data Review:  LABS and IMAGING:     CBC  Recent Labs     19   WBC 8.9   HGB 13.6   MCV 90.2   RDW 12.5          ANEMIA STUDIES  No results for input(s): LABIRON, TIBC, FERRITIN, [id number], FOLATE, OCCULTBLD in the last 72 hours. BMP  Recent Labs     19  0536 19  0621    140 140   K 3.9 3.9 3.9    105 104   CO2 20 21 20   BUN 12 12 13   CREATININE 0.46* 0.56 0.60   GLUCOSE 178* 190* 196*   CALCIUM 8.4* 8.6 9.0       LFTS  No results for input(s): ALKPHOS, ALT, AST, BILITOT, BILIDIR, LABALBU in the last 72 hours.     AMYLASE/LIPASE/AMMONIA  Recent Labs     19  0621   AMMONIA 33 101*       Acute Hepatitis Panel   No results found for: HEPBSAG, HEPCAB, HEPBIGM, HEPAIGM    HCV Genotype   No results found for: HEPATITISCGENOTYPE    HCV Quantitative   No results found for: HCVQNT    LIVER WORK

## 2019-08-23 ENCOUNTER — APPOINTMENT (OUTPATIENT)
Dept: GENERAL RADIOLOGY | Age: 59
DRG: 388 | End: 2019-08-23
Attending: INTERNAL MEDICINE
Payer: MEDICARE

## 2019-08-23 LAB
AMMONIA: 30 UMOL/L (ref 11–51)
ANION GAP SERPL CALCULATED.3IONS-SCNC: 15 MMOL/L (ref 9–17)
BUN BLDV-MCNC: 14 MG/DL (ref 6–20)
BUN/CREAT BLD: ABNORMAL (ref 9–20)
CALCIUM SERPL-MCNC: 8.8 MG/DL (ref 8.6–10.4)
CHLORIDE BLD-SCNC: 102 MMOL/L (ref 98–107)
CO2: 20 MMOL/L (ref 20–31)
CREAT SERPL-MCNC: 0.63 MG/DL (ref 0.5–0.9)
CULTURE: ABNORMAL
DIRECT EXAM: ABNORMAL
EKG ATRIAL RATE: 124 BPM
EKG P AXIS: 67 DEGREES
EKG P-R INTERVAL: 140 MS
EKG Q-T INTERVAL: 296 MS
EKG QRS DURATION: 68 MS
EKG QTC CALCULATION (BAZETT): 425 MS
EKG R AXIS: 43 DEGREES
EKG T AXIS: 18 DEGREES
EKG VENTRICULAR RATE: 124 BPM
GFR AFRICAN AMERICAN: >60 ML/MIN
GFR NON-AFRICAN AMERICAN: >60 ML/MIN
GFR SERPL CREATININE-BSD FRML MDRD: ABNORMAL ML/MIN/{1.73_M2}
GFR SERPL CREATININE-BSD FRML MDRD: ABNORMAL ML/MIN/{1.73_M2}
GLUCOSE BLD-MCNC: 150 MG/DL (ref 65–105)
GLUCOSE BLD-MCNC: 189 MG/DL (ref 65–105)
GLUCOSE BLD-MCNC: 216 MG/DL (ref 65–105)
GLUCOSE BLD-MCNC: 219 MG/DL (ref 65–105)
GLUCOSE BLD-MCNC: 251 MG/DL (ref 70–99)
Lab: ABNORMAL
MAGNESIUM: 2 MG/DL (ref 1.6–2.6)
PHOSPHORUS: 3.8 MG/DL (ref 2.6–4.5)
POTASSIUM SERPL-SCNC: 4 MMOL/L (ref 3.7–5.3)
SODIUM BLD-SCNC: 137 MMOL/L (ref 135–144)
SPECIMEN DESCRIPTION: ABNORMAL

## 2019-08-23 PROCEDURE — 2580000003 HC RX 258: Performed by: INTERNAL MEDICINE

## 2019-08-23 PROCEDURE — 82140 ASSAY OF AMMONIA: CPT

## 2019-08-23 PROCEDURE — 84100 ASSAY OF PHOSPHORUS: CPT

## 2019-08-23 PROCEDURE — 51701 INSERT BLADDER CATHETER: CPT

## 2019-08-23 PROCEDURE — 92610 EVALUATE SWALLOWING FUNCTION: CPT

## 2019-08-23 PROCEDURE — 99232 SBSQ HOSP IP/OBS MODERATE 35: CPT | Performed by: INTERNAL MEDICINE

## 2019-08-23 PROCEDURE — 94761 N-INVAS EAR/PLS OXIMETRY MLT: CPT

## 2019-08-23 PROCEDURE — 51798 US URINE CAPACITY MEASURE: CPT

## 2019-08-23 PROCEDURE — 6370000000 HC RX 637 (ALT 250 FOR IP): Performed by: NURSE PRACTITIONER

## 2019-08-23 PROCEDURE — 6370000000 HC RX 637 (ALT 250 FOR IP): Performed by: INTERNAL MEDICINE

## 2019-08-23 PROCEDURE — 2500000003 HC RX 250 WO HCPCS: Performed by: INTERNAL MEDICINE

## 2019-08-23 PROCEDURE — 74018 RADEX ABDOMEN 1 VIEW: CPT

## 2019-08-23 PROCEDURE — 36415 COLL VENOUS BLD VENIPUNCTURE: CPT

## 2019-08-23 PROCEDURE — 6360000002 HC RX W HCPCS: Performed by: INTERNAL MEDICINE

## 2019-08-23 PROCEDURE — 82947 ASSAY GLUCOSE BLOOD QUANT: CPT

## 2019-08-23 PROCEDURE — 93010 ELECTROCARDIOGRAM REPORT: CPT | Performed by: INTERNAL MEDICINE

## 2019-08-23 PROCEDURE — 99232 SBSQ HOSP IP/OBS MODERATE 35: CPT | Performed by: NURSE PRACTITIONER

## 2019-08-23 PROCEDURE — 93005 ELECTROCARDIOGRAM TRACING: CPT | Performed by: INTERNAL MEDICINE

## 2019-08-23 PROCEDURE — 80048 BASIC METABOLIC PNL TOTAL CA: CPT

## 2019-08-23 PROCEDURE — 2060000000 HC ICU INTERMEDIATE R&B

## 2019-08-23 PROCEDURE — 83735 ASSAY OF MAGNESIUM: CPT

## 2019-08-23 RX ORDER — MORPHINE SULFATE 2 MG/ML
2 INJECTION, SOLUTION INTRAMUSCULAR; INTRAVENOUS ONCE
Status: DISCONTINUED | OUTPATIENT
Start: 2019-08-23 | End: 2019-08-29 | Stop reason: HOSPADM

## 2019-08-23 RX ORDER — LACTULOSE 10 G/15ML
10 SOLUTION ORAL 3 TIMES DAILY
Status: DISCONTINUED | OUTPATIENT
Start: 2019-08-23 | End: 2019-08-29 | Stop reason: HOSPADM

## 2019-08-23 RX ORDER — INSULIN GLARGINE 100 [IU]/ML
20 INJECTION, SOLUTION SUBCUTANEOUS NIGHTLY
Status: DISCONTINUED | OUTPATIENT
Start: 2019-08-23 | End: 2019-08-29 | Stop reason: HOSPADM

## 2019-08-23 RX ADMIN — ENOXAPARIN SODIUM 40 MG: 40 INJECTION SUBCUTANEOUS at 09:35

## 2019-08-23 RX ADMIN — INSULIN GLARGINE 20 UNITS: 100 INJECTION, SOLUTION SUBCUTANEOUS at 20:46

## 2019-08-23 RX ADMIN — INSULIN LISPRO 4 UNITS: 100 INJECTION, SOLUTION INTRAVENOUS; SUBCUTANEOUS at 09:40

## 2019-08-23 RX ADMIN — METOPROLOL TARTRATE 25 MG: 25 TABLET ORAL at 09:39

## 2019-08-23 RX ADMIN — INSULIN LISPRO 2 UNITS: 100 INJECTION, SOLUTION INTRAVENOUS; SUBCUTANEOUS at 15:35

## 2019-08-23 RX ADMIN — LACTULOSE 10 G: 20 SOLUTION ORAL at 09:37

## 2019-08-23 RX ADMIN — POLYETHYLENE GLYCOL 3350 17 G: 17 POWDER, FOR SOLUTION ORAL at 09:36

## 2019-08-23 RX ADMIN — OLANZAPINE 5 MG: 10 INJECTION, POWDER, LYOPHILIZED, FOR SOLUTION INTRAMUSCULAR at 17:47

## 2019-08-23 RX ADMIN — WATER 2.1 ML: 1 INJECTION INTRAMUSCULAR; INTRAVENOUS; SUBCUTANEOUS at 17:48

## 2019-08-23 RX ADMIN — SENNOSIDES 17.2 MG: 8.6 TABLET, FILM COATED ORAL at 09:38

## 2019-08-23 RX ADMIN — INSULIN LISPRO 2 UNITS: 100 INJECTION, SOLUTION INTRAVENOUS; SUBCUTANEOUS at 20:47

## 2019-08-23 RX ADMIN — RISPERIDONE 1 MG: 1 TABLET, FILM COATED ORAL at 09:39

## 2019-08-23 ASSESSMENT — PAIN SCALES - WONG BAKER

## 2019-08-23 ASSESSMENT — PAIN SCALES - GENERAL
PAINLEVEL_OUTOF10: 1
PAINLEVEL_OUTOF10: 0
PAINLEVEL_OUTOF10: 0

## 2019-08-23 NOTE — PROGRESS NOTES
getting more distended again. Review of Systems:     Cannot be obtained due to altered mental status    Medications: Allergies: Allergies   Allergen Reactions    Bicillin [Penicillin G Benzathine] Shortness Of Breath    Geodon [Ziprasidone]      \"I can't sit still\"       Current Meds:   Scheduled Meds:    insulin glargine  20 Units Subcutaneous Nightly    metoprolol tartrate  25 mg Oral BID    lactulose  10 g Oral TID    morphine  2 mg Intravenous Once    risperiDONE  1 mg Oral Daily    polyethylene glycol  17 g Oral Daily    senna  2 tablet Oral BID    OLANZapine  5 mg Intramuscular QPM    And    sterile water  2.1 mL Intramuscular QPM    [Held by provider] traZODone  100 mg Oral Nightly    [Held by provider] atorvastatin  20 mg Oral Daily    sodium chloride flush  10 mL Intravenous 2 times per day    enoxaparin  40 mg Subcutaneous Daily    insulin lispro  0-12 Units Subcutaneous Q6H     Continuous Infusions:    dextrose       PRN Meds: promethazine, hydrALAZINE, diphenhydrAMINE, glucose, dextrose, glucagon (rDNA), dextrose, sodium chloride flush, magnesium sulfate, magnesium hydroxide, ondansetron, nicotine, acetaminophen, atropine    Data:     Past Medical History:   has a past medical history of Depression, Diabetes mellitus ([address] and Thyroid disease. Social History:   reports that she quit smoking about 5 months ago. Her smoking use included cigarettes. She has a 40.00 pack-year smoking history. She has never used smokeless tobacco. She reports that she does not drink alcohol or use drugs.      Family History:   Family History   Problem Relation Age of Onset    Diabetes Mother     Heart Disease Mother     Stroke Brother        Vitals:  /80   Pulse 146   Temp 98.9 °F (37.2 °C) (Tympanic)   Resp 20   Ht 5' 5\" (1.651 m)   Wt 186 lb 4.6 oz (84.5 kg)   SpO2 93%   BMI 31.00 kg/m²   Temp (24hrs), Av.3 °F (36.8 °C), Min:97.4 °F (36.3 °C), Max:98.9 °F (37.2 °C)    Recent excluded. Surgical consultation and lactate level evaluation is recommended. *Hepatic steatosis. The findings were sent to the Radiology Results Communication [facility] at 12:46 pm on 8/10/2019to be communicated to a licensed caregiver. Xr Abdomen (kub) (single Ap View)    Result Date: 8/13/2019  1. Enteric tube appears unchanged in position. 2. Nonspecific bowel gas pattern. 3. Scattered stool in the colon. Xr Abdomen (kub) (single Ap View)    Result Date: 8/12/2019  Interval placement of enteric tube in the distal stomach and colonic decompression. No dilated loops of bowel identified. Xr Abdomen (kub) (single Ap View)    Result Date: 8/12/2019  Persistent gaseous distention of the colon, with slight increased in degree of distention of the cecum. Has cecal volvulus been considered? Xr Abdomen (kub) (single Ap View)    Result Date: 8/11/2019  Slight reduction in left colonic diameter. The cecum remains fairly distended. No pneumatosis or free air. Xr Abdomen (kub) (single Ap View)    Result Date: 8/10/2019  Distal aspect of the patient's NG tube appears to be within the stomach. The side port appears to be at the GE junction. This could be advanced approximately 3-4 cm for optimal placement. RECOMMENDATION: Findings were discussed with Saranya LUZ at 2:44 pm on 8/10/2019. Xr Acute Abd Series Chest 1 Vw    Result Date: 8/10/2019  Dilation of multiple bowel loops in the abdomen and pelvis. It is unclear whether this represents severe ileus or bowel obstruction. No evidence of free air to suggest perforation. RECOMMENDATION: Abdominal CT for further evaluation. Ct Head Wo Contrast    Result Date: 8/10/2019  No acute intracranial abnormality. Xr Chest Portable    Result Date: 8/9/2019  No change from prior study. Top-normal central vascularity. Unchanged left basilar opacity.      Xr Chest Portable    Result Date: 8/8/2019  Mild central congestion with left lower lung opacity, stable from prior exam.       Physical Examination:        General appearance: More awake and following commands  Mental Status: cannot be assessed due to mentation  Lungs:  clear to auscultation bilaterally, normal effort  Heart:  regular rate and rhythm, no murmur  Abdomen:  Soft, tender, more distended today  extremities:  no edema, redness, tenderness in the calves, right and left upper extremity swelling  Skin:  no gross lesions, rashes, induration    Assessment:        Hospital Problems           Last Modified POA    * (Principal) Toxic metabolic encephalopathy 8/59/3121 Yes    Pseudo-obstruction of colon 8/11/2019 Yes    Bacterial pneumonia 8/11/2019 Yes    Controlled type 2 diabetes mellitus without complication, with long-term current use of insulin ([facility] 8/11/2019 Yes    Dyslipidemia 8/11/2019 Yes    Other specified hypothyroidism 8/11/2019 Yes    Schizophrenia ([facility] 8/11/2019 Yes    Moderate malnutrition ([facility] 8/16/2019 No          Plan:        1. Status post 7-day course of cefepime for pneumonia also received Levaquin  2. Neurology input noted and appreciated status post lumbar puncture, MRI unremarkable   3. Continue risperidone and Zyprexa, clinically her mentation is better   4. Lactulose dose was decreased,   5. General surgery input noted, they have recommended tube feeding with MiraLAX, patient tolerated tube feeds however today her abdomen is more distended, plan to check KUB, GI following, might need decompression again  6. On intermittent catheterization, urology input noted  7. Case management to work on discharge planning this patient is more awake,  8. Get speech therapy evaluation to assess swallowing and possibly remove NG tube if KUB is okay  9.  Blood sugar is elevated and increase the dose of Lantus since patient is on tube feeding  Venous ultrasound of the right upper extremity showed superficial vein thrombosis, Mendel Loveless, MD  8/23/2019  1:35 PM

## 2019-08-23 NOTE — PROGRESS NOTES
& PLAN: 62 y.o.  female admitted with  Metabolic encephalopathy in the setting of pneumonia & colonic pseudo-obstruction s/p colonic decompressions (8/12; 8/16/19). MRI brain - negative; EEG - normal; CSF study - insignificant     Acute superficial DVT in R cephalic vein; will defer it to the primary team    C/O sharp cramps in lower abdomen (8/23); GI on board    Hyperammonemia; 101 (8/22); started on lactulose. NH3 30 (8/23)    Pneumonia; done with Levaquin    H/O schizophrenia, depression; she was on multiple psych medications at home, including Cogentin, Trazodone & Zyprexa; been re-started on Zyprexa 5 mg IM HS (8/18) & Risperdal 1 mg QD PO (8/21). Psychiatry team saw the pt through telepsych. Pt needs to F/U with [facility]     Continue PT/OT    Comorbid conditions - DM, hypothyroidism     Pt with waxing & waning mentation with gradual improvement. Today, she didn't open her eyes on verbal & tactile stimuli; with sternal rub, she got annoyed & stated that she wanted to be left alone as she was \"sleeping\". Per RN, pt was wide awake, alert, oriented & conversing this morning. She sat up & was co-operated with the [address] [place] [address] staff for swallowing study today (8/23). No further neurological testing is recommended at this time    We will sign off at this time. Please, call with any questions or concerns.  Thank you

## 2019-08-23 NOTE — PROGRESS NOTES
Speech Language Pathology  Facility/Department: [facility] 4B STEPDOWN   CLINICAL BEDSIDE SWALLOW EVALUATION    NAME: Danie Cho  : 1960  MRN: [id number]    ADMISSION DATE: 2019  ADMITTING DIAGNOSIS: has Toxic metabolic encephalopathy; Pseudo-obstruction of colon; Febrile illness; Bacterial pneumonia; Controlled type 2 diabetes mellitus without complication, with long-term current use of insulin ([address] Dyslipidemia; Other specified hypothyroidism; Schizophrenia ([address] and Moderate malnutrition ([address] on their problem list.      Date of Eval: 2019  Evaluating Therapist: Rena Coffey    Current Diet level:  Current Diet : NPO  Current Liquid Diet : NPO      Primary Complaint: The patient is a 62 y.o. female  who presented to hospital on  after being found down by a  at home. At initial evaluation she was found to have fever, mental status changes and gait instability according to records. She was admitted to the medical service and there was concern she had stopped taking some of her psychiatric medications. She has since been started on cogentin and risperdal but despite this it seems the patients mental status has not significantly changed since her admission. Per report from nursing staff pt has been non verbal for much of her admission. She has not been eating or drinking since admission. Unfortunately, the patient is not able to give any history at this time. Starting today she was noted to have abdominal distention and Acute abd series was obtained that showed dilation of multiple loops of bowel as swell as colonic distention. She had a subsequent CT without contrast that again shows colonic distention with air throughout the colon down to and including the rectum with some fecal material present. There is no clear evidence of obstructing mass. There is some concern for possible pneumatosis but no portal gas is seen.   On report from nursing it is unclear when pt had

## 2019-08-23 NOTE — PROGRESS NOTES
THE [facility] Progress Note    George Aguilar is a 62 y.o. female patient. Hospitalization Day:12      Chief consult reason:   Colonic obstruction    Subjective:  Pt seen and examined. Upon entering her room, pt was crying, drawing up her legs and c/o severe sharp crampy pain in lower abdomen. Pt is a/ox 3 and verbal this am  TF on at 60 ml/hr. VITALS:  /80   Pulse 146   Temp 98.9 °F (37.2 °C) (Tympanic)   Resp 20   Ht 5' 5\" (1.651 m)   Wt 186 lb 4.6 oz (84.5 kg)   SpO2 93%   BMI 31.00 kg/m²   TEMPERATURE:  Current - Temp: 98.9 °F (37.2 °C); Max - Temp  Av.3 °F (36.8 °C)  Min: 97.4 °F (36.3 °C)  Max: 98.9 °F (37.2 °C)    Physical Assessment:  General appearance:  Awake and verbal  Mental Status:  A/O x 3  Lungs:  clear to auscultation bilaterally, normal effort  Heart:  regular rate and rhythm, no murmur  Abdomen:  soft, tender to touch, distended in RUQ, normal bowel sounds, no masses, hepatomegaly, splenomegaly  Extremities:  no edema, redness, tenderness in the calves  Skin:  no gross lesions, rashes, induration    Data Review:  LABS and IMAGING:     CBC  Recent Labs     19   WBC 8.9   HGB 13.6   MCV 90.2   RDW 12.5          ANEMIA STUDIES  No results for input(s): LABIRON, TIBC, FERRITIN, YCMSVHKF76, FOLATE, OCCULTBLD in the last 72 hours. BMP  Recent Labs     19  0536 19  0621 19  0618    140 137   K 3.9 3.9 4.0    104 102   CO2 21 20 20   BUN 12 13 14   CREATININE 0.56 0.60 0.63   GLUCOSE 190* 196* 251*   CALCIUM 8.6 9.0 8.8       LFTS  No results for input(s): ALKPHOS, ALT, AST, BILITOT, BILIDIR, LABALBU in the last 72 hours.     AMYLASE/LIPASE/AMMONIA  Recent Labs     19  0621 19  0618   AMMONIA 33 101* 30       Acute Hepatitis Panel   No results found for: HEPBSAG, HEPCAB, HEPBIGM, HEPAIGM    HCV Genotype   No results found for: HEPATITISCGENOTYPE    HCV Quantitative   No results found for:

## 2019-08-24 ENCOUNTER — APPOINTMENT (OUTPATIENT)
Dept: GENERAL RADIOLOGY | Age: 59
DRG: 388 | End: 2019-08-24
Attending: INTERNAL MEDICINE
Payer: MEDICARE

## 2019-08-24 LAB
AMMONIA: 29 UMOL/L (ref 11–51)
ANION GAP SERPL CALCULATED.3IONS-SCNC: 17 MMOL/L (ref 9–17)
BUN BLDV-MCNC: 13 MG/DL (ref 6–20)
BUN/CREAT BLD: ABNORMAL (ref 9–20)
CALCIUM SERPL-MCNC: 9 MG/DL (ref 8.6–10.4)
CHLORIDE BLD-SCNC: 100 MMOL/L (ref 98–107)
CO2: 28 MMOL/L (ref 20–31)
CREAT SERPL-MCNC: 0.64 MG/DL (ref 0.5–0.9)
GFR AFRICAN AMERICAN: >60 ML/MIN
GFR NON-AFRICAN AMERICAN: >60 ML/MIN
GFR SERPL CREATININE-BSD FRML MDRD: ABNORMAL ML/MIN/{1.73_M2}
GFR SERPL CREATININE-BSD FRML MDRD: ABNORMAL ML/MIN/{1.73_M2}
GLUCOSE BLD-MCNC: 130 MG/DL (ref 65–105)
GLUCOSE BLD-MCNC: 148 MG/DL (ref 65–105)
GLUCOSE BLD-MCNC: 148 MG/DL (ref 70–99)
GLUCOSE BLD-MCNC: 159 MG/DL (ref 65–105)
GLUCOSE BLD-MCNC: 166 MG/DL (ref 65–105)
GLUCOSE BLD-MCNC: 168 MG/DL (ref 65–105)
GLUCOSE BLD-MCNC: 175 MG/DL (ref 65–105)
POTASSIUM SERPL-SCNC: 3.6 MMOL/L (ref 3.7–5.3)
SODIUM BLD-SCNC: 145 MMOL/L (ref 135–144)

## 2019-08-24 PROCEDURE — 99232 SBSQ HOSP IP/OBS MODERATE 35: CPT | Performed by: INTERNAL MEDICINE

## 2019-08-24 PROCEDURE — 94761 N-INVAS EAR/PLS OXIMETRY MLT: CPT

## 2019-08-24 PROCEDURE — 6370000000 HC RX 637 (ALT 250 FOR IP): Performed by: NURSE PRACTITIONER

## 2019-08-24 PROCEDURE — 6370000000 HC RX 637 (ALT 250 FOR IP): Performed by: STUDENT IN AN ORGANIZED HEALTH CARE EDUCATION/TRAINING PROGRAM

## 2019-08-24 PROCEDURE — 6360000002 HC RX W HCPCS: Performed by: INTERNAL MEDICINE

## 2019-08-24 PROCEDURE — 36415 COLL VENOUS BLD VENIPUNCTURE: CPT

## 2019-08-24 PROCEDURE — 2060000000 HC ICU INTERMEDIATE R&B

## 2019-08-24 PROCEDURE — 6370000000 HC RX 637 (ALT 250 FOR IP): Performed by: INTERNAL MEDICINE

## 2019-08-24 PROCEDURE — 51701 INSERT BLADDER CATHETER: CPT

## 2019-08-24 PROCEDURE — 82947 ASSAY GLUCOSE BLOOD QUANT: CPT

## 2019-08-24 PROCEDURE — 2700000000 HC OXYGEN THERAPY PER DAY

## 2019-08-24 PROCEDURE — 82140 ASSAY OF AMMONIA: CPT

## 2019-08-24 PROCEDURE — 80048 BASIC METABOLIC PNL TOTAL CA: CPT

## 2019-08-24 PROCEDURE — 51798 US URINE CAPACITY MEASURE: CPT

## 2019-08-24 PROCEDURE — 74018 RADEX ABDOMEN 1 VIEW: CPT

## 2019-08-24 PROCEDURE — 2500000003 HC RX 250 WO HCPCS: Performed by: INTERNAL MEDICINE

## 2019-08-24 PROCEDURE — 2580000003 HC RX 258: Performed by: INTERNAL MEDICINE

## 2019-08-24 RX ADMIN — SENNOSIDES 17.2 MG: 8.6 TABLET, FILM COATED ORAL at 20:49

## 2019-08-24 RX ADMIN — METOPROLOL TARTRATE 25 MG: 25 TABLET ORAL at 20:49

## 2019-08-24 RX ADMIN — ENOXAPARIN SODIUM 40 MG: 40 INJECTION SUBCUTANEOUS at 10:01

## 2019-08-24 RX ADMIN — INSULIN GLARGINE 20 UNITS: 100 INJECTION, SOLUTION SUBCUTANEOUS at 21:52

## 2019-08-24 RX ADMIN — WATER 2.1 ML: 1 INJECTION INTRAMUSCULAR; INTRAVENOUS; SUBCUTANEOUS at 18:04

## 2019-08-24 RX ADMIN — RISPERIDONE 1 MG: 1 TABLET, FILM COATED ORAL at 10:01

## 2019-08-24 RX ADMIN — INSULIN LISPRO 2 UNITS: 100 INJECTION, SOLUTION INTRAVENOUS; SUBCUTANEOUS at 10:06

## 2019-08-24 RX ADMIN — INSULIN LISPRO 2 UNITS: 100 INJECTION, SOLUTION INTRAVENOUS; SUBCUTANEOUS at 15:19

## 2019-08-24 RX ADMIN — METOPROLOL TARTRATE 25 MG: 25 TABLET ORAL at 10:00

## 2019-08-24 RX ADMIN — INSULIN LISPRO 2 UNITS: 100 INJECTION, SOLUTION INTRAVENOUS; SUBCUTANEOUS at 21:30

## 2019-08-24 RX ADMIN — POLYETHYLENE GLYCOL 3350, SODIUM SULFATE ANHYDROUS, SODIUM BICARBONATE, SODIUM CHLORIDE, POTASSIUM CHLORIDE 1000 ML: 236; 22.74; 6.74; 5.86; 2.97 POWDER, FOR SOLUTION ORAL at 10:06

## 2019-08-24 RX ADMIN — LACTULOSE 10 G: 20 SOLUTION ORAL at 20:48

## 2019-08-24 RX ADMIN — OLANZAPINE 5 MG: 10 INJECTION, POWDER, LYOPHILIZED, FOR SOLUTION INTRAMUSCULAR at 18:01

## 2019-08-24 RX ADMIN — LACTULOSE 10 G: 20 SOLUTION ORAL at 15:18

## 2019-08-24 ASSESSMENT — PAIN SCALES - WONG BAKER

## 2019-08-24 ASSESSMENT — PAIN SCALES - GENERAL
PAINLEVEL_OUTOF10: 0

## 2019-08-24 NOTE — PROGRESS NOTES
General Surgery:  Daily Progress Note                  PATIENT NAME: Cammy Rick     TODAY'S DATE: 8/24/2019, 6:54 AM    SUBJECTIVE:     Pt seen and examined at bedside. No acute events ovenight. Pt wax and waining with mentation. +abd pain. No bm document. Afebrile. OBJECTIVE:   VITALS:  /76   Pulse 96   Temp 98 °F (36.7 °C) (Oral)   Resp 16   Ht 5' 5\" (1.651 m)   Wt 186 lb 4.6 oz (84.5 kg)   SpO2 93%   BMI 31.00 kg/m²      INTAKE/OUTPUT:      Intake/Output Summary (Last 24 hours) at 8/24/2019 0654  Last data filed at 8/23/2019 1856  Gross per 24 hour   Intake --   Output 1050 ml   Net -1050 ml       PHYSICAL EXAM:  General Appearance:  awake, in no acute distress, not answering questions  HEENT:  Normocephalic, atraumatic, mucus membranes moist   NGT to LIWS  Skin:  Skin color, texture, turgor normal. No rashes or lesions. Lungs:  Normal expansion. Clear to auscultation. No rales, rhonchi, or wheezing. Heart:  Heart sounds are normal.  Regular rate and rhythm without murmur, gallop or rub. Abdomen:  Soft, distended, ttp RUQ and RLQ. Non-peritoneal   Extremities: Extremities warm to touch, pink, with no edema.       Data:  CBC:   Lab Results   Component Value Date    WBC 8.9 08/20/2019    RBC 4.71 08/20/2019    HGB 13.6 08/20/2019    HCT 42.5 08/20/2019    MCV 90.2 08/20/2019    MCH 28.9 08/20/2019    MCHC 32.0 08/20/2019    RDW 12.5 08/20/2019     08/20/2019    MPV 10.0 08/20/2019     CMP:    Lab Results   Component Value Date     08/23/2019    K 4.0 08/23/2019     08/23/2019    CO2 20 08/23/2019    BUN 14 08/23/2019    CREATININE 0.63 08/23/2019    GFRAA >60 08/23/2019    LABGLOM >60 08/23/2019    GLUCOSE 251 08/23/2019    PROT 6.0 08/19/2019    LABALBU 3.1 08/19/2019    CALCIUM 8.8 08/23/2019    BILITOT 0.36 08/19/2019    ALKPHOS 100 08/19/2019    AST 43 08/19/2019     08/19/2019     Radiology Review:    Increased distention of the bowel when compared to the

## 2019-08-24 NOTE — PROGRESS NOTES
PT  Chemistry:  Recent Labs     08/22/19  0621 08/23/19  0618 08/24/19  0611    137 145*   K 3.9 4.0 3.6*    102 100   CO2 20 20 28   GLUCOSE 196* 251* 148*   BUN 13 14 13   CREATININE 0.60 0.63 0.64   MG 2.1 2.0  --    ANIONGAP 16 15 17   LABGLOM >60 >60 >60   GFRAA >60 >60 >60   CALCIUM 9.0 8.8 9.0   PHOS 4.1 3.8  --      Recent Labs     08/22/19  0621  08/23/19  0618 08/23/19  0644 08/23/19  1531 08/23/19  2044 08/24/19  0335 08/24/19  0611 08/24/19  1005 08/24/19  1202   AMMONIA 101*  --  30  --   --   --   --  29  --   --    POCGLU  --    < >  --  219* 189* 150* 130*  --  166* 159*    < > = values in this interval not displayed. ABG:  Lab Results   Component Value Date    POCPH 7.479 08/20/2019    POCPCO2 33.0 08/20/2019    POCPO2 83.9 08/20/2019    POCHCO3 24.5 08/20/2019    NBEA NOT REPORTED 08/20/2019    PBEA 2 08/20/2019    FEC7CNV 26 08/20/2019    RGAM6DLR 97 08/20/2019    FIO2 NOT REPORTED 08/20/2019     Lab Results   Component Value Date/Time    SPECIAL NOT REPORTED 08/21/2019 12:35 AM     Lab Results   Component Value Date/Time    CULTURE NO GROWTH 08/21/2019 12:35 AM       Radiology:  Liseth Lio Abdomen (kub) (single Ap View)    Result Date: 8/24/2019  Diminishing volume of gas in the colon with a cecal diameter of 8.8 cm. Xr Abdomen (kub) (single Ap View)    Result Date: 8/23/2019  Increased distention of the bowel when compared to the prior study. The cecum is dilated and measures approximately 13.7 cm. This could represent ileus or obstruction. Continued follow-up is suggested. Xr Abdomen (kub) (single Ap View)    Result Date: 8/21/2019  1. Enteric tube in stable position within the stomach. 2. The bowel gas pattern appears nonobstructive, without evidence of free air. Xr Abdomen (kub) (single Ap View)    Result Date: 8/20/2019  Enteric contrast within the colon. No dilated bowel. Enteric tube in the expected location of the stomach.      Xr Abdomen (kub) (single Ap View)    Result Date: 8/18/2019  1. Residual contrast throughout the visualized colon. Colonic and cecal distention has decreased/markedly improved since the prior study. No abnormally dilated small bowel loops identified. 2. Nasogastric tube distal tip projecting over the medial mid abdomen likely in the body of the stomach. Ir Lumbar Puncture For Diagnosis    Result Date: 8/20/2019  Successful fluoroscopic-guided lumbar puncture. Mri Brain Wo Contrast    Result Date: 8/20/2019  No acute stroke, intracranial hemorrhage or mass effect. Mild paranasal sinus disease.        Physical Examination:        General appearance:  alert, intermittently responsive, no acute distress  Mental Status:  Unable to assess - altered   Lungs:  clear to auscultation bilaterally, normal effort  Heart:  regular rate and rhythm  Abdomen:  soft, nontender, distended, normal bowel sounds  Extremities:  no edema, redness, tenderness in the calves  Skin:  no gross lesions, rashes, induration    Assessment:        Hospital Problems           Last Modified POA    * (Principal) Toxic metabolic encephalopathy 9/90/1234 Yes    Pseudo-obstruction of colon 8/11/2019 Yes    Bacterial pneumonia 8/11/2019 Yes    Controlled type 2 diabetes mellitus without complication, with long-term current use of insulin ([address] 8/11/2019 Yes    Dyslipidemia 8/11/2019 Yes    Other specified hypothyroidism 8/11/2019 Yes    Schizophrenia ([address] 8/11/2019 Yes    Moderate malnutrition ([address] 8/16/2019 No          Plan:        - Colorectal surgery following - colonoscopy held, plans for possible OR next week   - Neurology consulted - MRI negative, EEG normal, CSF normal   - Psych consulted - continue cogentin, risperdal, zyprexa - hold trazodone, benadryl   - GI consulted - s/p colonic decompression x 2 (8/12, 8/16) - further management per surgery   - Urology consulted - CIC after voiding, check PVR  - Labs and medications reviewed  - Continue lantus 20 nightly,

## 2019-08-24 NOTE — PLAN OF CARE
GI Update:    GI will s/o. Further care is referred to Dr. Justyna Khan team.  If we can be of further assistance, please call.     Leeroy Ang CNP with Dr. Verner Flor, MD  [phone]

## 2019-08-25 LAB
AMMONIA: 23 UMOL/L (ref 11–51)
ANION GAP SERPL CALCULATED.3IONS-SCNC: 13 MMOL/L (ref 9–17)
BUN BLDV-MCNC: 15 MG/DL (ref 6–20)
BUN/CREAT BLD: ABNORMAL (ref 9–20)
CALCIUM SERPL-MCNC: 9 MG/DL (ref 8.6–10.4)
CHLORIDE BLD-SCNC: 99 MMOL/L (ref 98–107)
CO2: 32 MMOL/L (ref 20–31)
CREAT SERPL-MCNC: 0.73 MG/DL (ref 0.5–0.9)
GFR AFRICAN AMERICAN: >60 ML/MIN
GFR NON-AFRICAN AMERICAN: >60 ML/MIN
GFR SERPL CREATININE-BSD FRML MDRD: ABNORMAL ML/MIN/{1.73_M2}
GFR SERPL CREATININE-BSD FRML MDRD: ABNORMAL ML/MIN/{1.73_M2}
GLUCOSE BLD-MCNC: 126 MG/DL (ref 65–105)
GLUCOSE BLD-MCNC: 127 MG/DL (ref 65–105)
GLUCOSE BLD-MCNC: 128 MG/DL (ref 65–105)
GLUCOSE BLD-MCNC: 136 MG/DL (ref 70–99)
GLUCOSE BLD-MCNC: 142 MG/DL (ref 65–105)
GLUCOSE BLD-MCNC: 162 MG/DL (ref 65–105)
POTASSIUM SERPL-SCNC: 3.7 MMOL/L (ref 3.7–5.3)
SODIUM BLD-SCNC: 144 MMOL/L (ref 135–144)

## 2019-08-25 PROCEDURE — 93005 ELECTROCARDIOGRAM TRACING: CPT | Performed by: FAMILY MEDICINE

## 2019-08-25 PROCEDURE — 2580000003 HC RX 258: Performed by: INTERNAL MEDICINE

## 2019-08-25 PROCEDURE — 6370000000 HC RX 637 (ALT 250 FOR IP): Performed by: NURSE PRACTITIONER

## 2019-08-25 PROCEDURE — 6370000000 HC RX 637 (ALT 250 FOR IP): Performed by: STUDENT IN AN ORGANIZED HEALTH CARE EDUCATION/TRAINING PROGRAM

## 2019-08-25 PROCEDURE — 2700000000 HC OXYGEN THERAPY PER DAY

## 2019-08-25 PROCEDURE — 51701 INSERT BLADDER CATHETER: CPT

## 2019-08-25 PROCEDURE — 6370000000 HC RX 637 (ALT 250 FOR IP): Performed by: INTERNAL MEDICINE

## 2019-08-25 PROCEDURE — 2500000003 HC RX 250 WO HCPCS: Performed by: INTERNAL MEDICINE

## 2019-08-25 PROCEDURE — 51798 US URINE CAPACITY MEASURE: CPT

## 2019-08-25 PROCEDURE — 36415 COLL VENOUS BLD VENIPUNCTURE: CPT

## 2019-08-25 PROCEDURE — 97166 OT EVAL MOD COMPLEX 45 MIN: CPT

## 2019-08-25 PROCEDURE — 82947 ASSAY GLUCOSE BLOOD QUANT: CPT

## 2019-08-25 PROCEDURE — 97535 SELF CARE MNGMENT TRAINING: CPT

## 2019-08-25 PROCEDURE — 6360000002 HC RX W HCPCS: Performed by: INTERNAL MEDICINE

## 2019-08-25 PROCEDURE — 82140 ASSAY OF AMMONIA: CPT

## 2019-08-25 PROCEDURE — 2060000000 HC ICU INTERMEDIATE R&B

## 2019-08-25 PROCEDURE — 94761 N-INVAS EAR/PLS OXIMETRY MLT: CPT

## 2019-08-25 PROCEDURE — 80048 BASIC METABOLIC PNL TOTAL CA: CPT

## 2019-08-25 PROCEDURE — 97530 THERAPEUTIC ACTIVITIES: CPT

## 2019-08-25 PROCEDURE — 2580000003 HC RX 258: Performed by: NURSE PRACTITIONER

## 2019-08-25 PROCEDURE — 99232 SBSQ HOSP IP/OBS MODERATE 35: CPT | Performed by: INTERNAL MEDICINE

## 2019-08-25 RX ORDER — SODIUM CHLORIDE 9 MG/ML
INJECTION, SOLUTION INTRAVENOUS CONTINUOUS
Status: DISCONTINUED | OUTPATIENT
Start: 2019-08-25 | End: 2019-08-29 | Stop reason: HOSPADM

## 2019-08-25 RX ORDER — POLYETHYLENE GLYCOL 3350 17 G/17G
17 POWDER, FOR SOLUTION ORAL 2 TIMES DAILY
Status: DISCONTINUED | OUTPATIENT
Start: 2019-08-25 | End: 2019-08-29 | Stop reason: HOSPADM

## 2019-08-25 RX ORDER — 0.9 % SODIUM CHLORIDE 0.9 %
1000 INTRAVENOUS SOLUTION INTRAVENOUS ONCE
Status: COMPLETED | OUTPATIENT
Start: 2019-08-25 | End: 2019-08-26

## 2019-08-25 RX ADMIN — POLYETHYLENE GLYCOL 3350 17 G: 17 POWDER, FOR SOLUTION ORAL at 09:19

## 2019-08-25 RX ADMIN — SODIUM CHLORIDE, PRESERVATIVE FREE 10 ML: 5 INJECTION INTRAVENOUS at 21:37

## 2019-08-25 RX ADMIN — OLANZAPINE 5 MG: 10 INJECTION, POWDER, LYOPHILIZED, FOR SOLUTION INTRAMUSCULAR at 17:38

## 2019-08-25 RX ADMIN — SENNOSIDES 17.2 MG: 8.6 TABLET, FILM COATED ORAL at 09:20

## 2019-08-25 RX ADMIN — ENOXAPARIN SODIUM 40 MG: 40 INJECTION SUBCUTANEOUS at 09:19

## 2019-08-25 RX ADMIN — WATER 2.1 ML: 1 INJECTION INTRAMUSCULAR; INTRAVENOUS; SUBCUTANEOUS at 17:39

## 2019-08-25 RX ADMIN — SODIUM CHLORIDE: 9 INJECTION, SOLUTION INTRAVENOUS at 14:30

## 2019-08-25 RX ADMIN — SENNOSIDES 17.2 MG: 8.6 TABLET, FILM COATED ORAL at 21:37

## 2019-08-25 RX ADMIN — SODIUM CHLORIDE 1000 ML: 9 INJECTION, SOLUTION INTRAVENOUS at 23:00

## 2019-08-25 RX ADMIN — LACTULOSE 10 G: 20 SOLUTION ORAL at 09:19

## 2019-08-25 RX ADMIN — SODIUM CHLORIDE, PRESERVATIVE FREE 10 ML: 5 INJECTION INTRAVENOUS at 09:21

## 2019-08-25 RX ADMIN — METOPROLOL TARTRATE 25 MG: 25 TABLET ORAL at 09:19

## 2019-08-25 RX ADMIN — METOPROLOL TARTRATE 25 MG: 25 TABLET ORAL at 21:37

## 2019-08-25 RX ADMIN — RISPERIDONE 1 MG: 1 TABLET, FILM COATED ORAL at 09:18

## 2019-08-25 RX ADMIN — LACTULOSE 10 G: 20 SOLUTION ORAL at 21:37

## 2019-08-25 RX ADMIN — POLYETHYLENE GLYCOL 3350 17 G: 17 POWDER, FOR SOLUTION ORAL at 23:11

## 2019-08-25 RX ADMIN — LACTULOSE 10 G: 20 SOLUTION ORAL at 14:30

## 2019-08-25 ASSESSMENT — PAIN SCALES - WONG BAKER

## 2019-08-25 ASSESSMENT — PAIN SCALES - GENERAL
PAINLEVEL_OUTOF10: 10
PAINLEVEL_OUTOF10: 10

## 2019-08-25 ASSESSMENT — PAIN DESCRIPTION - PAIN TYPE
TYPE: ACUTE PAIN
TYPE: ACUTE PAIN

## 2019-08-25 ASSESSMENT — PAIN DESCRIPTION - DESCRIPTORS
DESCRIPTORS: ACHING
DESCRIPTORS: ACHING;TENDER;SORE

## 2019-08-25 ASSESSMENT — PAIN - FUNCTIONAL ASSESSMENT: PAIN_FUNCTIONAL_ASSESSMENT: PREVENTS OR INTERFERES SOME ACTIVE ACTIVITIES AND ADLS

## 2019-08-25 ASSESSMENT — PAIN DESCRIPTION - LOCATION
LOCATION: THROAT
LOCATION: THROAT

## 2019-08-25 ASSESSMENT — PAIN DESCRIPTION - FREQUENCY: FREQUENCY: CONTINUOUS

## 2019-08-25 NOTE — PLAN OF CARE
Problem: Falls - Risk of:  Goal: Will remain free from falls  Description  Will remain free from falls  Outcome: Ongoing  Goal: Absence of physical injury  Description  Absence of physical injury  Outcome: Ongoing     Problem: Risk for Impaired Skin Integrity  Goal: Tissue integrity - skin and mucous membranes  Description  Structural intactness and normal physiological function of skin and  mucous membranes.   Outcome: Ongoing     Problem: Pain:  Goal: Pain level will decrease  Description  Pain level will decrease  Outcome: Ongoing  Goal: Control of acute pain  Description  Control of acute pain  Outcome: Ongoing  Goal: Control of chronic pain  Description  Control of chronic pain  Outcome: Ongoing     Problem: Nutrition  Goal: Optimal nutrition therapy  Outcome: Ongoing

## 2019-08-25 NOTE — PROGRESS NOTES
[address]    Progress Note    8/25/2019    12:33 PM    Name:   Kimmy Pena  MRN:     [id number]     [place]:      [de-identified]   Room:   [address] [place] [address]  IP Day:  15  Admit Date:  8/11/2019  2:33 PM    PCP:   DESIREE Fenton CNP  Code Status:  Full Code    Subjective:     C/C: AMS    Interval History Status: improved. No acute issues overnight  Mental status improved this AM  Oriented to place and time  Has episodes of waxing/waning per RN  Plans for possible OR later this week per surgery  No complaints     Brief History:     51-year-old F who was admitted to [facility] on August 6 with a complaints of altered mental status. She was found to have pneumonia and was treated with IV Maxipime.  During her hospital stay she has developed an [place] syndrome was evaluated by surgery.  As her mentation was not improving and has felt her [place] syndrome was related to her psychiatric medications she was transferred to [facility] for psychiatric evaluation and further management.      Surgery/GI consulted. Given neostigmine and underwent urgent colonoscopy for bowel decompression. Neurology consulted for AMS, EEG unremarkable. Cultures showed no growth at 5 days. IV cefepime was discontinued due to possible neurological side effects.      She was evaluated by GI and she underwent colonoscopy 2 times for decompression.  General surgery and colorectal surgery also evaluated the patient. John Murillo was evaluated by neurology as well and underwent lumbar puncture and MRI.     Review of Systems:     Constitutional:  negative for chills, fevers, sweats  Respiratory:  negative for cough, dyspnea on exertion, hemoptysis, shortness of breath, wheezing  Cardiovascular:  negative for chest pain, chest pressure/discomfort  Gastrointestinal:  negative for abdominal pain, nausea, vomiting  Neurological:  negative for dizziness, headache    Medications: Date: 8/21/2019  1. Enteric tube in stable position within the stomach. 2. The bowel gas pattern appears nonobstructive, without evidence of free air. Xr Abdomen (kub) (single Ap View)    Result Date: 8/20/2019  Enteric contrast within the colon. No dilated bowel. Enteric tube in the expected location of the stomach. Ir Lumbar Puncture For Diagnosis    Result Date: 8/20/2019  Successful fluoroscopic-guided lumbar puncture. Mri Brain Wo Contrast    Result Date: 8/20/2019  No acute stroke, intracranial hemorrhage or mass effect. Mild paranasal sinus disease.        Physical Examination:        General appearance:  alert, no distress  Mental Status:  oriented to person, place and time   Lungs:  clear to auscultation bilaterally, normal effort  Heart:  regular rate and rhythm  Abdomen:  soft, nontender, distended, normal bowel sounds  Extremities:  no edema, redness, tenderness in the calves  Skin:  no gross lesions, rashes, induration    Assessment:        Hospital Problems           Last Modified POA    * (Principal) Toxic metabolic encephalopathy 2/48/5605 Yes    Pseudo-obstruction of colon 8/11/2019 Yes    Bacterial pneumonia 8/11/2019 Yes    Controlled type 2 diabetes mellitus without complication, with long-term current use of insulin ([address] 8/11/2019 Yes    Dyslipidemia 8/11/2019 Yes    Other specified hypothyroidism 8/11/2019 Yes    Schizophrenia ([address] 8/11/2019 Yes    Moderate malnutrition ([address] 8/16/2019 No          Plan:        - Colorectal surgery following - plans for possible OR this week vs conservative management   - Neurology consulted - MRI negative, EEG normal, CSF normal   - Psych consulted - continue cogentin, risperdal, zyprexa - hold trazodone, benadryl   - GI consulted - s/p colonic decompression x 2 (8/12, 8/16) - further management per surgery   - Urology consulted - CIC after voiding, check PVR  - Labs and medications reviewed  - Continue lantus 20 nightly, insulin correction scale

## 2019-08-25 NOTE — PROGRESS NOTES
(08/25/19 0845)  AM-PAC Inpatient ADL T-Scale Score : 35.96 (08/25/19 0845)  ADL Inpatient CMS 0-100% Score: 53.32 (08/25/19 0845)  ADL Inpatient CMS G-Code Modifier : CK (08/25/19 0845)    Goals  Short term goals  Time Frame for Short term goals: Pt will by discharge   Short term goal 1: demo BUE gross strength of 5/5 for use in ADL completion  Short term goal 2: demo ADL UB/LB dressing/bathing activity seated with SUP and increased time, min vc's  Short term goal 3: demo good safety awareness during func mob around room using RW and CGA  Short term goal 4: demo standing during func activity for 5 min using RW and CGA  Short term goal 5: demo (I) with all bed mob/transfers     Therapy Time   Individual Concurrent Group Co-treatment   Time In 0752         Time Out 0829         Minutes 1215 Arlet Patel, OTR/L

## 2019-08-25 NOTE — PROGRESS NOTES
General Surgery:  Daily Progress Note                  PATIENT NAME: Cammy Rick     TODAY'S DATE: 8/25/2019, 7:35 AM    SUBJECTIVE:     Pt seen and examined at bedside. No acute events ovenight. +2 BM. Afebrile. OBJECTIVE:   VITALS:  /70   Pulse 99   Temp 98.7 °F (37.1 °C) (Axillary)   Resp 15   Ht 5' 5\" (1.651 m)   Wt 186 lb 4.6 oz (84.5 kg)   SpO2 98%   BMI 31.00 kg/m²      INTAKE/OUTPUT:      Intake/Output Summary (Last 24 hours) at 8/25/2019 0735  Last data filed at 8/24/2019 2053  Gross per 24 hour   Intake 1000 ml   Output 1100 ml   Net -100 ml       PHYSICAL EXAM:  General Appearance:  awake, in no acute distress, not answering questions  HEENT:  Normocephalic, atraumatic, mucus membranes moist   NGT to LIWS  Skin:  Skin color, texture, turgor normal. No rashes or lesions. Lungs:  Equal and symmetric chest rise/fall  Heart: s1+2  Abdomen:  Soft, nd, nttp  Extremities: Extremities warm to touch, pink, with no edema. Data:  CBC:   Lab Results   Component Value Date    WBC 8.9 08/20/2019    RBC 4.71 08/20/2019    HGB 13.6 08/20/2019    HCT 42.5 08/20/2019    MCV 90.2 08/20/2019    MCH 28.9 08/20/2019    MCHC 32.0 08/20/2019    RDW 12.5 08/20/2019     08/20/2019    MPV 10.0 08/20/2019     CMP:    Lab Results   Component Value Date     08/25/2019    K 3.7 08/25/2019    CL 99 08/25/2019    CO2 32 08/25/2019    BUN 15 08/25/2019    CREATININE 0.73 08/25/2019    GFRAA >60 08/25/2019    LABGLOM >60 08/25/2019    GLUCOSE 136 08/25/2019    PROT 6.0 08/19/2019    LABALBU 3.1 08/19/2019    CALCIUM 9.0 08/25/2019    BILITOT 0.36 08/19/2019    ALKPHOS 100 08/19/2019    AST 43 08/19/2019     08/19/2019     Radiology Review:    Increased distention of the bowel when compared to the prior study.  The   cecum is dilated and measures approximately 13.7 cm.  This could represent   ileus or obstruction.  Continued follow-up is suggested.        ASSESSMENT:  Active Hospital Problems

## 2019-08-25 NOTE — PROGRESS NOTES
sitting EOB with OT at end of session  Pain Screening  Patient Currently in Pain: Yes  Pain Assessment  Pain Assessment: 0-10  Pain Level: 10  Pain Type: Acute pain  Pain Location: Throat  Pain Descriptors: Aching  Functional Pain Assessment: Prevents or interferes some active activities and ADLs  Non-Pharmaceutical Pain Intervention(s): Emotional support;Distraction  Vital Signs  Patient Currently in Pain: Yes       Orientation  Orientation  Overall Orientation Status: Within Functional Limits  Cognition      Objective   Bed mobility  Supine to Sit: Minimal assistance(cues given for sequencing with fair return, HOB elevated, pt utilized hand rail)  Transfers  Sit to Stand: Minimal Assistance  Stand to sit: Minimal Assistance  Ambulation  Ambulation?: Yes  Ambulation 1  Surface: level tile  Device: Rolling Walker  Assistance: Minimal assistance  Quality of Gait: small sidesteps, narrow CASANDRA, unsteady  Distance: 1ft laterally  Comments: limited by tachycardia (HR increased to 140)- recovered to 120-130 once seated     Balance  Posture: Fair  Sitting - Static: Fair(pt sat EOB 7 minutes performing ADLs, CGA/Jerome to maintain balance; posterior lean at times; denies dizziness)  Sitting - Dynamic: Fair;-  Standing - Static: Fair(stood for ~40'' with Jerome)  Standing - Dynamic: Fair;-(RW for stability)               Goals  Short term goals  Time Frame for Short term goals: 14 visits  Short term goal 1: Pt to follow all commands for active exercise  Short term goal 2: Pt to sit EOB Min A. Short term goal 3: Progess with out of bed activities as appropriate  Patient Goals   Patient goals : Could not state    Plan    Plan  Times per week: 5x/week  Current Treatment Recommendations: Strengthening, Cognitive Reorientation, Functional Mobility Training, Safety Education & Training  Safety Devices  Type of devices:  All fall risk precautions in place, Gait belt, Call light within reach, Nurse notified, Patient at risk for

## 2019-08-26 ENCOUNTER — APPOINTMENT (OUTPATIENT)
Dept: GENERAL RADIOLOGY | Age: 59
DRG: 388 | End: 2019-08-26
Attending: INTERNAL MEDICINE
Payer: MEDICARE

## 2019-08-26 LAB
ALBUMIN SERPL-MCNC: 3.3 G/DL (ref 3.5–5.2)
ALBUMIN/GLOBULIN RATIO: 1.1 (ref 1–2.5)
ALP BLD-CCNC: 129 U/L (ref 35–104)
ALT SERPL-CCNC: 137 U/L (ref 5–33)
ANION GAP SERPL CALCULATED.3IONS-SCNC: 14 MMOL/L (ref 9–17)
AST SERPL-CCNC: 57 U/L
BILIRUB SERPL-MCNC: 0.61 MG/DL (ref 0.3–1.2)
BUN BLDV-MCNC: 16 MG/DL (ref 6–20)
BUN/CREAT BLD: ABNORMAL (ref 9–20)
CALCIUM SERPL-MCNC: 8.8 MG/DL (ref 8.6–10.4)
CHLORIDE BLD-SCNC: 104 MMOL/L (ref 98–107)
CO2: 30 MMOL/L (ref 20–31)
CREAT SERPL-MCNC: 0.72 MG/DL (ref 0.5–0.9)
EKG ATRIAL RATE: 113 BPM
EKG P AXIS: 49 DEGREES
EKG P-R INTERVAL: 138 MS
EKG Q-T INTERVAL: 342 MS
EKG QRS DURATION: 70 MS
EKG QTC CALCULATION (BAZETT): 469 MS
EKG R AXIS: 6 DEGREES
EKG T AXIS: 19 DEGREES
EKG VENTRICULAR RATE: 113 BPM
GFR AFRICAN AMERICAN: >60 ML/MIN
GFR NON-AFRICAN AMERICAN: >60 ML/MIN
GFR SERPL CREATININE-BSD FRML MDRD: ABNORMAL ML/MIN/{1.73_M2}
GFR SERPL CREATININE-BSD FRML MDRD: ABNORMAL ML/MIN/{1.73_M2}
GLUCOSE BLD-MCNC: 113 MG/DL (ref 65–105)
GLUCOSE BLD-MCNC: 114 MG/DL (ref 65–105)
GLUCOSE BLD-MCNC: 114 MG/DL (ref 65–105)
GLUCOSE BLD-MCNC: 128 MG/DL (ref 65–105)
GLUCOSE BLD-MCNC: 129 MG/DL (ref 70–99)
GLUCOSE BLD-MCNC: 132 MG/DL (ref 65–105)
GLUCOSE BLD-MCNC: 138 MG/DL (ref 65–105)
HCT VFR BLD CALC: 39.6 % (ref 36.3–47.1)
HEMOGLOBIN: 12.3 G/DL (ref 11.9–15.1)
MAGNESIUM: 2.1 MG/DL (ref 1.6–2.6)
MCH RBC QN AUTO: 28.5 PG (ref 25.2–33.5)
MCHC RBC AUTO-ENTMCNC: 31.1 G/DL (ref 28.4–34.8)
MCV RBC AUTO: 91.9 FL (ref 82.6–102.9)
NRBC AUTOMATED: 0 PER 100 WBC
PDW BLD-RTO: 13.1 % (ref 11.8–14.4)
PHOSPHORUS: 4.2 MG/DL (ref 2.6–4.5)
PLATELET # BLD: 315 K/UL (ref 138–453)
PMV BLD AUTO: 10.6 FL (ref 8.1–13.5)
POTASSIUM SERPL-SCNC: 3.6 MMOL/L (ref 3.7–5.3)
RBC # BLD: 4.31 M/UL (ref 3.95–5.11)
SODIUM BLD-SCNC: 148 MMOL/L (ref 135–144)
TOTAL PROTEIN: 6.4 G/DL (ref 6.4–8.3)
WBC # BLD: 7.8 K/UL (ref 3.5–11.3)

## 2019-08-26 PROCEDURE — 6370000000 HC RX 637 (ALT 250 FOR IP): Performed by: STUDENT IN AN ORGANIZED HEALTH CARE EDUCATION/TRAINING PROGRAM

## 2019-08-26 PROCEDURE — 2700000000 HC OXYGEN THERAPY PER DAY

## 2019-08-26 PROCEDURE — 2500000003 HC RX 250 WO HCPCS: Performed by: INTERNAL MEDICINE

## 2019-08-26 PROCEDURE — 93010 ELECTROCARDIOGRAM REPORT: CPT | Performed by: INTERNAL MEDICINE

## 2019-08-26 PROCEDURE — 94760 N-INVAS EAR/PLS OXIMETRY 1: CPT

## 2019-08-26 PROCEDURE — 85027 COMPLETE CBC AUTOMATED: CPT

## 2019-08-26 PROCEDURE — 2060000000 HC ICU INTERMEDIATE R&B

## 2019-08-26 PROCEDURE — 2580000003 HC RX 258: Performed by: INTERNAL MEDICINE

## 2019-08-26 PROCEDURE — 6370000000 HC RX 637 (ALT 250 FOR IP): Performed by: INTERNAL MEDICINE

## 2019-08-26 PROCEDURE — 6360000002 HC RX W HCPCS: Performed by: INTERNAL MEDICINE

## 2019-08-26 PROCEDURE — 74018 RADEX ABDOMEN 1 VIEW: CPT

## 2019-08-26 PROCEDURE — 51798 US URINE CAPACITY MEASURE: CPT

## 2019-08-26 PROCEDURE — 80053 COMPREHEN METABOLIC PANEL: CPT

## 2019-08-26 PROCEDURE — 51701 INSERT BLADDER CATHETER: CPT

## 2019-08-26 PROCEDURE — 82947 ASSAY GLUCOSE BLOOD QUANT: CPT

## 2019-08-26 PROCEDURE — 83735 ASSAY OF MAGNESIUM: CPT

## 2019-08-26 PROCEDURE — 99232 SBSQ HOSP IP/OBS MODERATE 35: CPT | Performed by: INTERNAL MEDICINE

## 2019-08-26 PROCEDURE — 84100 ASSAY OF PHOSPHORUS: CPT

## 2019-08-26 PROCEDURE — 6370000000 HC RX 637 (ALT 250 FOR IP): Performed by: NURSE PRACTITIONER

## 2019-08-26 PROCEDURE — 36415 COLL VENOUS BLD VENIPUNCTURE: CPT

## 2019-08-26 RX ADMIN — POLYETHYLENE GLYCOL 3350 17 G: 17 POWDER, FOR SOLUTION ORAL at 20:55

## 2019-08-26 RX ADMIN — SENNOSIDES 17.2 MG: 8.6 TABLET, FILM COATED ORAL at 09:20

## 2019-08-26 RX ADMIN — SODIUM CHLORIDE: 9 INJECTION, SOLUTION INTRAVENOUS at 14:47

## 2019-08-26 RX ADMIN — SODIUM CHLORIDE, PRESERVATIVE FREE 10 ML: 5 INJECTION INTRAVENOUS at 20:57

## 2019-08-26 RX ADMIN — POLYETHYLENE GLYCOL 3350 17 G: 17 POWDER, FOR SOLUTION ORAL at 09:20

## 2019-08-26 RX ADMIN — SENNOSIDES 17.2 MG: 8.6 TABLET, FILM COATED ORAL at 20:56

## 2019-08-26 RX ADMIN — ENOXAPARIN SODIUM 40 MG: 40 INJECTION SUBCUTANEOUS at 09:20

## 2019-08-26 RX ADMIN — SODIUM CHLORIDE, PRESERVATIVE FREE 10 ML: 5 INJECTION INTRAVENOUS at 09:21

## 2019-08-26 RX ADMIN — METOPROLOL TARTRATE 25 MG: 25 TABLET ORAL at 09:20

## 2019-08-26 RX ADMIN — METOPROLOL TARTRATE 25 MG: 25 TABLET ORAL at 20:56

## 2019-08-26 RX ADMIN — WATER 2.1 ML: 1 INJECTION INTRAMUSCULAR; INTRAVENOUS; SUBCUTANEOUS at 18:31

## 2019-08-26 RX ADMIN — LACTULOSE 10 G: 20 SOLUTION ORAL at 13:43

## 2019-08-26 RX ADMIN — LACTULOSE 10 G: 20 SOLUTION ORAL at 20:56

## 2019-08-26 RX ADMIN — LACTULOSE 10 G: 20 SOLUTION ORAL at 09:20

## 2019-08-26 RX ADMIN — RISPERIDONE 1 MG: 1 TABLET, FILM COATED ORAL at 09:20

## 2019-08-26 ASSESSMENT — PAIN SCALES - WONG BAKER

## 2019-08-26 ASSESSMENT — PAIN SCALES - GENERAL
PAINLEVEL_OUTOF10: 0
PAINLEVEL_OUTOF10: 0

## 2019-08-26 NOTE — PROGRESS NOTES
(24Hr): Intake/Output Summary (Last 24 hours) at 8/26/2019 0902  Last data filed at 8/26/2019 0547  Gross per 24 hour   Intake 1469 ml   Output 430 ml   Net 1039 ml       Labs:  Hematology:  Recent Labs     08/26/19  0638   WBC 7.8   RBC 4.31   HGB 12.3   HCT 39.6   MCV 91.9   MCH 28.5   MCHC 31.1   RDW 13.1      MPV 10.6     Chemistry:  Recent Labs     08/24/19  0611 08/25/19  0641 08/26/19  0638   * 144 148*   K 3.6* 3.7 3.6*    99 104   CO2 28 32* 30   GLUCOSE 148* 136* 129*   BUN 13 15 16   CREATININE 0.64 0.73 0.72   MG  --   --  2.1   ANIONGAP 17 13 14   LABGLOM >60 >60 >60   GFRAA >60 >60 >60   CALCIUM 9.0 9.0 8.8   PHOS  --   --  4.2     Recent Labs     08/24/19  0611  08/25/19  0641  08/25/19  1152 08/25/19  1620 08/25/19  1952 08/26/19  0010 08/26/19  0558 08/26/19  0607 08/26/19  0638   PROT  --   --   --   --   --   --   --   --   --   --  6.4   LABALBU  --   --   --   --   --   --   --   --   --   --  3.3*   AST  --   --   --   --   --   --   --   --   --   --  57*   ALT  --   --   --   --   --   --   --   --   --   --  137*   ALKPHOS  --   --   --   --   --   --   --   --   --   --  129*   BILITOT  --   --   --   --   --   --   --   --   --   --  0.61   AMMONIA 29  --  23  --   --   --   --   --   --   --   --    POCGLU  --    < >  --    < > 162* 128* 127* 138* 113* 128*  --     < > = values in this interval not displayed.      ABG:  Lab Results   Component Value Date    POCPH 7.479 08/20/2019    POCPCO2 33.0 08/20/2019    POCPO2 83.9 08/20/2019    POCHCO3 24.5 08/20/2019    NBEA NOT REPORTED 08/20/2019    PBEA 2 08/20/2019    VJU0TGP 26 08/20/2019    QBIX6QFS 97 08/20/2019    FIO2 NOT REPORTED 08/20/2019     Lab Results   Component Value Date/Time    SPECIAL NOT REPORTED 08/21/2019 12:35 AM     Lab Results   Component Value Date/Time    CULTURE NO GROWTH 08/21/2019 12:35 AM       Radiology:  Ivonne Medina Abdomen (kub) (single Ap View)    Result Date: 8/25/2019  Increased distention of the

## 2019-08-26 NOTE — PROGRESS NOTES
Surgery resident at bedside updated on NG output and vitals. He said ok for medicine to start tube feeds and they will get a KUB.  Will continue to monitor    Julienne Bamberger, RN

## 2019-08-26 NOTE — PROGRESS NOTES
Nutrition Assessment (Enteral Nutrition)    Type and Reason for Visit: Reassess, Consult(TF Ordering and Management)    Nutrition Recommendations:   - As able, restart TF of Vital AF 1.2 (semi-elemental) slowly advancing to goal rate of 60 mL/hr = 1728 kcal, 108 gm protein per day. Monitor TF tolerance/adequacy of intakes - modify as needed. - Continue NPO - monitor for repeat swallow study and start of oral diet as appropriate. - Will monitor bowel function, labs, and plan of care. Nutrition Assessment: TF stopped on 8/23 d/t increased abdominal cramping. Pt remains nutritionally compromised as evidenced by continued NPO status. RN reports plans for restart of TF today - consulted for TF ordering and management. Noted pt had BM today - remains on bowel regimen. Noted pt with waxing/waning mentation with continued lethargy at times. Malnutrition Assessment:  · Malnutrition Status: Mild malnutrition  · Context: Acute illness or injury  · Findings of the 6 clinical characteristics of malnutrition (Minimum of 2 out of 6 clinical characteristics is required to make the diagnosis of moderate or severe Protein Calorie Malnutrition based on AND/ASPEN Guidelines):  1. Energy Intake-Less than or equal to 75% of estimated energy requirement, Greater than or equal to 7 days    2. Weight Loss-5% loss or greater, in 1 month  3. Fat Loss-No significant subcutaneous fat loss  4. Muscle Loss-No significant muscle mass loss  5. Fluid Accumulation-No significant fluid accumulation    Nutrition Risk Level: High    Nutrition Needs:  · Estimated Daily Total Kcal: 0254-8107 kcal/day  · Estimated Daily Protein (g):  gm pro/day    Nutrition Diagnosis:   · Problem: Inadequate oral intake  · Etiology: related to Alteration in GI function(mentation)     Signs and symptoms:  as evidenced by NPO status due to medical condition    Objective Information:  · Nutrition-Focused Physical Findings: NG in place.   Distended

## 2019-08-27 LAB
ANION GAP SERPL CALCULATED.3IONS-SCNC: 13 MMOL/L (ref 9–17)
BUN BLDV-MCNC: 14 MG/DL (ref 6–20)
BUN/CREAT BLD: ABNORMAL (ref 9–20)
CALCIUM SERPL-MCNC: 8.7 MG/DL (ref 8.6–10.4)
CHLORIDE BLD-SCNC: 107 MMOL/L (ref 98–107)
CO2: 28 MMOL/L (ref 20–31)
CREAT SERPL-MCNC: 0.68 MG/DL (ref 0.5–0.9)
GFR AFRICAN AMERICAN: >60 ML/MIN
GFR NON-AFRICAN AMERICAN: >60 ML/MIN
GFR SERPL CREATININE-BSD FRML MDRD: ABNORMAL ML/MIN/{1.73_M2}
GFR SERPL CREATININE-BSD FRML MDRD: ABNORMAL ML/MIN/{1.73_M2}
GLUCOSE BLD-MCNC: 113 MG/DL (ref 65–105)
GLUCOSE BLD-MCNC: 137 MG/DL (ref 70–99)
GLUCOSE BLD-MCNC: 144 MG/DL (ref 65–105)
GLUCOSE BLD-MCNC: 149 MG/DL (ref 65–105)
GLUCOSE BLD-MCNC: 159 MG/DL (ref 65–105)
MAGNESIUM: 2 MG/DL (ref 1.6–2.6)
POTASSIUM SERPL-SCNC: 3.4 MMOL/L (ref 3.7–5.3)
SODIUM BLD-SCNC: 148 MMOL/L (ref 135–144)

## 2019-08-27 PROCEDURE — 6370000000 HC RX 637 (ALT 250 FOR IP): Performed by: NURSE PRACTITIONER

## 2019-08-27 PROCEDURE — 80048 BASIC METABOLIC PNL TOTAL CA: CPT

## 2019-08-27 PROCEDURE — 2060000000 HC ICU INTERMEDIATE R&B

## 2019-08-27 PROCEDURE — 6370000000 HC RX 637 (ALT 250 FOR IP): Performed by: INTERNAL MEDICINE

## 2019-08-27 PROCEDURE — 2580000003 HC RX 258: Performed by: INTERNAL MEDICINE

## 2019-08-27 PROCEDURE — 51798 US URINE CAPACITY MEASURE: CPT

## 2019-08-27 PROCEDURE — 36415 COLL VENOUS BLD VENIPUNCTURE: CPT

## 2019-08-27 PROCEDURE — 99232 SBSQ HOSP IP/OBS MODERATE 35: CPT | Performed by: INTERNAL MEDICINE

## 2019-08-27 PROCEDURE — 6370000000 HC RX 637 (ALT 250 FOR IP): Performed by: STUDENT IN AN ORGANIZED HEALTH CARE EDUCATION/TRAINING PROGRAM

## 2019-08-27 PROCEDURE — 83735 ASSAY OF MAGNESIUM: CPT

## 2019-08-27 PROCEDURE — 94761 N-INVAS EAR/PLS OXIMETRY MLT: CPT

## 2019-08-27 PROCEDURE — 6360000002 HC RX W HCPCS: Performed by: INTERNAL MEDICINE

## 2019-08-27 PROCEDURE — 97530 THERAPEUTIC ACTIVITIES: CPT

## 2019-08-27 PROCEDURE — 82947 ASSAY GLUCOSE BLOOD QUANT: CPT

## 2019-08-27 PROCEDURE — 2500000003 HC RX 250 WO HCPCS: Performed by: INTERNAL MEDICINE

## 2019-08-27 RX ORDER — POTASSIUM CHLORIDE 20 MEQ/1
40 TABLET, EXTENDED RELEASE ORAL PRN
Status: DISCONTINUED | OUTPATIENT
Start: 2019-08-27 | End: 2019-08-29 | Stop reason: HOSPADM

## 2019-08-27 RX ORDER — POTASSIUM CHLORIDE 7.45 MG/ML
10 INJECTION INTRAVENOUS PRN
Status: DISCONTINUED | OUTPATIENT
Start: 2019-08-27 | End: 2019-08-29 | Stop reason: HOSPADM

## 2019-08-27 RX ADMIN — RISPERIDONE 1 MG: 1 TABLET, FILM COATED ORAL at 09:39

## 2019-08-27 RX ADMIN — SENNOSIDES 17.2 MG: 8.6 TABLET, FILM COATED ORAL at 09:39

## 2019-08-27 RX ADMIN — SODIUM CHLORIDE, PRESERVATIVE FREE 10 ML: 5 INJECTION INTRAVENOUS at 09:40

## 2019-08-27 RX ADMIN — SENNOSIDES 17.2 MG: 8.6 TABLET, FILM COATED ORAL at 20:54

## 2019-08-27 RX ADMIN — INSULIN LISPRO 2 UNITS: 100 INJECTION, SOLUTION INTRAVENOUS; SUBCUTANEOUS at 20:57

## 2019-08-27 RX ADMIN — POTASSIUM CHLORIDE 40 MEQ: 1500 TABLET, EXTENDED RELEASE ORAL at 09:39

## 2019-08-27 RX ADMIN — INSULIN LISPRO 2 UNITS: 100 INJECTION, SOLUTION INTRAVENOUS; SUBCUTANEOUS at 14:51

## 2019-08-27 RX ADMIN — METOPROLOL TARTRATE 25 MG: 25 TABLET ORAL at 20:54

## 2019-08-27 RX ADMIN — POLYETHYLENE GLYCOL 3350 17 G: 17 POWDER, FOR SOLUTION ORAL at 20:55

## 2019-08-27 RX ADMIN — SODIUM CHLORIDE, PRESERVATIVE FREE 10 ML: 5 INJECTION INTRAVENOUS at 20:55

## 2019-08-27 RX ADMIN — INSULIN LISPRO 2 UNITS: 100 INJECTION, SOLUTION INTRAVENOUS; SUBCUTANEOUS at 09:48

## 2019-08-27 RX ADMIN — LACTULOSE 10 G: 20 SOLUTION ORAL at 20:55

## 2019-08-27 RX ADMIN — LACTULOSE 10 G: 20 SOLUTION ORAL at 14:51

## 2019-08-27 RX ADMIN — ENOXAPARIN SODIUM 40 MG: 40 INJECTION SUBCUTANEOUS at 09:39

## 2019-08-27 RX ADMIN — SODIUM CHLORIDE: 9 INJECTION, SOLUTION INTRAVENOUS at 10:02

## 2019-08-27 RX ADMIN — LACTULOSE 10 G: 20 SOLUTION ORAL at 09:39

## 2019-08-27 RX ADMIN — OLANZAPINE 5 MG: 10 INJECTION, POWDER, LYOPHILIZED, FOR SOLUTION INTRAMUSCULAR at 18:16

## 2019-08-27 RX ADMIN — WATER 2.1 ML: 1 INJECTION INTRAMUSCULAR; INTRAVENOUS; SUBCUTANEOUS at 18:16

## 2019-08-27 RX ADMIN — POLYETHYLENE GLYCOL 3350 17 G: 17 POWDER, FOR SOLUTION ORAL at 09:58

## 2019-08-27 RX ADMIN — METOPROLOL TARTRATE 25 MG: 25 TABLET ORAL at 09:39

## 2019-08-27 ASSESSMENT — PAIN SCALES - WONG BAKER
WONGBAKER_NUMERICALRESPONSE: 0
WONGBAKER_NUMERICALRESPONSE: 2
WONGBAKER_NUMERICALRESPONSE: 0

## 2019-08-27 ASSESSMENT — PAIN DESCRIPTION - PAIN TYPE: TYPE: ACUTE PAIN

## 2019-08-27 ASSESSMENT — PAIN DESCRIPTION - LOCATION: LOCATION: THROAT;GENERALIZED

## 2019-08-27 NOTE — PROGRESS NOTES
[address]    Progress Note    8/27/2019    9:30 AM    Name:   Rudolm Duverney  MRN:     [id number]     [place]:      [de-identified]   Room:   [facility] [address]  IP Day:  16  Admit Date:  8/11/2019  2:33 PM    PCP:   DESIREE Rao CNP  Code Status:  Full Code    Subjective:     C/C: AMS    Interval History Status: not changed. No acute issues overnight  Lethargic this AM but oriented  No complaints  Stomach distended  Surgery recommending conservative management  Currently tolerating tube feeds    Brief History:     63-year-old F who was admitted to [facility] on August 6 with a complaints of altered mental status. She was found to have pneumonia and was treated with IV Maxipime.  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  As her mentation was not improving and has felt her Irving syndrome was related to her psychiatric medications she was transferred to [facility] for psychiatric evaluation and further management.      Surgery/GI consulted. Given neostigmine and underwent urgent colonoscopy for bowel decompression. Neurology consulted for AMS, EEG unremarkable. Cultures showed no growth at 5 days. IV cefepime was discontinued due to possible neurological side effects.      She was evaluated by GI and she underwent colonoscopy 2 times for decompression.  General surgery and colorectal surgery also evaluated the patient. Yeni Vallejo was evaluated by neurology as well and underwent lumbar puncture and MRI. Review of Systems:     Constitutional:  negative for chills, fevers, sweats  Respiratory:  negative for cough, dyspnea on exertion, shortness of breath  Cardiovascular:  negative for chest pain, chest pressure/discomfort  Gastrointestinal:  negative for abdominal pain, nausea, vomiting  Neurological:  negative for dizziness, headache    Medications: Allergies:     Allergies   Allergen MRI negative, EEG normal, CSF normal   - Psych consulted - continue cogentin, risperdal, zyprexa - hold trazodone, benadryl   - GI consulted - s/p colonic decompression x 2 (8/12, 8/16) - further management per surgery   - Urology consulted - CIC after voiding, check PVR  - Labs and medications reviewed  - Continue lantus 20 nightly, insulin correction scale   - S/P course of abx - Levaquin x 7 days   - Encephalopathy multifactorial - continue to have waxing/waning  - Continue psych medications, frequent orientation  - Bowel regimen per surgery  - Dietary following - restarted on tube feeds  - Serial abdominal exams - monitor stool output and distension      Marc Denton MD  8/27/2019  9:30 AM

## 2019-08-27 NOTE — PROGRESS NOTES
Physical Therapy  Facility/Department: [address] STEPDOWN  Daily Treatment Note  NAME: Adilene Mcgee  : 1960  MRN: [id number]    Date of Service: 2019    Discharge Recommendations:  Patient would benefit from continued therapy after discharge   PT Equipment Recommendations  Equipment Needed: No    Assessment   Body structures, Functions, Activity limitations: Decreased functional mobility ; Decreased safe awareness;Decreased balance;Decreased cognition;Decreased strength;Decreased endurance  Assessment: Will require continued PT to maximize functional outcomes and to improve independence with functional mobility  Prognosis: Good  REQUIRES PT FOLLOW UP: Yes  Activity Tolerance  Activity Tolerance: Patient limited by fatigue  Activity Tolerance: Lethargic, difficulty staying awake     Patient Diagnosis(es): There were no encounter diagnoses. has a past medical history of Depression, Diabetes mellitus ([facility] [address] and Thyroid disease. has a past surgical history that includes Abdomen surgery; Cholecystectomy; Tubal ligation (); Umbilical hernia repair; Colonoscopy (2019); and Colonoscopy (N/A, 2019). Restrictions  Restrictions/Precautions  Restrictions/Precautions: Fall Risk  Required Braces or Orthoses?: No  Position Activity Restriction  Other position/activity restrictions: NG tube to suction. , up with A  Subjective   General  Response To Previous Treatment: Patient with no complaints from previous session. Family / Caregiver Present: No  Subjective  Subjective: Pt sleeping in bed upon arrival, lethargic, states \" i'm too sleepy i just want to rest\".  With much encouragmetn pt agreeable to EOB  Pain Screening  Patient Currently in Pain: Yes  Pain Assessment  Pain Assessment: Faces  Wiseman-Baker Pain Rating: Hurts a little bit  Pain Type: Acute pain  Pain Location: Throat;Generalized  Non-Pharmaceutical Pain Intervention(s): Repositioned  Response to Pain Intervention: Patient Satisfied  Vital

## 2019-08-27 NOTE — PROGRESS NOTES
57 08/26/2019     08/26/2019       Radiology Review:    KUB 8/26  Persistent gas distension throughout the colon, stable since August 24, 2019,   improved since August 23, 2019, likely related to ileus. 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 General Surgery PGY-1     .I Dr. Juliette Mueller saw and examined the patient. I have edited the above and agree with the above. Erendira George  Colorectal Surgery

## 2019-08-27 NOTE — PLAN OF CARE
Problem: Falls - Risk of:  Goal: Will remain free from falls  Description  Will remain free from falls  Outcome: Ongoing  Note:   Pt remains free of falls at this time. Bed locked in lowest position, siderails x2, call light in reach. Non-skid footwear applied. Pt ambulates in room with steady gait. Encouraged pt to call for assistance as needed for safety. Falling star posted outside of room. Will continue to monitor. Problem: Risk for Impaired Skin Integrity  Goal: Tissue integrity - skin and mucous membranes  Description  Structural intactness and normal physiological function of skin and  mucous membranes. Outcome: Ongoing  Note:   Pts skin maintains structural intactness and physiologic function. Pt able to reposition independently in bed/ Assisting pt with turns every 2 hours and heels elevated off bed. Linens remain clean and dry. Will continue to monitor. Problem: Nutrition  Goal: Optimal nutrition therapy  8/26/2019 1458 by Gilberto Valle, RD, LD  Outcome: Ongoing  Note:   Nutrition Problem: Inadequate oral intake  Intervention: Food and/or Nutrient Delivery: Start Tube Feeding(Restart TF of Vital AF 1.2 (semi-elemental) goal rate 60 ml/hr.)  Nutritional Goals: Meet % of estimated nutrition needs.      Problem: Gas Exchange - Impaired:  Goal: Levels of oxygenation will improve  Description  Levels of oxygenation will improve  8/26/2019 1112 by Eduard Alan RCP  Outcome: Ongoing

## 2019-08-28 ENCOUNTER — APPOINTMENT (OUTPATIENT)
Dept: GENERAL RADIOLOGY | Age: 59
DRG: 388 | End: 2019-08-28
Attending: INTERNAL MEDICINE
Payer: MEDICARE

## 2019-08-28 LAB
ANION GAP SERPL CALCULATED.3IONS-SCNC: 12 MMOL/L (ref 9–17)
BUN BLDV-MCNC: 12 MG/DL (ref 6–20)
BUN/CREAT BLD: ABNORMAL (ref 9–20)
CALCIUM SERPL-MCNC: 8.5 MG/DL (ref 8.6–10.4)
CHLORIDE BLD-SCNC: 112 MMOL/L (ref 98–107)
CO2: 23 MMOL/L (ref 20–31)
CREAT SERPL-MCNC: 0.59 MG/DL (ref 0.5–0.9)
GFR AFRICAN AMERICAN: >60 ML/MIN
GFR NON-AFRICAN AMERICAN: >60 ML/MIN
GFR SERPL CREATININE-BSD FRML MDRD: ABNORMAL ML/MIN/{1.73_M2}
GFR SERPL CREATININE-BSD FRML MDRD: ABNORMAL ML/MIN/{1.73_M2}
GLUCOSE BLD-MCNC: 131 MG/DL (ref 65–105)
GLUCOSE BLD-MCNC: 138 MG/DL (ref 65–105)
GLUCOSE BLD-MCNC: 154 MG/DL (ref 65–105)
GLUCOSE BLD-MCNC: 154 MG/DL (ref 65–105)
GLUCOSE BLD-MCNC: 156 MG/DL (ref 65–105)
GLUCOSE BLD-MCNC: 174 MG/DL (ref 70–99)
MAGNESIUM: 2 MG/DL (ref 1.6–2.6)
PHOSPHORUS: 3.4 MG/DL (ref 2.6–4.5)
POTASSIUM SERPL-SCNC: 3.6 MMOL/L (ref 3.7–5.3)
SODIUM BLD-SCNC: 147 MMOL/L (ref 135–144)

## 2019-08-28 PROCEDURE — 2580000003 HC RX 258: Performed by: INTERNAL MEDICINE

## 2019-08-28 PROCEDURE — 51798 US URINE CAPACITY MEASURE: CPT

## 2019-08-28 PROCEDURE — 2700000000 HC OXYGEN THERAPY PER DAY

## 2019-08-28 PROCEDURE — 99232 SBSQ HOSP IP/OBS MODERATE 35: CPT | Performed by: INTERNAL MEDICINE

## 2019-08-28 PROCEDURE — 6370000000 HC RX 637 (ALT 250 FOR IP): Performed by: INTERNAL MEDICINE

## 2019-08-28 PROCEDURE — 1200000000 HC SEMI PRIVATE

## 2019-08-28 PROCEDURE — 6370000000 HC RX 637 (ALT 250 FOR IP): Performed by: STUDENT IN AN ORGANIZED HEALTH CARE EDUCATION/TRAINING PROGRAM

## 2019-08-28 PROCEDURE — 92526 ORAL FUNCTION THERAPY: CPT

## 2019-08-28 PROCEDURE — 80048 BASIC METABOLIC PNL TOTAL CA: CPT

## 2019-08-28 PROCEDURE — 74018 RADEX ABDOMEN 1 VIEW: CPT

## 2019-08-28 PROCEDURE — 6370000000 HC RX 637 (ALT 250 FOR IP): Performed by: NURSE PRACTITIONER

## 2019-08-28 PROCEDURE — 2500000003 HC RX 250 WO HCPCS: Performed by: INTERNAL MEDICINE

## 2019-08-28 PROCEDURE — 36415 COLL VENOUS BLD VENIPUNCTURE: CPT

## 2019-08-28 PROCEDURE — 97530 THERAPEUTIC ACTIVITIES: CPT

## 2019-08-28 PROCEDURE — 84100 ASSAY OF PHOSPHORUS: CPT

## 2019-08-28 PROCEDURE — 82947 ASSAY GLUCOSE BLOOD QUANT: CPT

## 2019-08-28 PROCEDURE — 6360000002 HC RX W HCPCS: Performed by: INTERNAL MEDICINE

## 2019-08-28 PROCEDURE — 51701 INSERT BLADDER CATHETER: CPT

## 2019-08-28 PROCEDURE — 94760 N-INVAS EAR/PLS OXIMETRY 1: CPT

## 2019-08-28 PROCEDURE — 83735 ASSAY OF MAGNESIUM: CPT

## 2019-08-28 PROCEDURE — 97535 SELF CARE MNGMENT TRAINING: CPT

## 2019-08-28 RX ADMIN — RISPERIDONE 1 MG: 1 TABLET, FILM COATED ORAL at 09:57

## 2019-08-28 RX ADMIN — INSULIN LISPRO 2 UNITS: 100 INJECTION, SOLUTION INTRAVENOUS; SUBCUTANEOUS at 09:59

## 2019-08-28 RX ADMIN — POLYETHYLENE GLYCOL 3350 17 G: 17 POWDER, FOR SOLUTION ORAL at 21:06

## 2019-08-28 RX ADMIN — METOPROLOL TARTRATE 25 MG: 25 TABLET ORAL at 09:57

## 2019-08-28 RX ADMIN — INSULIN LISPRO 2 UNITS: 100 INJECTION, SOLUTION INTRAVENOUS; SUBCUTANEOUS at 02:08

## 2019-08-28 RX ADMIN — SODIUM CHLORIDE: 9 INJECTION, SOLUTION INTRAVENOUS at 22:49

## 2019-08-28 RX ADMIN — ENOXAPARIN SODIUM 40 MG: 40 INJECTION SUBCUTANEOUS at 09:57

## 2019-08-28 RX ADMIN — SENNOSIDES 17.2 MG: 8.6 TABLET, FILM COATED ORAL at 09:57

## 2019-08-28 RX ADMIN — WATER 2.1 ML: 1 INJECTION INTRAMUSCULAR; INTRAVENOUS; SUBCUTANEOUS at 17:24

## 2019-08-28 RX ADMIN — INSULIN LISPRO 2 UNITS: 100 INJECTION, SOLUTION INTRAVENOUS; SUBCUTANEOUS at 15:07

## 2019-08-28 RX ADMIN — OLANZAPINE 5 MG: 10 INJECTION, POWDER, LYOPHILIZED, FOR SOLUTION INTRAMUSCULAR at 17:24

## 2019-08-28 RX ADMIN — SODIUM CHLORIDE: 9 INJECTION, SOLUTION INTRAVENOUS at 05:11

## 2019-08-28 RX ADMIN — MAGNESIUM HYDROXIDE 30 ML: 400 SUSPENSION ORAL at 17:23

## 2019-08-28 RX ADMIN — LACTULOSE 10 G: 20 SOLUTION ORAL at 09:57

## 2019-08-28 RX ADMIN — ACETAMINOPHEN 650 MG: 325 TABLET ORAL at 11:46

## 2019-08-28 RX ADMIN — POLYETHYLENE GLYCOL 3350 17 G: 17 POWDER, FOR SOLUTION ORAL at 09:57

## 2019-08-28 ASSESSMENT — PAIN - FUNCTIONAL ASSESSMENT: PAIN_FUNCTIONAL_ASSESSMENT: PREVENTS OR INTERFERES SOME ACTIVE ACTIVITIES AND ADLS

## 2019-08-28 ASSESSMENT — PAIN DESCRIPTION - ORIENTATION
ORIENTATION: LEFT
ORIENTATION: LOWER

## 2019-08-28 ASSESSMENT — PAIN SCALES - WONG BAKER
WONGBAKER_NUMERICALRESPONSE: 0
WONGBAKER_NUMERICALRESPONSE: 4
WONGBAKER_NUMERICALRESPONSE: 0

## 2019-08-28 ASSESSMENT — PAIN DESCRIPTION - DESCRIPTORS
DESCRIPTORS: ACHING
DESCRIPTORS: ACHING

## 2019-08-28 ASSESSMENT — PAIN DESCRIPTION - LOCATION
LOCATION: HEAD
LOCATION: ABDOMEN

## 2019-08-28 ASSESSMENT — PAIN DESCRIPTION - PAIN TYPE
TYPE: ACUTE PAIN
TYPE: ACUTE PAIN

## 2019-08-28 ASSESSMENT — PAIN SCALES - GENERAL: PAINLEVEL_OUTOF10: 5

## 2019-08-28 NOTE — PLAN OF CARE
Problem: Falls - Risk of:  Goal: Will remain free from falls  Description  Will remain free from falls  8/27/2019 2149 by Kevin Bermudez RN  Outcome: Ongoing  Note:   Pt remains free of falls at this time. Bed locked in lowest position, siderails x2, call light in reach. Non-skid footwear applied. Pt ambulates in room with steady gait. Encouraged pt to call for assistance as needed for safety. Falling star posted outside of room. Will continue to monitor. 8/27/2019 1850 by Krystal Haider RN  Outcome: Met This Shift     Problem: Risk for Impaired Skin Integrity  Goal: Tissue integrity - skin and mucous membranes  Description  Structural intactness and normal physiological function of skin and  mucous membranes. 8/27/2019 2149 by Kevin Bermudez RN  Outcome: Ongoing  Note:   Pts skin maintains structural intactness and physiologic function. Pt able to reposition independently in bed/ Assisting pt with turns every 2 hours and heels elevated off bed. Linens remain clean and dry. Will continue to monitor.    8/27/2019 1850 by Krytsal Haider RN  Outcome: Met This Shift     Problem: Gas Exchange - Impaired:  Goal: Levels of oxygenation will improve  Description  Levels of oxygenation will improve  8/27/2019 1850 by Krystal Haider RN  Outcome: Met This Shift

## 2019-08-28 NOTE — PROGRESS NOTES
Nutrition Assessment (Enteral Nutrition)    Type and Reason for Visit: Reassess    Nutrition Recommendations:   - Continue Vital AF 1.2 (semi-elemental) TF slowly advancing to goal rate of 60 mL/hr as tolerated. - Monitor results of SLP swallow study/evaluation and for start of oral diet as appropriate/able. - Will monitor labs, bowel function, and plan of care. Nutrition Assessment: TF being slowly advanced - currently running at 40 mL/hr - RN reports plans to try to increase but pt with continued abdominal distention. Pt has been tolerating TF with minimal residuals of 10-40 mL. RN reports plans for SLP to complete a swallow study today. Labs reviewed - Na 147 mmol and K 3.6 mmol noted. Malnutrition Assessment:  · Malnutrition Status: Meets the criteria for moderate malnutrition  · Context: Acute illness or injury  · Findings of the 6 clinical characteristics of malnutrition (Minimum of 2 out of 6 clinical characteristics is required to make the diagnosis of moderate or severe Protein Calorie Malnutrition based on AND/ASPEN Guidelines):  1. Energy Intake-Less than or equal to 75% of estimated energy requirement, Greater than or equal to 7 days    2. Weight Loss-5% loss or greater, in 1 month  3. Fat Loss-No significant subcutaneous fat loss,    4. Muscle Loss-No significant muscle mass loss,    5. Fluid Accumulation-No significant fluid accumulation,      Nutrition Risk Level: High    Nutrition Needs:  · Estimated Daily Total Kcal: 6175-8318 kcal/day  · Estimated Daily Protein (g):  gm pro/day    Nutrition Diagnosis:   · Problem: Inadequate oral intake  · Etiology: related to Alteration in GI function([place])     Signs and symptoms:  as evidenced by NPO status due to medical condition, Nutrition support - EN    Objective Information:  · Nutrition-Focused Physical Findings: NG in place. Distended abdomen.   · Wound Type: Skin Tears, Stage II, Pressure Ulcer(to left buttocks)  · Current

## 2019-08-28 NOTE — PROGRESS NOTES
PROT 6.4 08/26/2019    LABALBU 3.3 08/26/2019    CALCIUM 8.7 08/27/2019    BILITOT 0.61 08/26/2019    ALKPHOS 129 08/26/2019    AST 57 08/26/2019     08/26/2019     Hepatic Function Panel:    Lab Results   Component Value Date    ALKPHOS 129 08/26/2019     08/26/2019    AST 57 08/26/2019    PROT 6.4 08/26/2019    BILITOT 0.61 08/26/2019    LABALBU 3.3 08/26/2019     Magnesium:    Lab Results   Component Value Date    MG 2.0 08/27/2019     Phosphorus:    Lab Results   Component Value Date    PHOS 4.2 08/26/2019       ASSESSMENT:  Active Hospital Problems    Diagnosis Date Noted    Moderate malnutrition ([address] [E44.0] 08/16/2019    Bacterial pneumonia [J15.9] 08/11/2019    Controlled type 2 diabetes mellitus without complication, with long-term current use of insulin ([address] [E11.9, Z79.4] 08/11/2019    Dyslipidemia [E78.5] 08/11/2019    Other specified hypothyroidism [E03.8] 08/11/2019    Schizophrenia ([address] [F20.9] 08/11/2019    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 Vonnie Navas saw and examined the patient. I have edited the above and agree with the above. Erendira George  Colorectal Surgery

## 2019-08-28 NOTE — PROGRESS NOTES
Guerrero Garr[address] 19    Progress Note    8/28/2019    10:01 AM    Name:   Candis Javed  MRN:     [id number]     [place]:      [de-identified]   Room:   [address] [place] [address]  IP Day:  16  Admit Date:  8/11/2019  2:33 PM    PCP:   DESIREE Bustillos CNP  Code Status:  Full Code    Subjective:     C/C: AMS    Interval History Status: not changed. No acute issues overnight  Intermittent lethargy/confusion  Remains oriented  Complaining for some increased abdominal pain/distension  Passing gas and having BM per RN  Tolerating tube feeds      Brief History:     78-year-old F who was admitted to [facility] on August 6 with a complaints of altered mental status. She was found to have pneumonia and was treated with IV Maxipime.  During her hospital stay she has developed an [place] syndrome was evaluated by surgery.  As her mentation was not improving and has felt her [place] syndrome was related to her psychiatric medications she was transferred to [facility] for psychiatric evaluation and further management.      Surgery/GI consulted. Given neostigmine and underwent urgent colonoscopy for bowel decompression. Neurology consulted for AMS, EEG unremarkable. Cultures showed no growth at 5 days. IV cefepime was discontinued due to possible neurological side effects.      She was evaluated by GI and she underwent colonoscopy 2 times for decompression.  General surgery and colorectal surgery also evaluated the patient. Thu Levin was evaluated by neurology as well and underwent lumbar puncture and MRI. Review of Systems:     Constitutional:  negative for chills, fevers, sweats  Respiratory:  negative for cough, dyspnea on exertion, shortness of breath  Cardiovascular:  negative for chest pain, chest pressure/discomfort  Gastrointestinal:  Positive for abdominal pain, distension   Neurological:  negative for dizziness, headache    Medications:      Allergies: Allergies   Allergen Reactions    Bicillin [Penicillin G Benzathine] Shortness Of Breath    Geodon [Ziprasidone]      \"I can't sit still\"       Current Meds:   Scheduled Meds:    polyethylene glycol  17 g Oral BID    [Held by provider] insulin glargine  20 Units Subcutaneous Nightly    metoprolol tartrate  25 mg Oral BID    lactulose  10 g Oral TID    morphine  2 mg Intravenous Once    risperiDONE  1 mg Oral Daily    senna  2 tablet Oral BID    OLANZapine  5 mg Intramuscular QPM    And    sterile water  2.1 mL Intramuscular QPM    [Held by provider] traZODone  100 mg Oral Nightly    [Held by provider] atorvastatin  20 mg Oral Daily    sodium chloride flush  10 mL Intravenous 2 times per day    enoxaparin  40 mg Subcutaneous Daily    insulin lispro  0-12 Units Subcutaneous Q6H     Continuous Infusions:    sodium chloride 50 mL/hr at 19 0511    dextrose       PRN Meds: potassium chloride **OR** potassium alternative oral replacement **OR** potassium chloride, promethazine, hydrALAZINE, diphenhydrAMINE, glucose, dextrose, glucagon (rDNA), dextrose, sodium chloride flush, magnesium sulfate, magnesium hydroxide, ondansetron, nicotine, acetaminophen, atropine    Data:     Past Medical History:   has a past medical history of Depression, Diabetes mellitus ([address] and Thyroid disease. Social History:   reports that she quit smoking about 5 months ago. Her smoking use included cigarettes. She has a 40.00 pack-year smoking history. She has never used smokeless tobacco. She reports that she does not drink alcohol or use drugs.      Family History:   Family History   Problem Relation Age of Onset    Diabetes Mother     Heart Disease Mother     Stroke Brother        Vitals:  /81   Pulse 91   Temp 99.7 °F (37.6 °C) (Temporal)   Resp 15   Ht 5' 5\" (1.651 m)   Wt 172 lb 2.9 oz (78.1 kg)   SpO2 96%   BMI 28.65 kg/m²   Temp (24hrs), Av.6 °F (37 °C), Min:97.6 °F (36.4 °C), Max:99.7 °F

## 2019-08-28 NOTE — PROGRESS NOTES
Person Assistance  Stand to sit: Minimal Assistance;2 Person Assistance  Ambulation  Ambulation?: No     Balance  Posture: Fair  Sitting - Static: Fair  Sitting - Dynamic: Fair;-  Standing - Static: Fair  Standing - Dynamic: Fair;-  Comments: PT stood x 1 min wtih RW and MIN Ax2    Exercise  Seated ankle pumps and LAQ x 5              Goals  Short term goals  Time Frame for Short term goals: 14 visits  Short term goal 1: Pt to follow all commands for active exercise  Short term goal 2: Pt to sit EOB Min A. Short term goal 3: Progess with out of bed activities as appropriate  Patient Goals   Patient goals : Could not state    Plan    Plan  Times per week: 5x/week  Current Treatment Recommendations: Strengthening, Cognitive Reorientation, Functional Mobility Training, Safety Education & Training  Safety Devices  Type of devices:  All fall risk precautions in place, Call light within reach, Nurse notified, Patient at risk for falls, Left in bed, Bed alarm in place  Restraints  Initially in place: No     Therapy Time   Individual Concurrent Group Co-treatment   Time In 1504         Time Out 1523         Minutes [address] [place], [place]

## 2019-08-29 ENCOUNTER — APPOINTMENT (OUTPATIENT)
Dept: GENERAL RADIOLOGY | Age: 59
DRG: 388 | End: 2019-08-29
Attending: INTERNAL MEDICINE
Payer: MEDICARE

## 2019-08-29 VITALS
SYSTOLIC BLOOD PRESSURE: 120 MMHG | HEART RATE: 81 BPM | BODY MASS INDEX: 29.49 KG/M2 | DIASTOLIC BLOOD PRESSURE: 65 MMHG | HEIGHT: 65 IN | WEIGHT: 177 LBS | OXYGEN SATURATION: 96 % | TEMPERATURE: 98 F | RESPIRATION RATE: 16 BRPM

## 2019-08-29 LAB
ANION GAP SERPL CALCULATED.3IONS-SCNC: 14 MMOL/L (ref 9–17)
BUN BLDV-MCNC: 9 MG/DL (ref 6–20)
BUN/CREAT BLD: ABNORMAL (ref 9–20)
CALCIUM SERPL-MCNC: 8.4 MG/DL (ref 8.6–10.4)
CHLORIDE BLD-SCNC: 107 MMOL/L (ref 98–107)
CO2: 22 MMOL/L (ref 20–31)
CREAT SERPL-MCNC: 0.52 MG/DL (ref 0.5–0.9)
GFR AFRICAN AMERICAN: >60 ML/MIN
GFR NON-AFRICAN AMERICAN: >60 ML/MIN
GFR SERPL CREATININE-BSD FRML MDRD: ABNORMAL ML/MIN/{1.73_M2}
GFR SERPL CREATININE-BSD FRML MDRD: ABNORMAL ML/MIN/{1.73_M2}
GLUCOSE BLD-MCNC: 108 MG/DL (ref 70–99)
GLUCOSE BLD-MCNC: 112 MG/DL (ref 65–105)
GLUCOSE BLD-MCNC: 116 MG/DL (ref 65–105)
MAGNESIUM: 2.3 MG/DL (ref 1.6–2.6)
POTASSIUM SERPL-SCNC: 3.4 MMOL/L (ref 3.7–5.3)
SODIUM BLD-SCNC: 143 MMOL/L (ref 135–144)

## 2019-08-29 PROCEDURE — 80048 BASIC METABOLIC PNL TOTAL CA: CPT

## 2019-08-29 PROCEDURE — 36415 COLL VENOUS BLD VENIPUNCTURE: CPT

## 2019-08-29 PROCEDURE — 76937 US GUIDE VASCULAR ACCESS: CPT

## 2019-08-29 PROCEDURE — 82947 ASSAY GLUCOSE BLOOD QUANT: CPT

## 2019-08-29 PROCEDURE — 6360000002 HC RX W HCPCS: Performed by: INTERNAL MEDICINE

## 2019-08-29 PROCEDURE — 83735 ASSAY OF MAGNESIUM: CPT

## 2019-08-29 PROCEDURE — 51798 US URINE CAPACITY MEASURE: CPT

## 2019-08-29 PROCEDURE — 6370000000 HC RX 637 (ALT 250 FOR IP): Performed by: STUDENT IN AN ORGANIZED HEALTH CARE EDUCATION/TRAINING PROGRAM

## 2019-08-29 PROCEDURE — 74018 RADEX ABDOMEN 1 VIEW: CPT

## 2019-08-29 PROCEDURE — 97110 THERAPEUTIC EXERCISES: CPT

## 2019-08-29 PROCEDURE — 99239 HOSP IP/OBS DSCHRG MGMT >30: CPT | Performed by: INTERNAL MEDICINE

## 2019-08-29 PROCEDURE — 6370000000 HC RX 637 (ALT 250 FOR IP): Performed by: INTERNAL MEDICINE

## 2019-08-29 PROCEDURE — 6370000000 HC RX 637 (ALT 250 FOR IP): Performed by: NURSE PRACTITIONER

## 2019-08-29 PROCEDURE — 97530 THERAPEUTIC ACTIVITIES: CPT

## 2019-08-29 RX ORDER — UREA 10 %
3 LOTION (ML) TOPICAL ONCE
Status: COMPLETED | OUTPATIENT
Start: 2019-08-29 | End: 2019-08-29

## 2019-08-29 RX ORDER — ERYTHROMYCIN ETHYLSUCCINATE 400 MG/1
400 TABLET ORAL
Status: DISCONTINUED | OUTPATIENT
Start: 2019-08-29 | End: 2019-08-29 | Stop reason: HOSPADM

## 2019-08-29 RX ORDER — POLYETHYLENE GLYCOL 3350 17 G/17G
17 POWDER, FOR SOLUTION ORAL 2 TIMES DAILY
Qty: 60 EACH | Refills: 0 | Status: SHIPPED | OUTPATIENT
Start: 2019-08-29 | End: 2019-09-28

## 2019-08-29 RX ORDER — LACTULOSE 10 G/15ML
10 SOLUTION ORAL 3 TIMES DAILY
Qty: 1 BOTTLE | Refills: 1 | Status: SHIPPED | OUTPATIENT
Start: 2019-08-29

## 2019-08-29 RX ORDER — ERYTHROMYCIN ETHYLSUCCINATE 400 MG/1
400 TABLET ORAL
Qty: 42 TABLET | Refills: 0 | Status: SHIPPED | OUTPATIENT
Start: 2019-08-29 | End: 2019-09-12

## 2019-08-29 RX ORDER — SENNA PLUS 8.6 MG/1
2 TABLET ORAL 2 TIMES DAILY
Qty: 120 TABLET | Refills: 0 | Status: SHIPPED | OUTPATIENT
Start: 2019-08-29 | End: 2019-09-23 | Stop reason: SDUPTHER

## 2019-08-29 RX ORDER — ERYTHROMYCIN ETHYLSUCCINATE 400 MG/1
400 TABLET ORAL
Status: DISCONTINUED | OUTPATIENT
Start: 2019-08-29 | End: 2019-08-29

## 2019-08-29 RX ADMIN — RISPERIDONE 1 MG: 1 TABLET, FILM COATED ORAL at 08:37

## 2019-08-29 RX ADMIN — Medication 3 MG: at 04:33

## 2019-08-29 RX ADMIN — PROMETHAZINE HYDROCHLORIDE 12.5 MG: 25 INJECTION INTRAMUSCULAR; INTRAVENOUS at 02:00

## 2019-08-29 RX ADMIN — ERYTHROMYCIN ETHYLSUCCINATE 400 MG: 400 TABLET ORAL at 13:26

## 2019-08-29 RX ADMIN — ENOXAPARIN SODIUM 40 MG: 40 INJECTION SUBCUTANEOUS at 08:38

## 2019-08-29 RX ADMIN — POLYETHYLENE GLYCOL 3350 17 G: 17 POWDER, FOR SOLUTION ORAL at 08:38

## 2019-08-29 ASSESSMENT — PAIN SCALES - WONG BAKER
WONGBAKER_NUMERICALRESPONSE: 0

## 2019-08-29 ASSESSMENT — PAIN SCALES - GENERAL: PAINLEVEL_OUTOF10: 0

## 2019-08-29 NOTE — DISCHARGE SUMMARY
unremarkable findings. Continued to have waxing/waning of mentation. Bowel dilation remained stable, passing multiple BM. Colon resection deferred by surgery given poor mentation and unable to consent.  DC to SNF.        Significant therapeutic interventions:   - Colorectal surgery following - OR vs conservative management - continue bowel regimen   - Neurology consulted - MRI negative, EEG normal, CSF normal   - Psych consulted - continue cogentin, risperdal, zyprexa - hold trazodone, benadryl   - GI consulted - s/p colonic decompression x 2 (8/12, 8/16) - further management per surgery   - Urology consulted - CIC after voiding, check PVR  - Labs and medications reviewed  - Continue lantus 20 nightly - currently on hold - resume pending PO intake  - Continue insulin correction scale   - S/P course of abx - Levaquin x 7 days   - Encephalopathy multifactorial - continue to have waxing/waning  - Continue psych medications, frequent orientation  - Bowel regimen per surgery  - Swallow evaluation - passed, advance diet as tolerated   - Case discussed with surgery, recommending conservative management - 2 week trial of erythromycin - ok to dc    Significant Diagnostic Studies:   Labs / Micro:  CBC:   Lab Results   Component Value Date    WBC 7.8 08/26/2019    RBC 4.31 08/26/2019    HGB 12.3 08/26/2019    HCT 39.6 08/26/2019    MCV 91.9 08/26/2019    MCH 28.5 08/26/2019    MCHC 31.1 08/26/2019    RDW 13.1 08/26/2019     08/26/2019     BMP:    Lab Results   Component Value Date    GLUCOSE 108 08/29/2019     08/29/2019    K 3.4 08/29/2019     08/29/2019    CO2 22 08/29/2019    ANIONGAP 14 08/29/2019    BUN 9 08/29/2019    CREATININE 0.52 08/29/2019    BUNCRER NOT REPORTED 08/29/2019    CALCIUM 8.4 08/29/2019    LABGLOM >60 08/29/2019    GFRAA >60 08/29/2019    GFR      08/29/2019    GFR NOT REPORTED 08/29/2019     HFP:    Lab Results   Component Value Date    PROT 6.4 08/26/2019     CMP:    Lab Results Component Value Date    GLUCOSE 108 08/29/2019     08/29/2019    K 3.4 08/29/2019     08/29/2019    CO2 22 08/29/2019    BUN 9 08/29/2019    CREATININE 0.52 08/29/2019    ANIONGAP 14 08/29/2019    ALKPHOS 129 08/26/2019     08/26/2019    AST 57 08/26/2019    BILITOT 0.61 08/26/2019    LABALBU 3.3 08/26/2019    ALBUMIN 1.1 08/26/2019    LABGLOM >60 08/29/2019    GFRAA >60 08/29/2019    GFR      08/29/2019    GFR NOT REPORTED 08/29/2019    PROT 6.4 08/26/2019    CALCIUM 8.4 08/29/2019     PT/INR:    Lab Results   Component Value Date    PROTIME 10.8 08/12/2019    INR 1.0 08/12/2019     PTT: No results found for: APTT  FLP:    Lab Results   Component Value Date    TRIG 298 08/19/2019     U/A:    Lab Results   Component Value Date    COLORU YELLOW 08/18/2019    TURBIDITY CLEAR 08/18/2019    SPECGRAV 1.025 08/18/2019    HGBUR NEGATIVE 08/18/2019    PHUR >9.0 08/18/2019    PROTEINU NEGATIVE  Verified by sulfosalicylic acid test. 73/57/0181    GLUCOSEU NEGATIVE 08/18/2019    KETUA NEGATIVE 08/18/2019    BILIRUBINUR NEGATIVE 08/18/2019    UROBILINOGEN Normal 08/18/2019    NITRU NEGATIVE 08/18/2019    LEUKOCYTESUR TRACE 08/18/2019     TSH:    Lab Results   Component Value Date    TSH 3.83 08/15/2019        Radiology:  Xr Abdomen (kub) (single Ap View)    Result Date: 8/29/2019  No significant interval change in gas throughout the colon and prominence of the cecum. No small bowel distention identified. Xr Abdomen (kub) (single Ap View)    Result Date: 8/28/2019  Mild gaseous prominence of the cecum again noted with nondistended colonic gas and absence of small bowel gas. Overall this finding is improved since CT imaging 2 weeks ago. This may be due to chronic colonic inertia in the appropriate clinical setting. Enteric tube terminates in the distal stomach.      Xr Abdomen (kub) (single Ap View)    Result Date: 8/26/2019  Persistent gas distension throughout the colon, stable since August 24, 2019, improved since August 23, 2019, likely related to ileus. Xr Abdomen (kub) (single Ap View)    Result Date: 8/25/2019  Increased distention of the bowel when compared to the prior study. The cecum is dilated and measures approximately 13.7 cm. This could represent ileus or obstruction. Continued follow-up is suggested. Xr Abdomen (kub) (single Ap View)    Result Date: 8/24/2019  Diminishing volume of gas in the colon with a cecal diameter of 8.8 cm. Consultations:    Consults:     Final Specialist Recommendations/Findings:   IP CONSULT TO PSYCHIATRY  IP CONSULT TO GENERAL SURGERY  IP CONSULT TO CRITICAL CARE  IP CONSULT TO GI  IP CONSULT TO NEUROLOGY  IP CONSULT TO PSYCHIATRY  IP CONSULT TO DIETITIAN  IP CONSULT TO DIETITIAN  IP CONSULT TO UROLOGY  IP CONSULT TO DIETITIAN  IP CONSULT TO PSYCHIATRY  IP CONSULT TO DIETITIAN  IP CONSULT TO IV TEAM      The patient was seen and examined on day of discharge and this discharge summary is in conjunction with any daily progress note from day of discharge.     Discharge plan:     Disposition: Skilled Facility    Physician Follow Up:     Neftaly Rosario, APRN - [facility]  [address] [facility] [address]  [address]/ [place] [address]  [phone]    In 2 weeks         Requiring Further Evaluation/Follow Up POST HOSPITALIZATION/Incidental Findings:     Diet: regular diet    Activity: As tolerated    Instructions to Patient: follow up with colorectal surgery     Discharge Medications:      Medication List      START taking these medications    erythromycin ethylsuccinate 400 MG tablet  Commonly known as:  EES  Take 1 tablet by mouth 3 times daily (with meals) for 14 days     lactulose 10 GM/15ML solution  Commonly known as:  CHRONULAC  Take 15 mLs by mouth 3 times daily     metoprolol tartrate 25 MG tablet  Commonly known as:  LOPRESSOR  Take 1 tablet by mouth 2 times daily     polyethylene glycol

## 2019-08-29 NOTE — PROGRESS NOTES
CMP:    Lab Results   Component Value Date     08/29/2019    K 3.4 08/29/2019     08/29/2019    CO2 22 08/29/2019    BUN 9 08/29/2019    CREATININE 0.52 08/29/2019    GFRAA >60 08/29/2019    LABGLOM >60 08/29/2019    GLUCOSE 108 08/29/2019    PROT 6.4 08/26/2019    LABALBU 3.3 08/26/2019    CALCIUM 8.4 08/29/2019    BILITOT 0.61 08/26/2019    ALKPHOS 129 08/26/2019    AST 57 08/26/2019     08/26/2019     Hepatic Function Panel:    Lab Results   Component Value Date    ALKPHOS 129 08/26/2019     08/26/2019    AST 57 08/26/2019    PROT 6.4 08/26/2019    BILITOT 0.61 08/26/2019    LABALBU 3.3 08/26/2019     Magnesium:    Lab Results   Component Value Date    MG 2.3 08/29/2019     Phosphorus:    Lab Results   Component Value Date    PHOS 3.4 08/28/2019         ASSESSMENT:  Active Hospital Problems    Diagnosis Date Noted    Moderate malnutrition ([address] [E44.0] 08/16/2019    Bacterial pneumonia [J15.9] 08/11/2019    Controlled type 2 diabetes mellitus without complication, with long-term current use of insulin ([address] [E11.9, Z79.4] 08/11/2019    Dyslipidemia [E78.5] 08/11/2019    Other specified hypothyroidism [E03.8] 08/11/2019    Schizophrenia ([address] [F20.9] 08/11/2019    Pseudo-obstruction of colon [K59.8]     Toxic metabolic encephalopathy [F97]        62 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 Continue bowel regimen  2. On dysphagia diet, NGT clamped  3. Continue to monitor         Electronically signed by Raleigh Oakes MD  on 8/29/2019 at 6:14 AM      I Dr. Antonella Castro saw and examined the patient. I have edited the above and agree with the above. Erendira George  Colorectal Surgery

## 2019-08-29 NOTE — PROGRESS NOTES
Patient discharged with all belongings via [place] [id number]. One IV removed with no complications and catheter intact. Report called to Sylvie Spear and [address]. All questions answered. Patient left unit via stretcher.

## 2019-09-04 ENCOUNTER — OUTSIDE SERVICES (OUTPATIENT)
Dept: FAMILY MEDICINE CLINIC | Age: 59
End: 2019-09-04
Payer: MEDICARE

## 2019-09-04 DIAGNOSIS — G92.8 TOXIC METABOLIC ENCEPHALOPATHY: ICD-10-CM

## 2019-09-04 DIAGNOSIS — J15.9 BACTERIAL PNEUMONIA: ICD-10-CM

## 2019-09-04 DIAGNOSIS — E11.9 CONTROLLED TYPE 2 DIABETES MELLITUS WITHOUT COMPLICATION, WITH LONG-TERM CURRENT USE OF INSULIN (HCC): Primary | ICD-10-CM

## 2019-09-04 DIAGNOSIS — K59.81 OGILVIE'S SYNDROME: ICD-10-CM

## 2019-09-04 DIAGNOSIS — F20.9 SCHIZOPHRENIA, UNSPECIFIED TYPE (HCC): ICD-10-CM

## 2019-09-04 DIAGNOSIS — Z79.4 CONTROLLED TYPE 2 DIABETES MELLITUS WITHOUT COMPLICATION, WITH LONG-TERM CURRENT USE OF INSULIN (HCC): Primary | ICD-10-CM

## 2019-09-04 PROCEDURE — 99306 1ST NF CARE HIGH MDM 50: CPT | Performed by: FAMILY MEDICINE

## 2019-09-04 RX ORDER — BUSPIRONE HYDROCHLORIDE 10 MG/1
10 TABLET ORAL 3 TIMES DAILY
Qty: 90 TABLET | Refills: 0 | OUTPATIENT
Start: 2019-09-04 | End: 2019-09-18

## 2019-09-04 ASSESSMENT — ENCOUNTER SYMPTOMS
WHEEZING: 0
SHORTNESS OF BREATH: 0
DIARRHEA: 1
CONSTIPATION: 0
COUGH: 0
NAUSEA: 0
CHEST TIGHTNESS: 0
ABDOMINAL PAIN: 0
ABDOMINAL DISTENTION: 1

## 2019-09-04 NOTE — PROGRESS NOTES
palpitations and leg swelling. Gastrointestinal: Positive for abdominal distention and diarrhea. Negative for abdominal pain, constipation and nausea. Genitourinary: Negative for difficulty urinating. Neurological: Negative for dizziness, syncope, weakness and light-headedness. Psychiatric/Behavioral: Negative for agitation, behavioral problems, confusion, decreased concentration, dysphoric mood and sleep disturbance. The patient is nervous/anxious. Objective:      Physical Exam   Constitutional: She is oriented to person, place, and time. She appears well-developed and well-nourished. No distress. Eyes: Conjunctivae are normal.   Neck: Normal range of motion. Neck supple. No thyromegaly present. Cardiovascular: Normal rate, regular rhythm, normal heart sounds and intact distal pulses. No murmur heard. Pulmonary/Chest: Effort normal and breath sounds normal. No respiratory distress. She has no wheezes. Abdominal: Soft. Bowel sounds are normal. She exhibits distension. She exhibits no fluid wave and no mass. There is no tenderness. There is no rigidity, no rebound and no guarding. Musculoskeletal: She exhibits no edema. Lymphadenopathy:     She has no cervical adenopathy. Neurological: She is alert and oriented to person, place, and time. Skin: Skin is warm and dry. No rash noted. No erythema. Psychiatric: She has a normal mood and affect. Her speech is normal and behavior is normal. Thought content normal. Cognition and memory are normal. She is inattentive. Nursing note and vitals reviewed. Assessment:       Diagnosis Orders   1. Controlled type 2 diabetes mellitus without complication, with long-term current use of insulin ([address]     2. Toxic metabolic encephalopathy     3. Schizophrenia, unspecified type ([address]     4. Lorenza's syndrome     5. Bacterial pneumonia               Plan:        DM: stable; her sugars have been well controlled since being at [facility].  I will continue her current dose of insulin and as she is eating better I will increase it as needed. Metabolic encephalopathy: improving; she seems to be doing well at this time and she does not seem confused. She is working with speech therapy which should also help her mental processing. Schizophrenia: stable; she seems to be doing well. She is feeling a little anxious though so I will start her on buspar. Oligivies syndrome: stable; she is on erythromycin for 2 weeks for conservative treatment of it. Pneumonia: resolved; she is doing well. Return in about 1 week (around 9/11/2019) for pneumonia follow up. Orders Placed This Encounter   Medications    busPIRone (BUSPAR) 10 MG tablet     Sig: Take 1 tablet by mouth 3 times daily     Dispense:  90 tablet     Refill:  0       Patientgiven educational materials - see patient instructions. Discussed use, benefit,and side effects of prescribed medications. All patient questions answered. Ptvoiced understanding. Reviewed health maintenance. Instructed to continue currentmedications, diet and exercise. Patient agreed with treatment plan. Follow up asdirected.      Electronically signed by Brant Cerda MD on 9/4/2019 at 4:09 PM NPO recovering from tracheostomy

## 2019-09-11 ENCOUNTER — OUTSIDE SERVICES (OUTPATIENT)
Dept: FAMILY MEDICINE CLINIC | Age: 59
End: 2019-09-11
Payer: MEDICARE

## 2019-09-11 DIAGNOSIS — K59.81 OGILVIE'S SYNDROME: ICD-10-CM

## 2019-09-11 DIAGNOSIS — F20.9 SCHIZOPHRENIA, UNSPECIFIED TYPE (HCC): ICD-10-CM

## 2019-09-11 DIAGNOSIS — G92.8 TOXIC METABOLIC ENCEPHALOPATHY: ICD-10-CM

## 2019-09-11 DIAGNOSIS — J15.9 BACTERIAL PNEUMONIA: ICD-10-CM

## 2019-09-11 DIAGNOSIS — E11.9 CONTROLLED TYPE 2 DIABETES MELLITUS WITHOUT COMPLICATION, WITH LONG-TERM CURRENT USE OF INSULIN (HCC): ICD-10-CM

## 2019-09-11 DIAGNOSIS — Z79.4 CONTROLLED TYPE 2 DIABETES MELLITUS WITHOUT COMPLICATION, WITH LONG-TERM CURRENT USE OF INSULIN (HCC): ICD-10-CM

## 2019-09-11 DIAGNOSIS — E44.0 MODERATE MALNUTRITION (HCC): Primary | ICD-10-CM

## 2019-09-11 PROCEDURE — 99309 SBSQ NF CARE MODERATE MDM 30: CPT | Performed by: FAMILY MEDICINE

## 2019-09-11 NOTE — PROGRESS NOTES
Neck: Normal range of motion. Neck supple. No thyromegaly present. Cardiovascular: Normal rate, regular rhythm, normal heart sounds and intact distal pulses. No murmur heard. Pulmonary/Chest: Effort normal and breath sounds normal. No respiratory distress. She has no wheezes. Abdominal: Soft. Bowel sounds are normal. She exhibits distension. She exhibits no mass. There is no tenderness. There is no rebound and no guarding. Musculoskeletal: She exhibits no edema. Lymphadenopathy:     She has no cervical adenopathy. Neurological: She is alert and oriented to person, place, and time. Skin: Skin is warm and dry. No rash noted. No erythema. Psychiatric: She has a normal mood and affect. Her behavior is normal. Thought content normal.   Nursing note and vitals reviewed. Assessment:       Diagnosis Orders   1. Moderate malnutrition ([address]     2. Controlled type 2 diabetes mellitus without complication, with long-term current use of insulin ([facility])     3. Schizophrenia, unspecified type ([address]     4. Lorenza's syndrome     5. Toxic metabolic encephalopathy     6. Bacterial pneumonia               Plan:        Malnutrition: improving; she has been eating very well since coming to [facility] and she has a good appetite. DM: stable; her sugars are well controlled on her current medications and she has not had any hypoglycemia. Schizophrenia: stable; she has not had any disorganized thinking, she is sleeping well and her anxiety has improved. Oligivie's syndrome: improving; she is having regular bowel movements and she is not having any abdominal pain however she is still quite distended. Delirium: resolved    Pneumonia: resolved    Discharge: she is doing very well and she has a home visit scheduled for next week. She should then be discharged at the end of next week. Return in about 1 week (around 9/18/2019) for DM follow up.       Patientgiven educational materials - see patient

## 2019-09-14 ASSESSMENT — ENCOUNTER SYMPTOMS
COUGH: 0
CHEST TIGHTNESS: 0
CONSTIPATION: 0
NAUSEA: 0
ABDOMINAL PAIN: 0
SHORTNESS OF BREATH: 0
DIARRHEA: 0
ABDOMINAL DISTENTION: 1
WHEEZING: 0

## 2019-09-17 ENCOUNTER — OUTSIDE SERVICES (OUTPATIENT)
Dept: PRIMARY CARE CLINIC | Age: 59
End: 2019-09-17
Payer: MEDICARE

## 2019-09-17 DIAGNOSIS — E11.9 CONTROLLED TYPE 2 DIABETES MELLITUS WITHOUT COMPLICATION, WITH LONG-TERM CURRENT USE OF INSULIN (HCC): Primary | ICD-10-CM

## 2019-09-17 DIAGNOSIS — F20.9 SCHIZOPHRENIA, UNSPECIFIED TYPE (HCC): ICD-10-CM

## 2019-09-17 DIAGNOSIS — Z79.4 CONTROLLED TYPE 2 DIABETES MELLITUS WITHOUT COMPLICATION, WITH LONG-TERM CURRENT USE OF INSULIN (HCC): Primary | ICD-10-CM

## 2019-09-17 DIAGNOSIS — K59.81 OGILVIE'S SYNDROME: ICD-10-CM

## 2019-09-17 PROCEDURE — 99315 NF DSCHRG MGMT 30 MIN/LESS: CPT | Performed by: FAMILY MEDICINE

## 2019-09-18 ASSESSMENT — ENCOUNTER SYMPTOMS
DIARRHEA: 0
NAUSEA: 0
ABDOMINAL PAIN: 0
SHORTNESS OF BREATH: 0
COUGH: 0
WHEEZING: 0
CHEST TIGHTNESS: 0
CONSTIPATION: 0

## 2019-09-20 ENCOUNTER — TELEPHONE (OUTPATIENT)
Dept: FAMILY MEDICINE CLINIC | Age: 59
End: 2019-09-20

## 2019-09-23 RX ORDER — SENNA PLUS 8.6 MG/1
2 TABLET ORAL 2 TIMES DAILY
Qty: 120 TABLET | Refills: 0 | Status: SHIPPED | OUTPATIENT
Start: 2019-09-23 | End: 2019-10-23

## 2019-09-23 RX ORDER — OLANZAPINE 5 MG/1
5 TABLET ORAL NIGHTLY
Qty: 30 TABLET | Refills: 0 | Status: SHIPPED | OUTPATIENT
Start: 2019-09-23 | End: 2020-07-23

## 2019-09-25 NOTE — TELEPHONE ENCOUNTER
Steve Levin from [facility] called about her meds. I explained they were sent to [facility]. She stated ok she will talk to [address] and then she stated [address] was giving her self 40 units of insulin daily. She tried to explain it says to take 10 not 40 units. I told her on our list it says 10 units.  She also stated that [facility] don't have grab bars or shower chairs and wondered if we could send script to [address] [place] [address] DME

## 2019-09-26 ENCOUNTER — TELEPHONE (OUTPATIENT)
Dept: UROLOGY | Age: 59
End: 2019-09-26

## 2019-09-27 ENCOUNTER — HOSPITAL ENCOUNTER (OUTPATIENT)
Dept: LAB | Age: 59
Discharge: HOME | End: 2019-09-27
Payer: MEDICARE

## 2019-09-27 LAB
ABSOLUTE EOS #: 0.2 K/UL (ref 0–0.4)
ABSOLUTE IMMATURE GRANULOCYTE: ABNORMAL K/UL (ref 0–0.3)
ABSOLUTE LYMPH #: 2.4 K/UL (ref 1–4.8)
ABSOLUTE MONO #: 0.6 K/UL (ref 0.1–1.2)
ALBUMIN SERPL-MCNC: 4.5 G/DL (ref 3.5–5.2)
ALBUMIN/GLOBULIN RATIO: 1.5 (ref 1–2.5)
ALP BLD-CCNC: 107 U/L (ref 35–104)
ALT SERPL-CCNC: 36 U/L (ref 5–33)
ANION GAP SERPL CALCULATED.3IONS-SCNC: 15 MMOL/L (ref 9–17)
AST SERPL-CCNC: 26 U/L
BASOPHILS # BLD: 1 % (ref 0–1)
BASOPHILS ABSOLUTE: 0.1 K/UL (ref 0–0.2)
BILIRUB SERPL-MCNC: 0.22 MG/DL (ref 0.3–1.2)
BUN BLDV-MCNC: 8 MG/DL (ref 6–20)
BUN/CREAT BLD: 13 (ref 9–20)
CALCIUM SERPL-MCNC: 9.4 MG/DL (ref 8.6–10.4)
CHLORIDE BLD-SCNC: 99 MMOL/L (ref 98–107)
CHOLESTEROL/HDL RATIO: 6.3
CHOLESTEROL: 250 MG/DL
CO2: 24 MMOL/L (ref 20–31)
CREAT SERPL-MCNC: 0.62 MG/DL (ref 0.5–0.9)
DIFFERENTIAL TYPE: ABNORMAL
EOSINOPHILS RELATIVE PERCENT: 3 % (ref 1–7)
GFR AFRICAN AMERICAN: >60 ML/MIN
GFR NON-AFRICAN AMERICAN: >60 ML/MIN
GFR SERPL CREATININE-BSD FRML MDRD: ABNORMAL ML/MIN/{1.73_M2}
GFR SERPL CREATININE-BSD FRML MDRD: ABNORMAL ML/MIN/{1.73_M2}
GLUCOSE BLD-MCNC: 148 MG/DL (ref 70–99)
HCT VFR BLD CALC: 38.6 % (ref 36–46)
HDLC SERPL-MCNC: 40 MG/DL
HEMOGLOBIN: 13 G/DL (ref 12–16)
IMMATURE GRANULOCYTES: ABNORMAL %
LDL CHOLESTEROL: 139 MG/DL (ref 0–130)
LYMPHOCYTES # BLD: 26 % (ref 16–46)
MCH RBC QN AUTO: 30 PG (ref 26–34)
MCHC RBC AUTO-ENTMCNC: 33.7 G/DL (ref 31–37)
MCV RBC AUTO: 89.1 FL (ref 80–100)
MONOCYTES # BLD: 6 % (ref 4–11)
NRBC AUTOMATED: ABNORMAL PER 100 WBC
PDW BLD-RTO: 15.4 % (ref 11–14.5)
PLATELET # BLD: 351 K/UL (ref 140–450)
PLATELET ESTIMATE: ABNORMAL
PMV BLD AUTO: 7.6 FL (ref 6–12)
POTASSIUM SERPL-SCNC: 4.2 MMOL/L (ref 3.7–5.3)
RBC # BLD: 4.33 M/UL (ref 4–5.2)
RBC # BLD: ABNORMAL 10*6/UL
SEG NEUTROPHILS: 64 % (ref 43–77)
SEGMENTED NEUTROPHILS ABSOLUTE COUNT: 6 K/UL (ref 1.8–7.7)
SODIUM BLD-SCNC: 138 MMOL/L (ref 135–144)
THYROXINE, FREE: 1.11 NG/DL (ref 0.93–1.7)
TOTAL PROTEIN: 7.5 G/DL (ref 6.4–8.3)
TRIGL SERPL-MCNC: 353 MG/DL
TSH SERPL DL<=0.05 MIU/L-ACNC: 4.14 MIU/L (ref 0.3–5)
VLDLC SERPL CALC-MCNC: ABNORMAL MG/DL (ref 1–30)
WBC # BLD: 9.2 K/UL (ref 3.5–11)
WBC # BLD: ABNORMAL 10*3/UL

## 2019-09-27 PROCEDURE — 36415 COLL VENOUS BLD VENIPUNCTURE: CPT

## 2019-09-27 PROCEDURE — 85025 COMPLETE CBC W/AUTO DIFF WBC: CPT

## 2019-09-27 PROCEDURE — 80053 COMPREHEN METABOLIC PANEL: CPT

## 2019-09-27 PROCEDURE — 80061 LIPID PANEL: CPT

## 2019-09-27 PROCEDURE — 84443 ASSAY THYROID STIM HORMONE: CPT

## 2019-09-27 PROCEDURE — 84439 ASSAY OF FREE THYROXINE: CPT

## 2019-10-23 ENCOUNTER — HOSPITAL ENCOUNTER (OUTPATIENT)
Dept: GENERAL RADIOLOGY | Age: 59
Discharge: HOME | End: 2019-10-25
Payer: MEDICARE

## 2019-10-23 DIAGNOSIS — M25.511 RIGHT SHOULDER PAIN, UNSPECIFIED CHRONICITY: ICD-10-CM

## 2019-10-23 PROCEDURE — 73030 X-RAY EXAM OF SHOULDER: CPT

## 2019-10-23 RX ORDER — OLANZAPINE 5 MG/1
TABLET ORAL
Qty: 30 TABLET | Refills: 0 | OUTPATIENT
Start: 2019-10-23

## 2019-12-10 ENCOUNTER — OFFICE VISIT (OUTPATIENT)
Dept: PRIMARY CARE CLINIC | Age: 59
End: 2019-12-10
Payer: MEDICARE

## 2019-12-10 VITALS
WEIGHT: 216 LBS | RESPIRATION RATE: 18 BRPM | BODY MASS INDEX: 35.99 KG/M2 | TEMPERATURE: 97 F | OXYGEN SATURATION: 93 % | SYSTOLIC BLOOD PRESSURE: 124 MMHG | HEIGHT: 65 IN | HEART RATE: 72 BPM | DIASTOLIC BLOOD PRESSURE: 70 MMHG

## 2019-12-10 DIAGNOSIS — M25.511 CHRONIC PAIN OF BOTH SHOULDERS: Primary | ICD-10-CM

## 2019-12-10 DIAGNOSIS — G89.29 CHRONIC PAIN OF BOTH SHOULDERS: Primary | ICD-10-CM

## 2019-12-10 DIAGNOSIS — M25.512 CHRONIC PAIN OF BOTH SHOULDERS: Primary | ICD-10-CM

## 2019-12-10 PROCEDURE — 3017F COLORECTAL CA SCREEN DOC REV: CPT | Performed by: FAMILY MEDICINE

## 2019-12-10 PROCEDURE — G8484 FLU IMMUNIZE NO ADMIN: HCPCS | Performed by: FAMILY MEDICINE

## 2019-12-10 PROCEDURE — 1036F TOBACCO NON-USER: CPT | Performed by: FAMILY MEDICINE

## 2019-12-10 PROCEDURE — 20610 DRAIN/INJ JOINT/BURSA W/O US: CPT | Performed by: FAMILY MEDICINE

## 2019-12-10 PROCEDURE — G8417 CALC BMI ABV UP PARAM F/U: HCPCS | Performed by: FAMILY MEDICINE

## 2019-12-10 PROCEDURE — G8427 DOCREV CUR MEDS BY ELIG CLIN: HCPCS | Performed by: FAMILY MEDICINE

## 2019-12-10 RX ORDER — TRIAMCINOLONE ACETONIDE 40 MG/ML
40 INJECTION, SUSPENSION INTRA-ARTICULAR; INTRAMUSCULAR ONCE
Status: COMPLETED | OUTPATIENT
Start: 2019-12-10 | End: 2019-12-10

## 2019-12-10 RX ORDER — LEVOTHYROXINE SODIUM 0.03 MG/1
25 TABLET ORAL DAILY
COMMUNITY
Start: 2019-11-08

## 2019-12-10 RX ORDER — GLIMEPIRIDE 2 MG/1
2 TABLET ORAL
COMMUNITY
Start: 2019-11-08

## 2019-12-10 RX ORDER — LISINOPRIL 2.5 MG/1
2.5 TABLET ORAL DAILY
COMMUNITY
Start: 2019-11-08

## 2019-12-10 RX ORDER — ATORVASTATIN CALCIUM 20 MG/1
20 TABLET, FILM COATED ORAL DAILY
COMMUNITY
Start: 2019-11-12

## 2019-12-10 RX ADMIN — TRIAMCINOLONE ACETONIDE 40 MG: 40 INJECTION, SUSPENSION INTRA-ARTICULAR; INTRAMUSCULAR at 11:43

## 2019-12-10 RX ADMIN — TRIAMCINOLONE ACETONIDE 40 MG: 40 INJECTION, SUSPENSION INTRA-ARTICULAR; INTRAMUSCULAR at 11:44

## 2019-12-10 ASSESSMENT — PATIENT HEALTH QUESTIONNAIRE - PHQ9
1. LITTLE INTEREST OR PLEASURE IN DOING THINGS: 0
SUM OF ALL RESPONSES TO PHQ9 QUESTIONS 1 & 2: 0
2. FEELING DOWN, DEPRESSED OR HOPELESS: 0
SUM OF ALL RESPONSES TO PHQ QUESTIONS 1-9: 0
SUM OF ALL RESPONSES TO PHQ QUESTIONS 1-9: 0

## 2019-12-10 ASSESSMENT — ENCOUNTER SYMPTOMS
BACK PAIN: 0
RESPIRATORY NEGATIVE: 1
EYES NEGATIVE: 1
GASTROINTESTINAL NEGATIVE: 1

## 2019-12-17 ENCOUNTER — HOSPITAL ENCOUNTER (OUTPATIENT)
Dept: PHYSICAL THERAPY | Age: 59
Setting detail: THERAPIES SERIES
Discharge: HOME | End: 2019-12-17
Payer: MEDICARE

## 2019-12-17 PROCEDURE — 97110 THERAPEUTIC EXERCISES: CPT | Performed by: PHYSICAL THERAPIST

## 2019-12-17 PROCEDURE — 97161 PT EVAL LOW COMPLEX 20 MIN: CPT | Performed by: PHYSICAL THERAPIST

## 2019-12-17 ASSESSMENT — PAIN DESCRIPTION - ORIENTATION: ORIENTATION: RIGHT;LEFT

## 2019-12-17 ASSESSMENT — PAIN - FUNCTIONAL ASSESSMENT: PAIN_FUNCTIONAL_ASSESSMENT: PREVENTS OR INTERFERES SOME ACTIVE ACTIVITIES AND ADLS

## 2019-12-17 ASSESSMENT — PAIN DESCRIPTION - DESCRIPTORS: DESCRIPTORS: ACHING;TIGHTNESS

## 2019-12-17 ASSESSMENT — PAIN DESCRIPTION - ONSET: ONSET: PROGRESSIVE

## 2019-12-17 ASSESSMENT — PAIN DESCRIPTION - LOCATION: LOCATION: SHOULDER

## 2019-12-17 ASSESSMENT — PAIN DESCRIPTION - FREQUENCY: FREQUENCY: INTERMITTENT

## 2019-12-17 ASSESSMENT — PAIN DESCRIPTION - PROGRESSION: CLINICAL_PROGRESSION: GRADUALLY IMPROVING

## 2019-12-17 ASSESSMENT — PAIN DESCRIPTION - PAIN TYPE: TYPE: CHRONIC PAIN

## 2019-12-23 ENCOUNTER — HOSPITAL ENCOUNTER (OUTPATIENT)
Dept: PHYSICAL THERAPY | Age: 59
Setting detail: THERAPIES SERIES
Discharge: HOME | End: 2019-12-23
Payer: MEDICARE

## 2019-12-23 PROCEDURE — 97140 MANUAL THERAPY 1/> REGIONS: CPT

## 2019-12-23 PROCEDURE — 97110 THERAPEUTIC EXERCISES: CPT

## 2019-12-27 ENCOUNTER — HOSPITAL ENCOUNTER (OUTPATIENT)
Dept: PHYSICAL THERAPY | Age: 59
Setting detail: THERAPIES SERIES
Discharge: HOME | End: 2019-12-27
Payer: MEDICARE

## 2019-12-27 PROCEDURE — 97110 THERAPEUTIC EXERCISES: CPT

## 2020-01-06 ENCOUNTER — HOSPITAL ENCOUNTER (OUTPATIENT)
Dept: PHYSICAL THERAPY | Age: 60
Setting detail: THERAPIES SERIES
Discharge: HOME | End: 2020-01-06
Payer: MEDICARE

## 2020-01-06 PROCEDURE — 97110 THERAPEUTIC EXERCISES: CPT

## 2020-01-06 NOTE — FLOWSHEET NOTE
Physical Therapy Daily Treatment Note    Date:  2020    Patient Name:  Ej Fletcher  \"Ilda\"  :  1960  MRN: [id number]  Restrictions/Precautions:     Medical/Treatment Diagnosis Information:   · Diagnosis: M25.511, M25.512, G89.29 chronic B shld pain  · Treatment Diagnosis: M25.511, M25.512 B shld pain  Insurance/Certification information:  PT Insurance Information: [facility]  Physician Information:  Referring Practitioner: Geoffrey Feliz of care signed (Y/N):  Y  Visit# / total visits:  4/10  Pain level: /10     Time In: 3:31   Time Out: 4:22    Progress Note: []  Yes  [x]  No  Next due by: Visit #10, or 20      Subjective:    Presents this date with her . Patient reports she doesn't have much issue with putting on her coat. However she continues to have difficulty with drying her back with a towel due to B shoulder pain. She admits that she is not always performing all of her exercises. Objective: Stressed the importance/benefits of compliance with her HEP. Observation:   Moderate fwd head/kyphotic shoulders posture  Follows Commands: WFL, verbal/tactile and demonstration cues provided to proper technique. Limited in PROM due to pain.   Test measurements:    R AROM: Flex 123 deg, abd 132  L AROM: Flex 116 deg, abd 129    Exercises: there ex for ROM, flexibility  Exercise/Equipment Resistance/Repetitions Other comments   Pulley 5'    Wand x4 15x    Int rot stretch 10x5\"    Finger ladder 5x R Lvl 27, L Lvl 26   Ball up wall 10x    Rows/Ext grn x15 ea         Pect stretch -supine 5' On 2 HP covers        Ext rot in sidely 15x     Abd in sidely 15x              ROM B shld 8'         [x] Provided verbal/tactile cueing for activities related to strengthening, flexibility, endurance, ROM. (27532)  [] Provided verbal/tactile cueing for activities related to improving balance, coordination, kinesthetic sense, posture, motor skill, proprioception. (15421)    Therapeutic Activities:     [] Therapeutic activities, direct (one-on-one) patient contact (use of dynamic activities to improve functional performance). (20045)    Gait:   [] Provided training and instruction to the patient for ambulation re-education. (75768)    Self-Care/ADL's  [] Self-care/home management training and compensatory training, meal preparation, safety procedures, and instructions in use of assistive technology devices/adaptive equipment, direct one-on-one contact. (90322)    Home Exercise Program:   Tano ex x4  [x] Reviewed/Progressed HEP activities related to strengthening, flexibility, endurance, ROM. (71268)  [] Reviewed/Progressed HEP activities related to improving balance, coordination, kinesthetic sense, posture, motor skill, proprioception.  (21489)    Manual Treatments:    [] Provided manual therapy to mobilize soft tissue/joints for the purpose of modulating pain, promoting relaxation,  increasing ROM, reducing/eliminating soft tissue swelling/inflammation/restriction, improving soft tissue extensibility. (64220)    Service Based Modalities:      Timed Code Treatment Minutes:    There ex 51'    Total Treatment Minutes:   46'    Treatment/Activity Tolerance:  [x] Patient tolerated treatment well [] Patient limited by fatique  [] Patient limited by pain  [] Patient limited by other medical complications  [] Other:     Prognosis: [x] Good [] Fair  [] Poor    Patient Requires Follow-up: [x] Yes  [] No    Goals:  Short term goals  Time Frame for Short term goals: 1 week  Short term goal 1: Start HEP met    Long term goals  Time Frame for Long term goals : 4 weeks  Long term goal 1: Pain controlled 2/10 during activirt  Long term goal 2: AROM behind the back to L3 for dressing and personal hygiene  Long term goal 3: AROM flex & abd to 150 deg for hair care and cleaning  Long term goal 4: Able to put arms in coat sleeve without assist    Plan:   [x] Continue per plan of care [] Alter current plan (see comments)  [] Plan of care initiated [] Hold pending MD visit [] Discharge    Plan for Next Session:  Progress as tolerated.      Electronically signed by:  Merlinda Spittle

## 2020-01-08 ENCOUNTER — HOSPITAL ENCOUNTER (OUTPATIENT)
Dept: LAB | Age: 60
Discharge: HOME | End: 2020-01-08
Payer: MEDICARE

## 2020-01-08 ENCOUNTER — HOSPITAL ENCOUNTER (OUTPATIENT)
Dept: PHYSICAL THERAPY | Age: 60
Setting detail: THERAPIES SERIES
Discharge: HOME | End: 2020-01-08
Payer: MEDICARE

## 2020-01-08 LAB
ESTIMATED AVERAGE GLUCOSE: 197 MG/DL
HBA1C MFR BLD: 8.5 % (ref 4.8–5.9)

## 2020-01-08 PROCEDURE — 97110 THERAPEUTIC EXERCISES: CPT

## 2020-01-08 PROCEDURE — 83036 HEMOGLOBIN GLYCOSYLATED A1C: CPT

## 2020-01-08 PROCEDURE — 36415 COLL VENOUS BLD VENIPUNCTURE: CPT

## 2020-01-08 PROCEDURE — 97140 MANUAL THERAPY 1/> REGIONS: CPT

## 2020-01-08 NOTE — FLOWSHEET NOTE
Physical Therapy Daily Treatment Note    Date:  2020    Patient Name:  Jamal Ridley  \"Ilda\"  :  1960  MRN: [id number]  Restrictions/Precautions:     Medical/Treatment Diagnosis Information:   · Diagnosis: M25.511, M25.512, G89.29 chronic B shld pain  · Treatment Diagnosis: M25.511, M25.512 B shld pain  Insurance/Certification information:  PT Insurance Information: [facility]  Physician Information:  Referring Practitioner: Gregor Hernandez of care signed (Y/N):  Y  Visit# / total visits:  5/10  Pain level: 5/10     Time In: 3:33   Time Out: 4:30    Progress Note: []  Yes  [x]  No  Next due by: Visit #10, or 20      Subjective: Pt rpts to clinic with moderate complaints of B shoulder pain stating \"Both shoulders hurt today. Its not as bad as it has been. I have been doing the exercises more now\". Objective: Pt tolerated todays session well indicating no increase in pain post session. Pt was able to perform all SHERIF per flow chart with with vc required for proper performance. Most challenged by manual passive ROM specifically abduction this date. Able to progress multiple exercises with good tolerance. Observation:     Moderate fwd head/kyphotic shoulders posture  Follows Commands: WFL, verbal/tactile and demonstration cues provided to proper technique. Limited in PROM due to pain.   Test measurements:    R AROM: Flex 123 deg, abd 132  L AROM: Flex 116 deg, abd 129    Exercises: there ex for ROM, flexibility  Exercise/Equipment Resistance/Repetitions Other comments   Pulley 5'    Wand x4 15x    Int rot stretch 10x5\"    Finger ladder 5x R Lvl 27, L Lvl 26   Ball up wall 10x    Rows/Ext grn x15 ea    B shld shrug/retrac x20    Pect stretch -supine 5' On 2 HP covers        Ext rot in sidely 15x     Abd in sidely 15x              ROM B shld 15'         [x] Provided verbal/tactile cueing for activities related to strengthening, flexibility, endurance, ROM. (20438)  [] Provided verbal/tactile cueing for activities related to improving balance, coordination, kinesthetic sense, posture, motor skill, proprioception. (41019)    Therapeutic Activities:     [] Therapeutic activities, direct (one-on-one) patient contact (use of dynamic activities to improve functional performance). (01582)    Gait:   [] Provided training and instruction to the patient for ambulation re-education. (23387)    Self-Care/ADL's  [] Self-care/home management training and compensatory training, meal preparation, safety procedures, and instructions in use of assistive technology devices/adaptive equipment, direct one-on-one contact. (41176)    Home Exercise Program:   [place] ex x4  [x] Reviewed/Progressed HEP activities related to strengthening, flexibility, endurance, ROM. (97834)  [] Reviewed/Progressed HEP activities related to improving balance, coordination, kinesthetic sense, posture, motor skill, proprioception.  (48323)    Manual Treatments:  Manual B shoulder PROM x 15'  [x] Provided manual therapy to mobilize soft tissue/joints for the purpose of modulating pain, promoting relaxation,  increasing ROM, reducing/eliminating soft tissue swelling/inflammation/restriction, improving soft tissue extensibility. (06737)    Service Based Modalities:      Timed Code Treatment Minutes:    There ex 42'       Manual 15'    Total Treatment Minutes:   62'    Treatment/Activity Tolerance:  [x] Patient tolerated treatment well [] Patient limited by fatique  [] Patient limited by pain  [] Patient limited by other medical complications  [] Other:     Prognosis: [x] Good [] Fair  [] Poor    Patient Requires Follow-up: [x] Yes  [] No    Goals:  Short term goals  Time Frame for Short term goals: 1 week  Short term goal 1: Start HEP met    Long term goals  Time Frame for Long term goals : 4 weeks   Long term goal 1: Pain controlled 2/10 during activist  Long term goal 2: AROM behind the back to L3 for dressing and personal hygiene  Long term goal 3: AROM flex &

## 2020-01-13 ENCOUNTER — HOSPITAL ENCOUNTER (OUTPATIENT)
Dept: PHYSICAL THERAPY | Age: 60
Setting detail: THERAPIES SERIES
Discharge: HOME | End: 2020-01-13
Payer: MEDICARE

## 2020-01-13 PROCEDURE — 97110 THERAPEUTIC EXERCISES: CPT

## 2020-01-17 ENCOUNTER — HOSPITAL ENCOUNTER (OUTPATIENT)
Dept: PHYSICAL THERAPY | Age: 60
Setting detail: THERAPIES SERIES
Discharge: HOME | End: 2020-01-17
Payer: MEDICARE

## 2020-01-17 PROCEDURE — 97110 THERAPEUTIC EXERCISES: CPT

## 2020-01-17 NOTE — FLOWSHEET NOTE
Physical Therapy Daily Treatment Note    Date:  2020    Patient Name:  Ej Fletcher  \"Ilda\"  :  1960  MRN: [id number]  Restrictions/Precautions:     Medical/Treatment Diagnosis Information:   · Diagnosis: M25.511, M25.512, G89.29 chronic B shld pain  · Treatment Diagnosis: M25.511, M25.512 B shld pain  Insurance/Certification information:  PT Insurance Information: [facility]  Physician Information:  Referring Practitioner: Geoffrey Feliz of care signed (Y/N):  Y  Visit# / total visits:  7/10  Pain level: 3/10     Time In: 2:32   Time Out: 3:35    Progress Note: []  Yes  [x]  No  Next due by: Visit #10, or 20      Subjective: Pt rpts to clinic with B complaints of mild shoulder pain stating \"I feel like I can do more than I have been able to. Everything is feeling pretty good. Just a slight ache\". Objective: Pt tolerated todays session well indicating some increased pain with stretches particularly IR stretch this date. Pt was able to progress multiple exercises with vc required for proper performance. Most challenged by manual PROM this date noting increased pain with passive abduction this date. Observation:     Moderate fwd head/kyphotic shoulders posture  Follows Commands: WFL, verbal/tactile and demonstration cues provided to proper technique. Limited in PROM due to pain.   Test measurements:    R AROM: Flex 150 deg, abd 138 IR: R SI jt ER: C7 (13JAN)  L AROM: Flex 150 deg, abd 140 IR: L buttock ER: C7 (13JAN)    Exercises: there ex for ROM, flexibility  Exercise/Equipment Resistance/Repetitions Other comments   Pulley 5'    Wand x4 20x    Int rot stretch 10x5\"    Finger ladder 5x R Lvl 28, L Lvl 28   Ball up wall 15x    Rows/Ext gisella x20 ea    B shld shrug/retrac x20 2#   Pect stretch -supine 5' 1/2 foam   Walk backs 10x5\"    Ext rot in sidely 15x 2#    Abd in sidely 15x 2#             ROM B shld 10'         [x] Provided verbal/tactile cueing for activities related to strengthening, behind the back to L3 for dressing and personal hygiene  Long term goal 3: AROM flex & abd to 150 deg for hair care and cleaning  Long term goal 4: Able to put arms in coat sleeve without assist    Plan:   [x] Continue per plan of care [] Alter current plan (see comments)  [] Plan of care initiated [] Hold pending MD visit [] Discharge    Plan for Next Session:  Test all goals next session.      Electronically signed by:  Renee Bell PTA

## 2020-01-20 NOTE — PROGRESS NOTES
I have reviewed and agree to the content of the note written by the PTA.   Electronically signed by Ayala Barron PT [id number]

## 2020-01-21 ENCOUNTER — HOSPITAL ENCOUNTER (OUTPATIENT)
Dept: PHYSICAL THERAPY | Age: 60
Setting detail: THERAPIES SERIES
Discharge: HOME | End: 2020-01-21
Payer: MEDICARE

## 2020-01-21 PROCEDURE — 97110 THERAPEUTIC EXERCISES: CPT | Performed by: PHYSICAL THERAPY ASSISTANT

## 2020-01-21 NOTE — FLOWSHEET NOTE
ROM B shld          [x] Provided verbal/tactile cueing for activities related to strengthening, flexibility, endurance, ROM. (92119)  [] Provided verbal/tactile cueing for activities related to improving balance, coordination, kinesthetic sense, posture, motor skill, proprioception. (28725)    Therapeutic Activities:     [] Therapeutic activities, direct (one-on-one) patient contact (use of dynamic activities to improve functional performance). (16220)    Gait:   [] Provided training and instruction to the patient for ambulation re-education. (42119)    Self-Care/ADL's  [] Self-care/home management training and compensatory training, meal preparation, safety procedures, and instructions in use of assistive technology devices/adaptive equipment, direct one-on-one contact. (87707)    Home Exercise Program:   Wand ex x4  [x] Reviewed/Progressed HEP activities related to strengthening, flexibility, endurance, ROM. (58352)  [] Reviewed/Progressed HEP activities related to improving balance, coordination, kinesthetic sense, posture, motor skill, proprioception.  (27996)    Manual Treatments:  Manual B shoulder PROM x 10'  [x] Provided manual therapy to mobilize soft tissue/joints for the purpose of modulating pain, promoting relaxation,  increasing ROM, reducing/eliminating soft tissue swelling/inflammation/restriction, improving soft tissue extensibility. (63817)    Service Based Modalities:      Timed Code Treatment Minutes:    There ex 55           Total Treatment Minutes:  55    Treatment/Activity Tolerance:  [x] Patient tolerated treatment well [] Patient limited by fatique  [] Patient limited by pain  [] Patient limited by other medical complications  [] Other:     Prognosis: [x] Good [] Fair  [] Poor    Patient Requires Follow-up: [x] Yes  [] No    Goals:  Short term goals  Time Frame for Short term goals: 1 week   Short term goal 1: Start HEP met    Long term goals  Time Frame for Long term goals : 4 weeks Long term goal 1: Pain controlled 2/10 during activist (Pain at worst rated 2-3/10)  Long term goal 2: AROM behind the back to L3 for dressing and personal hygiene (PSIS bilateral)  Long term goal 3: AROM flex & abd to 150 deg for hair care and cleaning (See above)  Long term goal 4: Able to put arms in coat sleeve without assist (Able to don and doff jacket without issue)    Plan:   [x] Continue per plan of care [] Alter current plan (see comments)  [] Plan of care initiated [] Hold pending MD visit [] Discharge    Plan for Next Session:  Continue to progress strength and stability. Monitor tolerance and advance as able. Electronically signed by:   Ramy Chavira PTA

## 2020-01-22 NOTE — PLAN OF CARE
[facility]    [x] [place]  Phone: [phone]  Fax: [phone]      [] [place]  Phone: [phone]  Fax: [phone]    Physical Therapy Progress Note  Date: 2020        Patient Name:  Cooper Tao    :  1960  MRN: [id number]  Restrictions/Precautions:      Medical/Treatment Diagnosis Information:  ·   Diagnosis: M25.511, M25.512, G89.29 chronic B shld pain  · Treatment Diagnosis: M25.511, M25.512 B shld pain  ·    Insurance/Certification information:    [facility]  Physician Information:    Nikhil Vallejo of care signed (Y/N): y  Visit# / total visits:  8  Pain level: 310       Time Period for Report:   Cancels/No-shows to date:      Plan of Care/Treatment to date:  [] Therapeutic Exercise    [] Modalities:  [] Therapeutic Activity     [] Ultrasound  [] Electrical Stimulation  [] Gait Training      [] Cervical Traction    [] Lumbar Traction  [] Neuromuscular Re-education  [] Cold/hotpack [] Iontophoresis  [] Instruction in HEP      Other:  [] Manual Therapy       []    [] Aquatic Therapy       []                    ? Subjective:    Pt. Relates pain this date rated 2/10 through shoulders this date. Pt, relates falling on back due to ice over weekend. Did not seek additional medication attention due to fall. Objective:  ·   Test measurements:    · R AROM: Flex 132 deg, abd 140 IR: PSIS ER: C7 (13JAN)  L AROM: Flex 138 deg, abd 140 IR: PSIS ER: C7 (13JAN)       G/H joint capsule tightness.    Functionally severe loss of int rot           Plan:    Continue POC       Goals:    Short term goals  Time Frame for Short term goals: 1 week   Short term goal 1: Start HEP met     Long term goals  Time Frame for Long term goals : 4 weeks   Long term goal 1: Pain controlled 2/10 during activist -part met  Long term goal 2: AROM behind the back to L3 for dressing and personal hygiene - not met  Long term goal 3: AROM flex & abd to 150 deg for hair

## 2020-01-22 NOTE — PROGRESS NOTES
I have reviewed and agree to the content of the note written by the PTA.   Electronically signed by Ernie Tejeda PT [id number]

## 2020-01-24 ENCOUNTER — HOSPITAL ENCOUNTER (OUTPATIENT)
Dept: PHYSICAL THERAPY | Age: 60
Setting detail: THERAPIES SERIES
Discharge: HOME | End: 2020-01-24
Payer: MEDICARE

## 2020-01-24 PROCEDURE — 97110 THERAPEUTIC EXERCISES: CPT

## 2020-01-24 NOTE — FLOWSHEET NOTE
Physical Therapy Daily Treatment Note    Date:  2020    Patient Name:  Maribell Apple  \"Ilda\"  :  1960  MRN: [id number]  Restrictions/Precautions:     Medical/Treatment Diagnosis Information:   · Diagnosis: M25.511, M25.512, G89.29 chronic B shld pain  · Treatment Diagnosis: M25.511, M25.512 B shld pain  Insurance/Certification information:  PT Insurance Information: [facility]  Physician Information:  Referring Practitioner: Abilio Share of care signed (Y/N):  Y  Visit# / total visits:  1/10 2nd POC 9 total   Pain level: 1/10     Time In: 3:02   Time Out: 3:47    Progress Note: []  Yes  [x]  No  Next due by: Visit #10, or 20      Subjective: Pt. Relates pain this date rated 1/10 more so in the L shoulder. Objective: SHERIF complete per flow chart to facilitate strength, motion, mobility to allow ease with daily activities and self care ADL's. Updated HEP with written and verbal instructions this date. Verbal cuing for progression and technique with exercises. Difficulty with end range motion remains. Observation:     Moderate fwd head/kyphotic shoulders posture  Follows Commands: WFL, verbal/tactile and demonstration cues provided to proper technique.    Pain with PROM      Test measurements:    R AROM: Flex 132 deg, abd 140 IR: PSIS ER: C7 (13JAN)  L AROM: Flex 138 deg, abd 140 IR: PSIS ER: C7 (13JAN)    Exercises: there ex for ROM, flexibility  Exercise/Equipment Resistance/Repetitions Other comments   Pulley 5'    Wand x4 20x    Int rot stretch 10x5\"    Finger ladder 5x R Lvl 28, L Lvl 28   Ball up wall3:47 15x    Rows/Ext gisella x20 ea    B shld shrug/retrac x20 2#   Pect stretch -supine 5' 1/2 foam   Walk backs 10x5\"    Ext rot in sidely 15x 2#    Abd in sidely 15x 2#             ROM B shld          [x] Provided verbal/tactile cueing for activities related to strengthening, flexibility, endurance, ROM. (98935)  [] Provided verbal/tactile cueing for activities related to improving balance, coordination, kinesthetic sense, posture, motor skill, proprioception. (78650)    Therapeutic Activities:     [] Therapeutic activities, direct (one-on-one) patient contact (use of dynamic activities to improve functional performance). (57671)    Gait:   [] Provided training and instruction to the patient for ambulation re-education. (35946)    Self-Care/ADL's  [] Self-care/home management training and compensatory training, meal preparation, safety procedures, and instructions in use of assistive technology devices/adaptive equipment, direct one-on-one contact. (65421)    Home Exercise Program:   Wand ex x4  [x] Reviewed/Progressed HEP activities related to strengthening, flexibility, endurance, ROM. (46706)  [] Reviewed/Progressed HEP activities related to improving balance, coordination, kinesthetic sense, posture, motor skill, proprioception.  (45996)    Manual Treatments:  Manual B shoulder PROM x 10'  [x] Provided manual therapy to mobilize soft tissue/joints for the purpose of modulating pain, promoting relaxation,  increasing ROM, reducing/eliminating soft tissue swelling/inflammation/restriction, improving soft tissue extensibility. (04134)    Service Based Modalities:      Timed Code Treatment Minutes:    There ex 45           Total Treatment Minutes:  45    Treatment/Activity Tolerance:  [x] Patient tolerated treatment well [] Patient limited by fatique  [] Patient limited by pain  [] Patient limited by other medical complications  [] Other:     Prognosis: [x] Good [] Fair  [] Poor    Patient Requires Follow-up: [x] Yes  [] No    Goals:  Short term goals  Time Frame for Short term goals: 1 week   Short term goal 1: Start HEP met    Long term goals  Time Frame for Long term goals : 4 weeks   Long term goal 1: Pain controlled 2/10 during activist (Pain at worst rated 2-3/10)  Long term goal 2: AROM behind the back to L3 for dressing and personal hygiene (PSIS bilateral)  Long term goal 3: AROM flex & abd to 150 deg for hair care and cleaning (See above)  Long term goal 4: Able to put arms in coat sleeve without assist (Able to don and doff jacket without issue)    Plan:   [x] Continue per plan of care [] Alter current plan (see comments)  [] Plan of care initiated [] Hold pending MD visit [] Discharge    Plan for Next Session:  Continue to progress strength and stability. Monitor tolerance and advance as able.       Electronically signed by:  Ruben Jones PTA

## 2020-01-29 ENCOUNTER — HOSPITAL ENCOUNTER (OUTPATIENT)
Dept: PHYSICAL THERAPY | Age: 60
Setting detail: THERAPIES SERIES
Discharge: HOME | End: 2020-01-29
Payer: MEDICARE

## 2020-01-29 PROCEDURE — 97110 THERAPEUTIC EXERCISES: CPT

## 2020-01-29 NOTE — FLOWSHEET NOTE
Physical Therapy Daily Treatment Note    Date:  2020    Patient Name:  Winston Chamorro  \"Ilda\"  :  1960  MRN: [id number]  Restrictions/Precautions:     Medical/Treatment Diagnosis Information:   · Diagnosis: M25.511, M25.512, G89.29 chronic B shld pain  · Treatment Diagnosis: M25.511, M25.512 B shld pain  Insurance/Certification information:  PT Insurance Information: [facility]  Physician Information:  Referring Practitioner: Bri Marin of care signed (Y/N):  Y  Visit# / total visits:   2nd POC 10 total   Pain level: 2/10     Time In: 3:35   Time Out: 4:23    Progress Note: []  Yes  [x]  No  Next due by: Visit #10, or 20      Subjective: Patient reports overall improvement compared to a couple weeks ago. Objective: SHERIF complete per flow chart to facilitate strength, motion, mobility to allow ease with daily activities and self care ADL's. Updated HEP with written and verbal instructions this date. Verbal cuing for progression and technique with exercises. Difficulty with end range motion remains. Observation:     Moderate fwd head/kyphotic shoulders posture  Follows Commands: WFL, verbal/tactile and demonstration cues provided to proper technique. Requires verbal encouragement to achieve full stretch    Test measurements:    R AROM: Flex 135 deg, abd 142 IR: PSIS ER: C7 (29JAN)  L AROM: Flex 141 deg, abd 141 IR: PSIS ER: C7 (29JAN)    Exercises: there ex for ROM, flexibility  Exercise/Equipment Resistance/Repetitions Other comments   Pulley 5' Flex + abduction   Wand x4 20x    Ext at counter 5\"x10    Int rot stretch 10x5\"    Finger ladder 5x Flex + abd R Lvl 28, L Lvl 28   Ball up wall 15x    Rows/Ext gisella x20 ea    B shld shrug/retrac x20 2#   Pect stretch -supine 5' 1/2 foam   Walk backs 10x5\"    Ext rot in sidely 15x 2#    Abd in sidely 15x 2#             ROM B shld          [x] Provided verbal/tactile cueing for activities related to strengthening, flexibility, endurance, ROM. (20936)  [] Provided verbal/tactile cueing for activities related to improving balance, coordination, kinesthetic sense, posture, motor skill, proprioception. (84135)    Therapeutic Activities:     [] Therapeutic activities, direct (one-on-one) patient contact (use of dynamic activities to improve functional performance). (72438)    Gait:   [] Provided training and instruction to the patient for ambulation re-education. (94429)    Self-Care/ADL's  [] Self-care/home management training and compensatory training, meal preparation, safety procedures, and instructions in use of assistive technology devices/adaptive equipment, direct one-on-one contact. (66642)    Home Exercise Program:   Wand ex x4  [x] Reviewed/Progressed HEP activities related to strengthening, flexibility, endurance, ROM. (84409)  [] Reviewed/Progressed HEP activities related to improving balance, coordination, kinesthetic sense, posture, motor skill, proprioception.  (99473)    Manual Treatments:  Manual B shoulder PROM x 10'  [x] Provided manual therapy to mobilize soft tissue/joints for the purpose of modulating pain, promoting relaxation,  increasing ROM, reducing/eliminating soft tissue swelling/inflammation/restriction, improving soft tissue extensibility. (81894)    Service Based Modalities:      Timed Code Treatment Minutes:    There ex 48'         Total Treatment Minutes:  50'    Treatment/Activity Tolerance:  [x] Patient tolerated treatment well [] Patient limited by fatique  [] Patient limited by pain  [] Patient limited by other medical complications  [] Other:     Prognosis: [x] Good [] Fair  [] Poor    Patient Requires Follow-up: [x] Yes  [] No    Goals:  Short term goals  Time Frame for Short term goals: 1 week   Short term goal 1: Start HEP met    Long term goals  Time Frame for Long term goals : 4 weeks   Long term goal 1: Pain controlled 2/10 during activist (Pain at worst rated 2-3/10)  Long term goal 2: AROM behind the back to L3

## 2020-01-31 ENCOUNTER — HOSPITAL ENCOUNTER (OUTPATIENT)
Dept: PHYSICAL THERAPY | Age: 60
Setting detail: THERAPIES SERIES
Discharge: HOME | End: 2020-01-31
Payer: MEDICARE

## 2020-01-31 PROCEDURE — 97110 THERAPEUTIC EXERCISES: CPT

## 2020-01-31 NOTE — FLOWSHEET NOTE
coordination, kinesthetic sense, posture, motor skill, proprioception. (37700)    Therapeutic Activities:     [] Therapeutic activities, direct (one-on-one) patient contact (use of dynamic activities to improve functional performance). (83654)    Gait:   [] Provided training and instruction to the patient for ambulation re-education. (51119)    Self-Care/ADL's  [] Self-care/home management training and compensatory training, meal preparation, safety procedures, and instructions in use of assistive technology devices/adaptive equipment, direct one-on-one contact. (89627)    Home Exercise Program:   Wand ex x4  [x] Reviewed/Progressed HEP activities related to strengthening, flexibility, endurance, ROM. (14101)  [] Reviewed/Progressed HEP activities related to improving balance, coordination, kinesthetic sense, posture, motor skill, proprioception.  (24833)    Manual Treatments:  Manual B shoulder PROM x 10'  [x] Provided manual therapy to mobilize soft tissue/joints for the purpose of modulating pain, promoting relaxation,  increasing ROM, reducing/eliminating soft tissue swelling/inflammation/restriction, improving soft tissue extensibility. (58353)    Service Based Modalities:      Timed Code Treatment Minutes:    There ex 46'         Total Treatment Minutes:  55'    Treatment/Activity Tolerance:  [x] Patient tolerated treatment well [] Patient limited by fatique  [] Patient limited by pain  [] Patient limited by other medical complications  [] Other:     Prognosis: [x] Good [] Fair  [] Poor    Patient Requires Follow-up: [x] Yes  [] No    Goals:  Short term goals  Time Frame for Short term goals: 1 week   Short term goal 1: Start HEP met    Long term goals  Time Frame for Long term goals : 4 weeks   Long term goal 1: Pain controlled 2/10 during activist (Pain at worst rated 2-3/10)  Long term goal 2: AROM behind the back to L3 for dressing and personal hygiene (PSIS bilateral)  Long term goal 3: AROM flex & abd to 150 deg for hair care and cleaning (See above)  Long term goal 4: Able to put arms in coat sleeve without assist met    Plan:   [x] Continue per plan of care [] Alter current plan (see comments)  [] Plan of care initiated [] Hold pending MD visit [] Discharge    Plan for Next Session:  Continue to progress strength and stability. Monitor tolerance and advance as able.       Electronically signed by:  Fabiola Mendez PTA

## 2020-02-04 ENCOUNTER — HOSPITAL ENCOUNTER (OUTPATIENT)
Dept: PHYSICAL THERAPY | Age: 60
Setting detail: THERAPIES SERIES
Discharge: HOME | End: 2020-02-04
Payer: MEDICARE

## 2020-02-04 PROCEDURE — 97110 THERAPEUTIC EXERCISES: CPT

## 2020-02-07 ENCOUNTER — HOSPITAL ENCOUNTER (OUTPATIENT)
Dept: PHYSICAL THERAPY | Age: 60
Setting detail: THERAPIES SERIES
Discharge: HOME | End: 2020-02-07
Payer: MEDICARE

## 2020-02-07 PROCEDURE — 97110 THERAPEUTIC EXERCISES: CPT

## 2020-02-07 NOTE — FLOWSHEET NOTE
endurance, ROM. (73959)  [] Provided verbal/tactile cueing for activities related to improving balance, coordination, kinesthetic sense, posture, motor skill, proprioception. (90447)    Therapeutic Activities:     [] Therapeutic activities, direct (one-on-one) patient contact (use of dynamic activities to improve functional performance). (83991)    Gait:   [] Provided training and instruction to the patient for ambulation re-education. (97844)    Self-Care/ADL's  [] Self-care/home management training and compensatory training, meal preparation, safety procedures, and instructions in use of assistive technology devices/adaptive equipment, direct one-on-one contact. (47960)    Home Exercise Program:   Wand ex x4  [x] Reviewed/Progressed HEP activities related to strengthening, flexibility, endurance, ROM. (20986)  [] Reviewed/Progressed HEP activities related to improving balance, coordination, kinesthetic sense, posture, motor skill, proprioception.  (34324)    Manual Treatments:    [] Provided manual therapy to mobilize soft tissue/joints for the purpose of modulating pain, promoting relaxation,  increasing ROM, reducing/eliminating soft tissue swelling/inflammation/restriction, improving soft tissue extensibility. (32554)    Service Based Modalities:      Timed Code Treatment Minutes:    There ex 34'         Total Treatment Minutes:  29'    Treatment/Activity Tolerance:  [x] Patient tolerated treatment well [] Patient limited by fatique  [] Patient limited by pain  [] Patient limited by other medical complications  [] Other:     Prognosis: [x] Good [] Fair  [] Poor    Patient Requires Follow-up: [] Yes  [x] No    Goals:  Short term goals  Time Frame for Short term goals: 1 week   Short term goal 1: Start HEP met    Long term goals  Time Frame for Long term goals : 4 weeks   Long term goal 1: Pain controlled 2/10 during activist met  Long term goal 2: AROM behind the back to L3 for dressing and personal hygiene not met  Long term goal 3: AROM flex & abd to 150 deg for hair care and cleaning able to clean and hair care  Long term goal 4: Able to put arms in coat sleeve without assist met     Plan:   [] Continue per plan of care [] Alter current plan (see comments)  [] Plan of care initiated [] Hold pending MD visit [] Discharge    Plan for Next Session:  Will not schedule. To call back within 30 days if worsening.       Electronically signed by:  Bhavin Angeles PTA

## 2020-02-28 NOTE — DISCHARGE SUMMARY
Discharge Prognosis: [] Excellent [x] Good [] Fair  [] Poor     Goal Status:  [x] Achieved [] Partially Achieved  [] Not Achieved       Electronically signed by:  Nicolasa Carpio, PT

## 2020-07-20 ENCOUNTER — HOSPITAL ENCOUNTER (OUTPATIENT)
Age: 60
Setting detail: SPECIMEN
Discharge: HOME | End: 2020-07-20
Payer: MEDICARE

## 2020-07-20 LAB
ALBUMIN SERPL-MCNC: 4.1 G/DL (ref 3.5–5.2)
ALBUMIN/GLOBULIN RATIO: 1.4 (ref 1–2.5)
ALP BLD-CCNC: 121 U/L (ref 35–104)
ALT SERPL-CCNC: 52 U/L (ref 5–33)
ANION GAP SERPL CALCULATED.3IONS-SCNC: 21 MMOL/L (ref 9–17)
AST SERPL-CCNC: 43 U/L
BILIRUB SERPL-MCNC: 0.34 MG/DL (ref 0.3–1.2)
BUN BLDV-MCNC: 7 MG/DL (ref 6–20)
BUN/CREAT BLD: ABNORMAL (ref 9–20)
CALCIUM SERPL-MCNC: 9.1 MG/DL (ref 8.6–10.4)
CHLORIDE BLD-SCNC: 87 MMOL/L (ref 98–107)
CHOLESTEROL/HDL RATIO: 3.3
CHOLESTEROL: 104 MG/DL
CO2: 21 MMOL/L (ref 20–31)
CREAT SERPL-MCNC: 0.59 MG/DL (ref 0.5–0.9)
GFR AFRICAN AMERICAN: >60 ML/MIN
GFR NON-AFRICAN AMERICAN: >60 ML/MIN
GFR SERPL CREATININE-BSD FRML MDRD: ABNORMAL ML/MIN/{1.73_M2}
GFR SERPL CREATININE-BSD FRML MDRD: ABNORMAL ML/MIN/{1.73_M2}
GLUCOSE BLD-MCNC: 202 MG/DL (ref 70–99)
HCT VFR BLD CALC: 41.5 % (ref 36.3–47.1)
HDLC SERPL-MCNC: 32 MG/DL
HEMOGLOBIN: 13.9 G/DL (ref 11.9–15.1)
LDL CHOLESTEROL: 47 MG/DL (ref 0–130)
MCH RBC QN AUTO: 29.6 PG (ref 25.2–33.5)
MCHC RBC AUTO-ENTMCNC: 33.5 G/DL (ref 28.4–34.8)
MCV RBC AUTO: 88.5 FL (ref 82.6–102.9)
NRBC AUTOMATED: 0 PER 100 WBC
PDW BLD-RTO: 13.5 % (ref 11.8–14.4)
PLATELET # BLD: 323 K/UL (ref 138–453)
PMV BLD AUTO: 10.3 FL (ref 8.1–13.5)
POTASSIUM SERPL-SCNC: 4.3 MMOL/L (ref 3.7–5.3)
RBC # BLD: 4.69 M/UL (ref 3.95–5.11)
SODIUM BLD-SCNC: 129 MMOL/L (ref 135–144)
THYROXINE, FREE: 1.44 NG/DL (ref 0.93–1.7)
TOTAL PROTEIN: 7.1 G/DL (ref 6.4–8.3)
TRIGL SERPL-MCNC: 127 MG/DL
TSH SERPL DL<=0.05 MIU/L-ACNC: 2.35 MIU/L (ref 0.3–5)
VLDLC SERPL CALC-MCNC: ABNORMAL MG/DL (ref 1–30)
WBC # BLD: 12.5 K/UL (ref 3.5–11.3)

## 2020-07-23 ENCOUNTER — PROCEDURE VISIT (OUTPATIENT)
Dept: PODIATRY | Age: 60
End: 2020-07-23
Payer: MEDICARE

## 2020-07-23 VITALS
TEMPERATURE: 97.2 F | DIASTOLIC BLOOD PRESSURE: 76 MMHG | SYSTOLIC BLOOD PRESSURE: 130 MMHG | HEIGHT: 65 IN | WEIGHT: 236 LBS | HEART RATE: 76 BPM | BODY MASS INDEX: 39.32 KG/M2

## 2020-07-23 PROCEDURE — 99202 OFFICE O/P NEW SF 15 MIN: CPT | Performed by: PODIATRIST

## 2020-07-23 PROCEDURE — G8427 DOCREV CUR MEDS BY ELIG CLIN: HCPCS | Performed by: PODIATRIST

## 2020-07-23 PROCEDURE — 99214 OFFICE O/P EST MOD 30 MIN: CPT

## 2020-07-23 PROCEDURE — 1036F TOBACCO NON-USER: CPT | Performed by: PODIATRIST

## 2020-07-23 PROCEDURE — 3017F COLORECTAL CA SCREEN DOC REV: CPT | Performed by: PODIATRIST

## 2020-07-23 PROCEDURE — G8417 CALC BMI ABV UP PARAM F/U: HCPCS | Performed by: PODIATRIST

## 2020-07-23 RX ORDER — TRAZODONE HYDROCHLORIDE 50 MG/1
TABLET ORAL NIGHTLY
COMMUNITY
Start: 2020-06-30 | End: 2021-04-29

## 2020-07-23 NOTE — PROGRESS NOTES
Subjective:  Adonis Perkins is a 61 y.o. female who presents to the office today complaining of an ingrown nail. Symptoms began  week(s) ago. Patient relates pain is Present. Pain is rated 2 out of 10 and is described as mild. Treatments prior to today's visit include: none. Currently denies F/C/N/V. Allergies   Allergen Reactions    Bicillin [Penicillin G Benzathine] Shortness Of Breath    Ziprasidone Other (See Comments)     \"I can't sit still\"  Pt could not sit still, hyper         Past Medical History:   Diagnosis Date    Depression     Diabetes mellitus ([facility] [address]     Thyroid disease     Hypothyroid       Prior to Admission medications    Medication Sig Start Date End Date Taking?  Authorizing Provider   traZODone (DESYREL) 50 MG tablet nightly  6/30/20  Yes Historical Provider, MD   atorvastatin (LIPITOR) 20 MG tablet Take 20 mg by mouth daily  11/12/19  Yes Historical Provider, MD   glimepiride (AMARYL) 2 MG tablet Take 2 mg by mouth every morning (before breakfast)  11/8/19  Yes Historical Provider, MD   lisinopril (PRINIVIL;ZESTRIL) 2.5 MG tablet 2.5 mg daily  11/8/19  Yes Historical Provider, MD   levothyroxine (SYNTHROID) 25 MCG tablet 25 mcg Daily  11/8/19  Yes Historical Provider, MD   Insulin Glargine-Lixisenatide (SOLIQUA) 100-33 UNT-MCG/ML SOPN Inject 10 Units into the skin daily  Patient taking differently: Inject 40 Units into the skin daily  9/23/19  Yes Geraldine Spatz, MD   metoprolol tartrate (LOPRESSOR) 25 MG tablet Take 1 tablet by mouth 2 times daily 9/23/19  Yes Geraldine Spatz, MD   metFORMIN (GLUCOPHAGE) 1000 MG tablet Take 1 tablet by mouth 2 times daily (with meals) 9/23/19  Yes Geraldine Spatz, MD   melatonin 3 MG TABS tablet Take 5 mg by mouth nightly as needed   Yes Historical Provider, MD   lactulose (CHRONULAC) 10 GM/15ML solution Take 15 mLs by mouth 3 times daily  Patient not taking: Reported on 7/23/2020 8/29/19   Dominic Marques MD   diphenhydrAMINE (BENADRYL) 25 MG capsule Take 2 capsules by mouth every 6 hours as needed for Itching  Patient not taking: Reported on 7/23/2020 11/4/18   Piyush Chowdhury MD     ROS: All 14 ROS systems reviewed and pertinent positives noted above, all others negative. Objective:  Patient is alert and oriented. Vascular: DP and PT pulses palpable 2/4, bilateral.  CFT <3 seconds, bilateral.  Hair growth present to the level of the digits, bilateral.  Edema absent, bilateral.  Varicosities absent, bilateral. Erythema absent, bilateral. Distal Rubor absent bilateral.  Temperature within normal limits bilateral. Hyperpigmentation absent bilateral. No atrophic skin. Neuro: Protective sensation is intact. Reflexes WNL. Musculoskeletal:  Pain present upon palpation of left, medial, 2nd toe. Muscle strength 5/5, Bilateral. within normal limits medial longitudinal arch, Bilateral. ROM WNL. Integument: Onychocryptosis present left, medial, 2nd toe. Granuloma is absent 2nd toe. Paronychia changes with drainage and crust are absent 2nd toe. Skin color, texture, turgor normal. No rashes or lesions. .    Assessment:   Diagnosis Orders   1. Deformity of nail bed     2. Pain of toe of left foot     3. OC (onychocryptosis)         Plan:  Patient was educated on all treatment options. Patient examined and evaluated. Current condition and treatment options discussed in detail. Slant back nail cut took place of L 2nd lateral nail. No need for nail procedure currently, any increase in pain of signs of infection then patient will be seen back for a nail procedure. Patient will soak qd in warm soapy water or epsom salts and will use OTC antibiotic ointment daily.

## 2020-07-31 ENCOUNTER — TELEPHONE (OUTPATIENT)
Dept: SURGERY | Age: 60
End: 2020-07-31

## 2020-07-31 NOTE — TELEPHONE ENCOUNTER
7/31/2020 mailed Dr Adrian Denise colonoscopy paperwork , positive FIT test, no other symptoms , Ref Dharmesh Andrea,

## 2020-08-19 ENCOUNTER — TELEPHONE (OUTPATIENT)
Dept: SURGERY | Age: 60
End: 2020-08-19

## 2020-08-20 ENCOUNTER — OFFICE VISIT (OUTPATIENT)
Dept: PODIATRY | Age: 60
End: 2020-08-20
Payer: MEDICARE

## 2020-08-20 VITALS
HEIGHT: 65 IN | HEART RATE: 82 BPM | DIASTOLIC BLOOD PRESSURE: 74 MMHG | WEIGHT: 203 LBS | SYSTOLIC BLOOD PRESSURE: 132 MMHG | TEMPERATURE: 96.5 F | BODY MASS INDEX: 33.82 KG/M2

## 2020-08-20 PROCEDURE — 3017F COLORECTAL CA SCREEN DOC REV: CPT | Performed by: PODIATRIST

## 2020-08-20 PROCEDURE — G8417 CALC BMI ABV UP PARAM F/U: HCPCS | Performed by: PODIATRIST

## 2020-08-20 PROCEDURE — 3052F HG A1C>EQUAL 8.0%<EQUAL 9.0%: CPT | Performed by: PODIATRIST

## 2020-08-20 PROCEDURE — 99213 OFFICE O/P EST LOW 20 MIN: CPT | Performed by: PODIATRIST

## 2020-08-20 PROCEDURE — G8427 DOCREV CUR MEDS BY ELIG CLIN: HCPCS | Performed by: PODIATRIST

## 2020-08-20 PROCEDURE — 1036F TOBACCO NON-USER: CPT | Performed by: PODIATRIST

## 2020-08-20 PROCEDURE — 2022F DILAT RTA XM EVC RTNOPTHY: CPT | Performed by: PODIATRIST

## 2020-08-20 PROCEDURE — 99214 OFFICE O/P EST MOD 30 MIN: CPT

## 2020-08-20 NOTE — PROGRESS NOTES
Subjective:  Sherren Quam is a 61 y.o. female who presents to the office today complaining of stubbing right little toe 1 week ago. Symptoms began 1 week(s) ago. Pt saw bleeding so she was worried. Patient relates pain is Absent . Pain is rated 0 out of 10 and is described as none. Treatments prior to today's visit include: none. Currently denies F/C/N/V. Allergies   Allergen Reactions    Bicillin [Penicillin G Benzathine] Shortness Of Breath    Ziprasidone Other (See Comments)     \"I can't sit still\"  Pt could not sit still, hyper         Past Medical History:   Diagnosis Date    Depression     Diabetes mellitus ([facility] [address]     Thyroid disease     Hypothyroid       Prior to Admission medications    Medication Sig Start Date End Date Taking?  Authorizing Provider   traZODone (DESYREL) 50 MG tablet nightly  6/30/20  Yes Historical Provider, MD   atorvastatin (LIPITOR) 20 MG tablet Take 20 mg by mouth daily  11/12/19  Yes Historical Provider, MD   glimepiride (AMARYL) 2 MG tablet Take 2 mg by mouth every morning (before breakfast)  11/8/19  Yes Historical Provider, MD   lisinopril (PRINIVIL;ZESTRIL) 2.5 MG tablet 2.5 mg daily  11/8/19  Yes Historical Provider, MD   levothyroxine (SYNTHROID) 25 MCG tablet 25 mcg Daily  11/8/19  Yes Historical Provider, MD   Insulin Glargine-Lixisenatide (SOLIQUA) 100-33 UNT-MCG/ML SOPN Inject 10 Units into the skin daily  Patient taking differently: Inject 40 Units into the skin daily  9/23/19  Yes Victor Manuel Duarte MD   metoprolol tartrate (LOPRESSOR) 25 MG tablet Take 1 tablet by mouth 2 times daily 9/23/19  Yes Victor Manuel Duarte MD   metFORMIN (GLUCOPHAGE) 1000 MG tablet Take 1 tablet by mouth 2 times daily (with meals) 9/23/19  Yes Victor Manuel Duarte MD   lactulose (CHRONULAC) 10 GM/15ML solution Take 15 mLs by mouth 3 times daily 8/29/19  Yes Sedrick Diane MD   melatonin 3 MG TABS tablet Take 5 mg by mouth nightly as needed   Yes Historical Provider, MD   diphenhydrAMINE (BENADRYL) 25 MG capsule Take 2 capsules by mouth every 6 hours as needed for Itching 18  Yes Anai Warren MD       Past Surgical History:   Procedure Laterality Date    ABDOMEN SURGERY      CHOLECYSTECTOMY      COLONOSCOPY  2019    COLONOSCOPY FLEXIBLE W/ DECOMPRESSION performed by Manley Apley, MD at [facility]    COLONOSCOPY N/A 2019    COLONOSCOPY FLEXIBLE W/ DECOMPRESSION performed by Franchesca Westbrook MD at [address] [place] [address]         Family History   Problem Relation Age of Onset    Diabetes Mother     Heart Disease Mother     Stroke Brother        Social History     Tobacco Use    Smoking status: Former Smoker     Packs/day: 1.00     Years: 40.00     Pack years: 40.00     Types: Cigarettes     Last attempt to quit: 3/6/2019     Years since quittin.4    Smokeless tobacco: Never Used    Tobacco comment: Maritza Bentleyguero RRT 19   Substance Use Topics    Alcohol use: No       ROS: All 14 ROS systems reviewed and pertinent positives noted above, all others negative. Lower Extremity Physical Examination:     Vitals:   Vitals:    20 0841   BP: 132/74   Pulse: 82   Temp: 96.5 °F (35.8 °C)     General: AAO x 3 in NAD. Vascular: DP and PT pulses palpable 2/4, bilateral.  CFT <3 seconds, bilateral.  Hair growth present to the level of the digits, bilateral.  Edema absent, bilateral.  Varicosities absent, bilateral. Erythema absent, bilateral. Distal Rubor absent bilateral.  Temperature within normal limits bilateral. Hyperpigmentation absent bilateral. No atrophic skin. Neurological: Sensation intact to light touch to level of digits, bilateral.  Protective sensation intact 10/10 sites via 5.07/10g [place] Monofilament, bilateral.  negative Tinel's, bilateral.  negative Valleix sign, bilateral.  Vibratory intact bilateral.  Reflexes Decreased bilateral.  Paresthesias negative. Dysthesias negative.   Sharp/dull intact bilateral.    Musculoskeletal: Muscle strength 5/5, bilateral.  Pain absent upon palpation bilateral. Normal medial longitudinal arch, bilateral.  Ankle ROM within normal limits,bilateral.  1st MPJ ROM within normal limits, bilateral.  Dorsally contracted digits absent. No other foot deformities. Integument: Warm, dry, supple, bilateral.  Partial traumatic nail avulsion right fifth toe distal aspect. No signs of paronychia. Dried crust is seen. Only distal quarter of the nail was lifted. Open lesion absent, bilateral.  Interdigital maceration absent to web spaces bilateral.  Nails within normal limits. Fissures absent, bilateral. Hyperkeratotic tissue is absent. Asessment: Patient is a 61 y.o. female with:    Diagnosis Orders   1. Traumatic avulsion of nail plate of toe, initial encounter     2. Controlled type 2 diabetes mellitus without complication, with long-term current use of insulin ([address]         Plan: Patient examined and evaluated. Current condition and treatment options discussed in detail. Dressing: abx ointment qd for 1 week  DM foot ed and exam  Right fifth nail debrided approximately. No need for nail procedure at this time. Any signs infection or issues patient should be seen back in office immediately for nail procedure  Contact office with any questions/problems/concerns. RTC in 2week(s).

## 2020-08-24 ENCOUNTER — OFFICE VISIT (OUTPATIENT)
Dept: PRIMARY CARE CLINIC | Age: 60
End: 2020-08-24
Payer: MEDICARE

## 2020-08-24 ENCOUNTER — HOSPITAL ENCOUNTER (OUTPATIENT)
Age: 60
Setting detail: SPECIMEN
Discharge: HOME | End: 2020-08-24
Payer: MEDICARE

## 2020-08-24 VITALS
BODY MASS INDEX: 33.28 KG/M2 | SYSTOLIC BLOOD PRESSURE: 110 MMHG | TEMPERATURE: 97.7 F | DIASTOLIC BLOOD PRESSURE: 70 MMHG | WEIGHT: 200 LBS | OXYGEN SATURATION: 98 % | HEART RATE: 72 BPM

## 2020-08-24 LAB — S PYO AG THROAT QL: NORMAL

## 2020-08-24 PROCEDURE — 87880 STREP A ASSAY W/OPTIC: CPT | Performed by: PHYSICIAN ASSISTANT

## 2020-08-24 PROCEDURE — 87651 STREP A DNA AMP PROBE: CPT

## 2020-08-24 PROCEDURE — G8427 DOCREV CUR MEDS BY ELIG CLIN: HCPCS | Performed by: PHYSICIAN ASSISTANT

## 2020-08-24 PROCEDURE — G8417 CALC BMI ABV UP PARAM F/U: HCPCS | Performed by: PHYSICIAN ASSISTANT

## 2020-08-24 PROCEDURE — 1036F TOBACCO NON-USER: CPT | Performed by: PHYSICIAN ASSISTANT

## 2020-08-24 PROCEDURE — 3017F COLORECTAL CA SCREEN DOC REV: CPT | Performed by: PHYSICIAN ASSISTANT

## 2020-08-24 PROCEDURE — 99213 OFFICE O/P EST LOW 20 MIN: CPT | Performed by: PHYSICIAN ASSISTANT

## 2020-08-24 PROCEDURE — 99212 OFFICE O/P EST SF 10 MIN: CPT

## 2020-08-24 ASSESSMENT — ENCOUNTER SYMPTOMS
WHEEZING: 0
SHORTNESS OF BREATH: 0
VOMITING: 0
NAUSEA: 0
SORE THROAT: 1
DIARRHEA: 0
TROUBLE SWALLOWING: 1
COUGH: 0

## 2020-08-24 NOTE — PROGRESS NOTES
[facility]    Subjective:      Patient ID: Toniann Cushing is a 61 y.o. y.o. female. Patient is seen due to a miserable sore throat for 4 days. No fever chills. It hurts to swallow. No cold sx otherwise. No cough. Using Halls and lozenges. Taking tylenol not helping. No one else is ill.           Past Medical History:   Diagnosis Date    Depression     Diabetes mellitus ([address]     Thyroid disease     Hypothyroid       Past Surgical History:   Procedure Laterality Date    ABDOMEN SURGERY      CHOLECYSTECTOMY      COLONOSCOPY  8/12/2019    COLONOSCOPY FLEXIBLE W/ DECOMPRESSION performed [facility] Cecil Barron MD at [facility]    COLONOSCOPY N/A 8/16/2019    COLONOSCOPY FLEXIBLE W/ DECOMPRESSION performed by Steven Gore MD at [address] [place] [address]    UMBILICAL HERNIA REPAIR         Family History   Problem Relation Age of Onset    Diabetes Mother     Heart Disease Mother     Stroke Brother        Allergies   Allergen Reactions    Bicillin [Penicillin G Benzathine] Shortness Of Breath    Ziprasidone Other (See Comments)     \"I can't sit still\"  Pt could not sit still, hyper         Current Outpatient Medications   Medication Sig Dispense Refill    traZODone (DESYREL) 50 MG tablet nightly       atorvastatin (LIPITOR) 20 MG tablet Take 20 mg by mouth daily       glimepiride (AMARYL) 2 MG tablet Take 2 mg by mouth every morning (before breakfast)       lisinopril (PRINIVIL;ZESTRIL) 2.5 MG tablet 2.5 mg daily       levothyroxine (SYNTHROID) 25 MCG tablet 25 mcg Daily       Insulin Glargine-Lixisenatide (SOLIQUA) 100-33 UNT-MCG/ML SOPN Inject 10 Units into the skin daily (Patient taking differently: Inject 40 Units into the skin daily ) 1 pen 3    metoprolol tartrate (LOPRESSOR) 25 MG tablet Take 1 tablet by mouth 2 times daily 60 tablet 3    metFORMIN (GLUCOPHAGE) 1000 MG tablet Take 1 tablet by mouth 2 times daily (with meals) 60 tablet 0    lactulose

## 2020-08-25 LAB
DIRECT EXAM: NORMAL
Lab: NORMAL
SPECIMEN DESCRIPTION: NORMAL

## 2020-08-25 NOTE — TELEPHONE ENCOUNTER
Phoned patient and she states that she has not had a colonoscopy in the past. Patient is also having several GI symptoms. Patient scheduled for consult.

## 2020-09-01 ENCOUNTER — HOSPITAL ENCOUNTER (OUTPATIENT)
Dept: LAB | Age: 60
Discharge: HOME | End: 2020-09-01
Payer: MEDICARE

## 2020-09-01 LAB
ANION GAP SERPL CALCULATED.3IONS-SCNC: 19 MMOL/L (ref 9–17)
BUN BLDV-MCNC: 9 MG/DL (ref 6–20)
BUN/CREAT BLD: 11 (ref 9–20)
CALCIUM SERPL-MCNC: 9.9 MG/DL (ref 8.6–10.4)
CHLORIDE BLD-SCNC: 93 MMOL/L (ref 98–107)
CO2: 21 MMOL/L (ref 20–31)
CREAT SERPL-MCNC: 0.83 MG/DL (ref 0.5–0.9)
GFR AFRICAN AMERICAN: >60 ML/MIN
GFR NON-AFRICAN AMERICAN: >60 ML/MIN
GFR SERPL CREATININE-BSD FRML MDRD: ABNORMAL ML/MIN/{1.73_M2}
GFR SERPL CREATININE-BSD FRML MDRD: ABNORMAL ML/MIN/{1.73_M2}
GLUCOSE BLD-MCNC: 178 MG/DL (ref 70–99)
POTASSIUM SERPL-SCNC: 4.4 MMOL/L (ref 3.7–5.3)
SODIUM BLD-SCNC: 133 MMOL/L (ref 135–144)

## 2020-09-01 PROCEDURE — 36415 COLL VENOUS BLD VENIPUNCTURE: CPT

## 2020-09-01 PROCEDURE — 80048 BASIC METABOLIC PNL TOTAL CA: CPT

## 2020-09-08 ENCOUNTER — INITIAL CONSULT (OUTPATIENT)
Dept: SURGERY | Age: 60
End: 2020-09-08
Payer: MEDICARE

## 2020-09-08 VITALS
BODY MASS INDEX: 33.32 KG/M2 | SYSTOLIC BLOOD PRESSURE: 130 MMHG | RESPIRATION RATE: 18 BRPM | DIASTOLIC BLOOD PRESSURE: 80 MMHG | WEIGHT: 200 LBS | TEMPERATURE: 97.5 F | HEIGHT: 65 IN | OXYGEN SATURATION: 96 % | HEART RATE: 79 BPM

## 2020-09-08 PROCEDURE — G8427 DOCREV CUR MEDS BY ELIG CLIN: HCPCS | Performed by: SURGERY

## 2020-09-08 PROCEDURE — 99212 OFFICE O/P EST SF 10 MIN: CPT | Performed by: SURGERY

## 2020-09-08 PROCEDURE — G8417 CALC BMI ABV UP PARAM F/U: HCPCS | Performed by: SURGERY

## 2020-09-08 PROCEDURE — 3017F COLORECTAL CA SCREEN DOC REV: CPT | Performed by: SURGERY

## 2020-09-08 PROCEDURE — 99215 OFFICE O/P EST HI 40 MIN: CPT

## 2020-09-08 PROCEDURE — 1036F TOBACCO NON-USER: CPT | Performed by: SURGERY

## 2020-09-08 NOTE — PATIENT INSTRUCTIONS
fluid after the test to replace the fluids you may have lost during the colon prep. But don't drink alcohol. Your doctor will talk to you about when you'll need your next colonoscopy. The results of your test and your risk for colorectal cancer will help your doctor decide how often you need to be checked. After the test, you may be bloated or have gas pains. You may need to pass gas. If a biopsy was done or a polyp was removed, you may have streaks of blood in your stool (feces) for a few days. If polyps were taken out, your doctor may tell you to avoid taking aspirin and nonsteroidal anti-inflammatory drugs (NSAIDs) for 7 to 14 days. Problems such as heavy rectal bleeding may not occur until several weeks after the test. This isn't common. But it can happen after polyps are removed. Follow-up care is a key part of your treatment and safety. Be sure to make and go to all appointments, and call your doctor if you are having problems. It's also a good idea to know your test results and keep a list of the medicines you take. Where can you learn more? Go to https://RedKLEVER.[employer]. org and sign in to your Viewpoint account. Enter F753 in the Sirigen box to learn more about \"Learning About Colonoscopy. \"     If you do not have an account, please click on the \"Sign Up Now\" link. Current as of: August 22, 2019               Content Version: 12.5  © 8322-3180 Healthwise, Incorporated. Care instructions adapted under license by [facility] ([facility]). If you have questions about a medical condition or this instruction, always ask your healthcare professional. [address] any warranty or liability for your use of this information.

## 2020-09-08 NOTE — PROGRESS NOTES
Name:  Jennifer Crook  Age:  61 y.o.   :  1960    Physician: Evan Kent MD       Chief Complaint: Positive FIT test.      HPI:  Patient has not symptoms currently. No gross bleeding, change in bowel habit, abdominal pain etc.  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 She does not recall these, but was hospitalized with encephalopathy at that time. MEDICAL HISTORY:    Past Medical History:        Diagnosis Date    Depression     Diabetes mellitus ([address]     Thyroid disease     Hypothyroid       Past Surgical History:        Procedure Laterality Date    ABDOMEN SURGERY      CHOLECYSTECTOMY      COLONOSCOPY  2019    COLONOSCOPY FLEXIBLE W/ DECOMPRESSION performed by Heath De La Cruz MD at [facility]    COLONOSCOPY N/A 2019    COLONOSCOPY FLEXIBLE W/ DECOMPRESSION performed by Corey Early MD at [address] [place] [address]         Prior to Admission medications    Medication Sig Start Date End Date Taking?  Authorizing Provider   traZODone (DESYREL) 50 MG tablet nightly  20   Historical Provider, MD   atorvastatin (LIPITOR) 20 MG tablet Take 20 mg by mouth daily  19   Historical Provider, MD   glimepiride (AMARYL) 2 MG tablet Take 2 mg by mouth every morning (before breakfast)  19   Historical Provider, MD   lisinopril (PRINIVIL;ZESTRIL) 2.5 MG tablet 2.5 mg daily  19   Historical Provider, MD   levothyroxine (SYNTHROID) 25 MCG tablet 25 mcg Daily  19   Historical Provider, MD   Insulin Glargine-Lixisenatide (SOLIQUA) 100-33 UNT-MCG/ML SOPN Inject 10 Units into the skin daily  Patient taking differently: Inject 40 Units into the skin daily  19   Kathe Morales MD   metoprolol tartrate (LOPRESSOR) 25 MG tablet Take 1 tablet by mouth 2 times daily 19   Kathe Morales MD   metFORMIN (GLUCOPHAGE) 1000 MG tablet Take 1 tablet by mouth 2 times daily (with meals) 19 Lis Blanton MD   lactulose [facility]) 10 GM/15ML solution Take 15 mLs by mouth 3 times daily 8/29/19   Gordon Mtz MD   melatonin 3 MG TABS tablet Take 5 mg by mouth nightly as needed    Historical Provider, MD   diphenhydrAMINE (BENADRYL) 25 MG capsule Take 2 capsules by mouth every 6 hours as needed for Itching 11/4/18   Piyush Chowdhury MD       Allergies   Allergen Reactions    Bicillin [Penicillin G Benzathine] Shortness Of Breath    Ziprasidone Other (See Comments)     \"I can't sit still\"  Pt could not sit still, hyper          reports that she quit smoking about 18 months ago. Her smoking use included cigarettes. She has a 40.00 pack-year smoking history. She has never used smokeless tobacco.  reports no history of alcohol use. Family History   Problem Relation Age of Onset    Diabetes Mother     Heart Disease Mother     Stroke Brother             REVIEW OF SYSTEMS:  General:  negative  Eye:  negative   ENT:negative  Allergy/Immunology:  negative  Hematology/Lymphatic: negative  Lungs: negative  Cardiovascular: negative  Gastrointestinal: negative  : negative  Neurological: negative      PHYSICAL EXAM:    /80 (Site: Right Upper Arm, Position: Sitting, Cuff Size: Medium Adult)   Pulse 79   Temp 97.5 °F (36.4 °C) (Tympanic)   Resp 18   Ht 5' 5\" (1.651 m)   Wt 200 lb (90.7 kg)   SpO2 96%   BMI 33.28 kg/m²       Gen: Alert and oriented x3, no acute distress, well-appearing    Eyes: PERRL, Sclera Anicteric    Head: Normocephalic, non tender     Neck: Supple, no significant adenopathy. No carotid bruits, thyroid normal size and no masses    Chest: CTA, no wheezes, no rales, no rhonchi, symmetrical    Heart: Normal rate, regular rhythm, no murmurs    Abdomen: Soft, positive bowel sounds, non tender, non distended, no masses, no hernias, no HSM, no bruits. RUQ scar and periumbilical scar.     Neuro: Normal speech, motor/sensory grossly normal bilateral    MSK: No joint tenderness, deformity, or swelling           ASSESSMENT:  1) Positive FIT test - her previous colonoscopy were therapeutic not diagnostic or screening, and not done under ideal circumstances. Therefore it is reasonable to do a diagnostic colonoscopy. PLAN:  1) Colonoscopy - Risks and benefits of colonoscopy were discussed with Hyacinth. #5 Ellie Baumann. In particular I discussed the possibility of incomplete colonoscopy and failure to make a diagnosis. I also discussed the risks and consequences of reactions to the sedatives, bleeding and perforation. Alternate ways of evaluating the colon including barium enema, CT colonography and sigmoidoscopy were discussed. she was also given the opportunity to have any questions answered, and encouraged to call the office with additional issues.        Electronically signed by Salina Zhao MD on 9/8/2020 at 10:12 AM

## 2020-09-08 NOTE — LETTER
[facility]         Patient:Zoila Rick           :1960           Surgical/Procedure Planned: Colonoscopy    Date & Location: 2020       Outpatient   Planned Length of OR: 45 minutes    Sedation: intravenous sedation    This is notification of the scheduled procedure only: no    Estimated Cardiac Risk for Non-Cardiac Surgery/Procedure     Low______ Moderate______ High______    Medication Instructions - Clarification needed by this date:   -Other medications:  Amaryl:  Hold_____ Days  Metformin: Hold _____Days      Resume medications:     Lovenox indicated: _____Yes _____NO    Provider:Dr. Nichol Hernandez of Provider Giving Orders for Medication holds:    _____________________________________________

## 2020-09-08 NOTE — LETTER
AMBULATORY SURGERY INSTRUCTIONS    - If you should develop a cold, sore throat, cough, fever or other new indication of illness prior to the scheduled surgery, please notify the [address] [place] [address] office as early as possible. - DO NOT EAT OR DRINK ANYTHING AFTER MIDNIGHT THE NIGHT BEFORE YOUR SURGERY. This includes water, tea, juice, cereal, coffee, etc.    - Refrain from smoking the day of your surgery or procedure. - Wear simple loose clothing, which can be easily changed. - Do not wear any make-up, lipstick or nail polish. - Please leave contact lenses at home or bring container for them. - Leave jewelry (including rings) and other valuables at home. - Make arrangements for a family member or friend to drive you to and from the surgery center. The anesthetic may affect your judgement following surgery and driving a vehicle within 24 hours after the anesthesia could be dangerous. Please remember that a responsible adult must remain in the building at all times during your surgery. - Children should be accompanied by two adults; one to watch the child, the other to drive the vehicle home. - Please shower or bathe both on the evening prior to surgery and on the morning of surgery using an antibacterial soap/Hibiclens    - You may be asked to sign several forms prior to surgery; patients under age 25 have a parent or legal guardian sign the permit to operate.    - DO NOT take aspirin, Aleve, Motrin, Ibuprofen, Naproxen, Vitamins or Herbal supplements for 1 week or 5 days prior to the day of surgery.    -The morning of your procedure please take blood pressure, heart, and seizure medications with a small sip of water. Day of procedure report to the [address] on [address] 1st floor in the hospital, after check in you will then go to the second floor.          PROCEDURE DATE:    Estimated surgery arrival time: The surgery center will call you the day

## 2020-09-14 ENCOUNTER — PRE-PROCEDURE TELEPHONE (OUTPATIENT)
Dept: PREADMISSION TESTING | Age: 60
End: 2020-09-14

## 2020-09-14 NOTE — TELEPHONE ENCOUNTER
Patient called in to schedule Covid swab for Sept 17 at 1100. I instructed patient I would return a call to her later today and make sure we had her times correct.

## 2020-09-14 NOTE — TELEPHONE ENCOUNTER
Returned call to patient instructing her that the Covid swab will need to be done on Sept 16 to assure that the results come back prior to her colonoscopy scheduled on Sept 21. States she only has a ride on Sept 17, patient states  the appointment may have to be canceled because her insurance wont pay for the prep. Patient says the office will call her back and discuss further plans when they get an answer regarding the prep.

## 2020-09-15 ENCOUNTER — TELEPHONE (OUTPATIENT)
Dept: SURGERY | Age: 60
End: 2020-09-15

## 2020-09-15 NOTE — TELEPHONE ENCOUNTER
Received call from Joanne Loya, wanting to rescheduled Colonoscopy that was scheduled on September 20th, 2020 until end of October. Patient rescheduled to October 19th, 2020 with Dr. Tyson Phoenix.

## 2020-10-01 ENCOUNTER — OFFICE VISIT (OUTPATIENT)
Dept: PODIATRY | Age: 60
End: 2020-10-01
Payer: MEDICARE

## 2020-10-01 VITALS
DIASTOLIC BLOOD PRESSURE: 64 MMHG | WEIGHT: 197 LBS | SYSTOLIC BLOOD PRESSURE: 128 MMHG | BODY MASS INDEX: 32.82 KG/M2 | HEIGHT: 65 IN | HEART RATE: 92 BPM

## 2020-10-01 PROCEDURE — 11720 DEBRIDE NAIL 1-5: CPT | Performed by: PODIATRIST

## 2020-10-01 PROCEDURE — 99999 PR OFFICE/OUTPT VISIT,PROCEDURE ONLY: CPT | Performed by: PODIATRIST

## 2020-10-01 NOTE — PROGRESS NOTES
Subjective:  Patient presents to [facility] today for routine foot care. Patient denies any new problems with their feet. Allergies   Allergen Reactions    Bicillin [Penicillin G Benzathine] Shortness Of Breath    Ziprasidone Other (See Comments)     \"I can't sit still\"  Pt could not sit still, hyper         Past Medical History:   Diagnosis Date    Depression     Diabetes mellitus ([address]     Thyroid disease     Hypothyroid       Prior to Admission medications    Medication Sig Start Date End Date Taking?  Authorizing Provider   traZODone (DESYREL) 50 MG tablet nightly  6/30/20  Yes Historical Provider, MD   atorvastatin (LIPITOR) 20 MG tablet Take 20 mg by mouth daily  11/12/19  Yes Historical Provider, MD   glimepiride (AMARYL) 2 MG tablet Take 2 mg by mouth every morning (before breakfast)  11/8/19  Yes Historical Provider, MD   lisinopril (PRINIVIL;ZESTRIL) 2.5 MG tablet 2.5 mg daily  11/8/19  Yes Historical Provider, MD   levothyroxine (SYNTHROID) 25 MCG tablet 25 mcg Daily  11/8/19  Yes Historical Provider, MD   Insulin Glargine-Lixisenatide (SOLIQUA) 100-33 UNT-MCG/ML SOPN Inject 10 Units into the skin daily  Patient taking differently: Inject 40 Units into the skin daily  9/23/19  Yes Chely Leonardo MD   metoprolol tartrate (LOPRESSOR) 25 MG tablet Take 1 tablet by mouth 2 times daily 9/23/19  Yes Chely Leonardo MD   metFORMIN (GLUCOPHAGE) 1000 MG tablet Take 1 tablet by mouth 2 times daily (with meals) 9/23/19  Yes Chely Leonardo MD   lactulose (CHRONULAC) 10 GM/15ML solution Take 15 mLs by mouth 3 times daily 8/29/19  Yes Ravi Chase MD   melatonin 3 MG TABS tablet Take 5 mg by mouth nightly as needed   Yes Historical Provider, MD   diphenhydrAMINE (BENADRYL) 25 MG capsule Take 2 capsules by mouth every 6 hours as needed for Itching 11/4/18  Yes Reynaldo Lewis MD       Social History     Tobacco Use    Smoking status: Former Smoker     Packs/day: 1.00     Years: 40.00     Pack years: 40. 00     Types: Cigarettes     Last attempt to quit: 3/6/2019     Years since quittin.5    Smokeless tobacco: Never Used    Tobacco comment: Teresa Client RRT 19   Substance Use Topics    Alcohol use: No     ROS: All 14 ROS systems reviewed and pertinent positives noted above, all others negative. Objective:  Vascular: DP and PT pulses palpable 2/4, bilateral.  CFT <3 seconds, bilateral.  Hair growth present to the level of the digits, bilateral.  Edema absent, bilateral.  Varicosities absent, bilateral. Erythema absent, bilateral. Distal Rubor absent bilateral.  Temperature within normal limits bilateral. Hyperpigmentation absent bilateral. No atrophic skin. Neurological: Sensation intact to light touch to level of digits, bilateral.  Protective sensation intact 10/10 sites via 5.07/10g [place] Monofilament, bilateral.  negative Tinel's, bilateral.  negative Valleix sign, bilateral.  Vibratory intact bilateral.  Reflexes Decreased bilateral.  Paresthesias negative. Dysthesias negative. Sharp/dull intact bilateral.    Integument:  Nails left 2 and right none thickened, dystrophic and crumbly, discolored with subungual debris. Hyperkeratotic tissue is absent. Assessment:   Diagnosis Orders   1. Controlled type 2 diabetes mellitus without complication, with long-term current use of insulin ([address]     2. Dermatophytosis of nail         Plan:  Nails as mentioned above debrided in length and thickness. Patient advised OTC methods for treatment of the mycotic nails. Patient will follow up in 10 weeks.

## 2020-10-01 NOTE — PROGRESS NOTES
Foot Care Worksheet  PCP: DESIREE Love  Last visit: 7/7/2020 per patient      Nail description:  Thick , Yellow , Crumbly , Marked limitation of ambulation     Pain resulting from thickened and dystrophy of nail plate No    Nails involved  Right   none  (T5-T9)  Left     2  (TA-T4)    Q7 1 Class A Finding - Non traumatic amputation of foot No    Q8 2 Class B Findings - Absent DP pulse No, Absent PT pulse No, Advanced tropic changes (3 required) Yes    Decrease hair growth Yes, Nail changes/thickening Yes, Pigmented changes/discoloration Yes, Skin texture (thin, shiny) No, Skin color (rubor/redness) No    Q9 1 Class B and 2 Class C Findings  Claudication No, Temperature change Yes, Paresthesia No, Burning No, Edema Yes

## 2020-10-05 ENCOUNTER — TELEPHONE (OUTPATIENT)
Dept: PREADMISSION TESTING | Age: 60
End: 2020-10-05

## 2020-10-06 ENCOUNTER — TELEPHONE (OUTPATIENT)
Dept: PREADMISSION TESTING | Age: 60
End: 2020-10-06

## 2020-10-08 ENCOUNTER — TELEPHONE (OUTPATIENT)
Dept: PREADMISSION TESTING | Age: 60
End: 2020-10-08

## 2020-10-09 ENCOUNTER — TELEPHONE (OUTPATIENT)
Dept: SURGERY | Age: 60
End: 2020-10-09

## 2020-10-09 NOTE — TELEPHONE ENCOUNTER
Left message for patient to hold Metformin one day prior colonoscopy that is scheduled for 10-19-20. Patient is to call office to verify message.

## 2020-10-13 RX ORDER — POLYETHYLENE GLYCOL 3350, SODIUM CHLORIDE, SODIUM BICARBONATE, POTASSIUM CHLORIDE 420; 11.2; 5.72; 1.48 G/4L; G/4L; G/4L; G/4L
POWDER, FOR SOLUTION ORAL
Qty: 4000 ML | Refills: 0 | Status: ON HOLD
Start: 2020-10-13 | End: 2020-10-19 | Stop reason: HOSPADM

## 2020-10-13 RX ORDER — BISACODYL 5 MG
TABLET, DELAYED RELEASE (ENTERIC COATED) ORAL
Qty: 2 TABLET | Refills: 0 | Status: ON HOLD
Start: 2020-10-13 | End: 2020-10-19 | Stop reason: HOSPADM

## 2020-10-13 NOTE — TELEPHONE ENCOUNTER
Received call from patient's  stating patient does not have the bowel prep at her pharmacy as nothing has been called in. I explained I will send the bowel prep to her preferred pharmacy. Would like bowel prep for upcoming colonoscopy to be sent to [facility].

## 2020-10-14 ENCOUNTER — HOSPITAL ENCOUNTER (OUTPATIENT)
Dept: PREADMISSION TESTING | Age: 60
Setting detail: SPECIMEN
Discharge: HOME | End: 2020-10-18
Payer: MEDICARE

## 2020-10-14 PROCEDURE — U0003 INFECTIOUS AGENT DETECTION BY NUCLEIC ACID (DNA OR RNA); SEVERE ACUTE RESPIRATORY SYNDROME CORONAVIRUS 2 (SARS-COV-2) (CORONAVIRUS DISEASE [COVID-19]), AMPLIFIED PROBE TECHNIQUE, MAKING USE OF HIGH THROUGHPUT TECHNOLOGIES AS DESCRIBED BY CMS-2020-01-R: HCPCS

## 2020-10-16 LAB — SARS-COV-2, NAA: NOT DETECTED

## 2020-10-17 ENCOUNTER — TELEPHONE (OUTPATIENT)
Dept: PRIMARY CARE CLINIC | Age: 60
End: 2020-10-17

## 2020-10-19 ENCOUNTER — ANESTHESIA (OUTPATIENT)
Dept: OPERATING ROOM | Age: 60
End: 2020-10-19
Payer: MEDICARE

## 2020-10-19 ENCOUNTER — HOSPITAL ENCOUNTER (OUTPATIENT)
Age: 60
Setting detail: OUTPATIENT SURGERY
Discharge: HOME | End: 2020-10-19
Attending: SURGERY | Admitting: SURGERY
Payer: MEDICARE

## 2020-10-19 ENCOUNTER — ANESTHESIA EVENT (OUTPATIENT)
Dept: OPERATING ROOM | Age: 60
End: 2020-10-19
Payer: MEDICARE

## 2020-10-19 VITALS
HEART RATE: 80 BPM | OXYGEN SATURATION: 95 % | DIASTOLIC BLOOD PRESSURE: 57 MMHG | RESPIRATION RATE: 18 BRPM | SYSTOLIC BLOOD PRESSURE: 102 MMHG | TEMPERATURE: 97.1 F | WEIGHT: 190 LBS | HEIGHT: 65 IN | BODY MASS INDEX: 31.65 KG/M2

## 2020-10-19 VITALS — DIASTOLIC BLOOD PRESSURE: 60 MMHG | SYSTOLIC BLOOD PRESSURE: 103 MMHG | OXYGEN SATURATION: 89 %

## 2020-10-19 LAB — GLUCOSE BLD-MCNC: 137 MG/DL (ref 65–105)

## 2020-10-19 PROCEDURE — 45381 COLONOSCOPY SUBMUCOUS NJX: CPT | Performed by: SURGERY

## 2020-10-19 PROCEDURE — 45385 COLONOSCOPY W/LESION REMOVAL: CPT | Performed by: SURGERY

## 2020-10-19 PROCEDURE — 2500000003 HC RX 250 WO HCPCS: Performed by: NURSE ANESTHETIST, CERTIFIED REGISTERED

## 2020-10-19 PROCEDURE — 7100000011 HC PHASE II RECOVERY - ADDTL 15 MIN: Performed by: SURGERY

## 2020-10-19 PROCEDURE — 7100000010 HC PHASE II RECOVERY - FIRST 15 MIN: Performed by: SURGERY

## 2020-10-19 PROCEDURE — 2709999900 HC NON-CHARGEABLE SUPPLY: Performed by: SURGERY

## 2020-10-19 PROCEDURE — 3700000000 HC ANESTHESIA ATTENDED CARE: Performed by: SURGERY

## 2020-10-19 PROCEDURE — 2580000003 HC RX 258: Performed by: NURSE ANESTHETIST, CERTIFIED REGISTERED

## 2020-10-19 PROCEDURE — 3609010600 HC COLONOSCOPY POLYPECTOMY SNARE/COLD BIOPSY: Performed by: SURGERY

## 2020-10-19 PROCEDURE — 6360000002 HC RX W HCPCS: Performed by: NURSE ANESTHETIST, CERTIFIED REGISTERED

## 2020-10-19 PROCEDURE — 3700000001 HC ADD 15 MINUTES (ANESTHESIA): Performed by: SURGERY

## 2020-10-19 PROCEDURE — 88305 TISSUE EXAM BY PATHOLOGIST: CPT

## 2020-10-19 PROCEDURE — 82947 ASSAY GLUCOSE BLOOD QUANT: CPT

## 2020-10-19 RX ORDER — LIDOCAINE HYDROCHLORIDE 40 MG/ML
INJECTION, SOLUTION RETROBULBAR; TOPICAL PRN
Status: DISCONTINUED | OUTPATIENT
Start: 2020-10-19 | End: 2020-10-19 | Stop reason: SDUPTHER

## 2020-10-19 RX ORDER — SODIUM CHLORIDE, SODIUM LACTATE, POTASSIUM CHLORIDE, CALCIUM CHLORIDE 600; 310; 30; 20 MG/100ML; MG/100ML; MG/100ML; MG/100ML
INJECTION, SOLUTION INTRAVENOUS CONTINUOUS PRN
Status: DISCONTINUED | OUTPATIENT
Start: 2020-10-19 | End: 2020-10-19 | Stop reason: SDUPTHER

## 2020-10-19 RX ORDER — PROPOFOL 10 MG/ML
INJECTION, EMULSION INTRAVENOUS PRN
Status: DISCONTINUED | OUTPATIENT
Start: 2020-10-19 | End: 2020-10-19 | Stop reason: SDUPTHER

## 2020-10-19 RX ADMIN — PROPOFOL 400 MG: 10 INJECTION, EMULSION INTRAVENOUS at 10:35

## 2020-10-19 RX ADMIN — LIDOCAINE HYDROCHLORIDE 100 MG: 40 INJECTION, SOLUTION RETROBULBAR; TOPICAL at 10:35

## 2020-10-19 RX ADMIN — SODIUM CHLORIDE, POTASSIUM CHLORIDE, SODIUM LACTATE AND CALCIUM CHLORIDE: 600; 310; 30; 20 INJECTION, SOLUTION INTRAVENOUS at 10:29

## 2020-10-19 ASSESSMENT — PAIN SCALES - GENERAL
PAINLEVEL_OUTOF10: 0
PAINLEVEL_OUTOF10: 0

## 2020-10-19 ASSESSMENT — PAIN - FUNCTIONAL ASSESSMENT: PAIN_FUNCTIONAL_ASSESSMENT: 0-10

## 2020-10-19 NOTE — ANESTHESIA PRE PROCEDURE
medications on file. No current facility-administered medications for this visit. 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    Febrile illness R50.9    Bacterial pneumonia J15.9    Controlled type 2 diabetes mellitus without complication, with long-term current use of insulin (HCC) E11.9, Z79.4    Dyslipidemia E78.5    Other specified hypothyroidism E03.8    Schizophrenia ([facility] F20.9    Moderate malnutrition ([facility]) E44.0       Past Medical History:        Diagnosis Date    Depression     Diabetes mellitus ([facility]     Thyroid disease     Hypothyroid       Past Surgical History:        Procedure Laterality Date    ABDOMEN SURGERY      CHOLECYSTECTOMY      COLONOSCOPY  2019    COLONOSCOPY FLEXIBLE W/ DECOMPRESSION performed by Lori Hernandez MD at [facility]    COLONOSCOPY N/A 2019    COLONOSCOPY FLEXIBLE W/ DECOMPRESSION performed by Devon Rhodes MD at [address] [place] [address]         Social History:    Social History     Tobacco Use    Smoking status: Former Smoker     Packs/day: 1.00     Years: 40.00     Pack years: 40.00     Types: Cigarettes     Last attempt to quit: 3/6/2019     Years since quittin.6    Smokeless tobacco: Never Used    Tobacco comment: Alberto Gonzalez RRT 19   Substance Use Topics    Alcohol use: No                                Counseling given: Not Answered  Comment: Alberto Gonzalez RRT 19      Vital Signs (Current): There were no vitals filed for this visit.                                            BP Readings from Last 3 Encounters:   10/19/20 125/60   10/01/20 128/64   20 130/80       NPO Status: BMI:   Wt Readings from Last 3 Encounters:   10/19/20 190 lb (86.2 kg)   10/01/20 197 lb (89.4 kg)   09/08/20 200 lb (90.7 kg)     There is no height or weight on file to calculate BMI.    CBC:   Lab Results   Component Value Date    WBC 12.5 07/20/2020    RBC 4.69 07/20/2020    HGB 13.9 07/20/2020    HCT 41.5 07/20/2020    MCV 88.5 07/20/2020    RDW 13.5 07/20/2020     07/20/2020       CMP:   Lab Results   Component Value Date     09/01/2020    K 4.4 09/01/2020    CL 93 09/01/2020    CO2 21 09/01/2020    BUN 9 09/01/2020    CREATININE 0.83 09/01/2020    GFRAA >60 09/01/2020    LABGLOM >60 09/01/2020    GLUCOSE 178 09/01/2020    PROT 7.1 07/20/2020    CALCIUM 9.9 09/01/2020    BILITOT 0.34 07/20/2020    ALKPHOS 121 07/20/2020    AST 43 07/20/2020    ALT 52 07/20/2020       POC Tests:   No results for input(s): POCGLU, POCNA, POCK, POCCL, POCBUN, POCHEMO, POCHCT in the last 72 hours. Coags:   Lab Results   Component Value Date    PROTIME 10.8 08/12/2019    INR 1.0 08/12/2019       HCG (If Applicable): No results found for: PREGTESTUR, PREGSERUM, HCG, HCGQUANT     ABGs: No results found for: PHART, PO2ART, EFB3DZF, OUF6KTG, BEART, Q0EPNOMJ     Type & Screen (If Applicable):  No results found for: [facility]    Anesthesia Evaluation  Patient summary reviewed and Nursing notes reviewed no history of anesthetic complications:   Airway: Mallampati: III  TM distance: >3 FB   Neck ROM: full  Mouth opening: > = 3 FB Dental:          Pulmonary:normal exam        (-) pneumonia                           Cardiovascular:            Rhythm: regular  Rate: normal                 ROS comment: EKG 8/2019    Normal sinus rhythm with sinus arrhythmia  Normal ECG     Neuro/Psych:   (+) psychiatric history:depression/anxiety             GI/Hepatic/Renal:        (-) bowel prep      ROS comment: Distended abdomen, unprepped colonoscopy.    Endo/Other:    (+) DiabetesType II DM, no interval change, , hypothyroidism::., .                 Abdominal:       Abdomen: tender. Vascular:                                          Anesthesia Plan      general and TIVA     ASA 2       Induction: intravenous. Anesthetic plan and risks discussed with patient.       Plan discussed with surgical team.                  Oralee Seip, APRN - CRNA   10/19/2020

## 2020-10-19 NOTE — H&P
[facility]      Patient's Name/ Date of Birth/ Gender: Zeina #Rakesh Baumann / 1960 (61 y.o.) / female     Attending physician: La Miguel    CC: positive FIT test    History of present Illness: Zeina Baumann is a 61 y.o. female, presents today for colonoscopy for positive FIT test.    Past Medical History:  has a past medical history of Depression, Diabetes mellitus ([address] and Thyroid disease. Past Surgical History:   Past Surgical History:   Procedure Laterality Date    ABDOMEN SURGERY      CHOLECYSTECTOMY      COLONOSCOPY  8/12/2019    COLONOSCOPY FLEXIBLE W/ DECOMPRESSION performed by Heloise Peabody, MD at [facility]    COLONOSCOPY N/A 8/16/2019    COLONOSCOPY FLEXIBLE W/ DECOMPRESSION performed by Shelby Up MD at [address] [place] [address]         Social History:  reports that she quit smoking about 19 months ago. Her smoking use included cigarettes. She has a 40.00 pack-year smoking history. She has never used smokeless tobacco. She reports that she does not drink alcohol or use drugs. Family History: family history includes Diabetes in her mother; Heart Disease in her mother; Stroke in her brother. Review of Systems:   General: Denies fever, chills, night sweats, weight loss, malaise, fatigue  HEENT: Denies sore throat, sinus problems, allergic rhinosinusitis  Card: Denies chest pain, palpitations, orthopnea/PND. Denies h/o murmurs  Pulm: Denies cough, shortness of breath, MORRIS  GI:  per HPI; denies history of constipation, diarrhea, hematochezia or melena  : Denies polyuria, dysuria, hematuria  Endo: Denies diabetes, thyroid problems. Heme: Denies anemia, h/o bleeding or clotting problems. Neuro: Denies h/o CVA, TIA  Skin: Denies rashes, ulcers  Musculoskeletal: Denies muscle, joint, back pain.     Allergies: Bicillin [penicillin g benzathine] and Ziprasidone    Current Meds:No current facility-administered medications for this encounter. Vital Signs:  Vitals:    10/19/20 0930   BP: 125/60   Pulse: 87   Resp: 14   Temp: 97.8 °F (36.6 °C)   SpO2: 96%       Physical Exam:  Vital signs and Nurse's note reviewed  Gen:  A&Ox3, NAD  HEENT: NCAT, PERRLA, EOMI, no scleral icterus, oral mucosa moist  Neck: Trachea midline without obvious masses or lesions  Chest: Symmetric rise with inhalation, no evidence of trauma  CVS: S1S2  Resp: Good bilateral air entry, no audible wheeze or rhonchi  Abd: soft, non-tender, non-distended, no hepatosplenomegaly or palpable masses  Ext: No clubbing, cyanosis, edema, peripheral pulses 2+ Rad/Fem/DP/PT  CNS: Moves all extremities, no gross focal motor deficits  Skin: No erythema or ulcerations         Assessment:    Savanna Helton is a 61 y.o. female presents for colonoscopy for positive FIT test     Plan:    1.  To OR for colonoscopy, all questions answered, written informed consent obtained      Joanne Zuniga  10/19/2020

## 2020-10-19 NOTE — OP NOTE
Operative Note      Patient: Gena Milian  YOB: 1960  MRN: [id number]    Date of Procedure: 10/19/2020    Pre-Op Diagnosis: positive FIT test    Post-Op Diagnosis: Large polyp vs mass proximal sigmoid colon, additional polyp x2 distal sigmoid, polyp rectum, diverticulosis descending/sigmoid colon, internal/external hemorrhoids       Procedure(s):  Diagnostic colonoscopy  Submucosal tattoo and snare excisionof Sigmoid mass vs polyp  Snare polypectomy distal sigmoid colon polyp x2 7mm and 6mm  Snare polypectomy rectum polyp 6mm      Surgeon(s):  Jacey Murguia DO    Assistant:   * No surgical staff found *    Anesthesia: General    Estimated Blood Loss (mL): Minimal    Complications: None    Specimens:   ID Type Source Tests Collected by Time Destination   A : sigmoid colon mass Tissue Colon SURGICAL PATHOLOGY [facility],  10/19/2020 1051    B : polyp sigmoid colon Tissue Colon SURGICAL PATHOLOGY [facility],  10/19/2020 1055    C : sigmoid colon polyp #2 Tissue Colon SURGICAL PATHOLOGY [facility],  10/19/2020 1056    D : polyp rectum Tissue Colon SURGICAL PATHOLOGY [facility],  10/19/2020 1057        Implants:  * No implants in log *      Drains: * No LDAs found *    Findings: as above    Detailed Description of Procedure:       INDICATIONS FOR PROCEDURE:   The patient is a 61y.o. year old female with history of above preop diagnosis. Colonoscopy with possible biopsy or polypectomy was recommend, the risk, benefits, expected outcome, and alternatives to the procedure were explained. Risks included but are not limited to bleeding, infection, respiratory distress, hypotension, and perforation of the colon. The patient understands and is in agreement. PROCEDURE DETAILS:  Patient was brought to the endoscopy suite and placed in the left lateral recumbent position. A time out was completed verifying correct patient, procedure and equipment. Anesthesia was induced using MAC guidelines.   A digital rectal exam was performed. The colonoscope was inserted into the rectum without difficulty and the scope was advanced to the cecum which was identified by anatomy and by palpation. Bowel prep was adequate. The scope was slowly withdrawn visualizing the cecum, ascending colon, transverse colon, proximal descending or rectosigmoid colon. The scope was withdrawn to the rectal vault and then retroflexed. The scope was then straightened and removed. The patient tolerated the procedure well and was transferred to the recovery unit in stable condition. Findings:  Cecum/Ascending colon: normal    Transverse colon: normal    Descending/Sigmoid colon: mild/moderate diverticulosis. Large discolored mass vs polyp of the proximal sigmoid colon, tattoo injection submucosally then removed with hot snare. Two additional polyps distal sigmoid colon removed with hot snare    Rectum/Anus: polyp removed with hot snare. Internal/external hemorrhoids without complication    Greater than 6 minutes was spent withdrawing the colonoscope. IMPRESSION/PLAN:   1. Increase dietary fiber and water  2.  Patient is advised to have a repeat colonoscopy in 3 years unless otherwise dictated by pathology, sooner if blood in the stool, unexplained weight loss, or change in the bowels          Electronically signed by Hiro Garcia DO on 10/19/2020 at 11:01 AM

## 2020-10-19 NOTE — ANESTHESIA POSTPROCEDURE EVALUATION
Department of Anesthesiology  Postprocedure Note    Patient: Destiny Schilling  MRN: [id number]  YOB: 1960  Date of evaluation: 10/19/2020  Time:  11:00 AM     Procedure Summary     Date:  10/19/20 Room / Location:  [address]    Anesthesia Start:  8669 Anesthesia Stop:      Procedure:  COLONOSCOPY, POLYPECTOMY, SNARE, AND TATTOOING OF SIGMOID COLON MASS SITE (N/A ) Diagnosis:  (positive FIT test)    Surgeon:  Isidro Salinas DO Responsible Provider:  DESIREE Harden CRNA    Anesthesia Type:  general, TIVA ASA Status:  2          Anesthesia Type: general, TIVA    Hien Phase I: Hien Score: 10    Hien Phase II:      Last vitals: Reviewed and per EMR flowsheets.        Anesthesia Post Evaluation    Patient location during evaluation: PACU  Patient participation: complete - patient participated  Level of consciousness: awake and alert  Pain score: 0  Airway patency: patent  Nausea & Vomiting: no nausea and no vomiting  Complications: no  Cardiovascular status: blood pressure returned to baseline and hemodynamically stable  Respiratory status: acceptable  Hydration status: euvolemic

## 2020-10-21 LAB — SURGICAL PATHOLOGY REPORT: NORMAL

## 2020-11-19 ENCOUNTER — HOSPITAL ENCOUNTER (EMERGENCY)
Age: 60
Discharge: HOME | End: 2020-11-19
Attending: EMERGENCY MEDICINE
Payer: MEDICARE

## 2020-11-19 ENCOUNTER — APPOINTMENT (OUTPATIENT)
Dept: GENERAL RADIOLOGY | Age: 60
End: 2020-11-19
Payer: MEDICARE

## 2020-11-19 ENCOUNTER — APPOINTMENT (OUTPATIENT)
Dept: CT IMAGING | Age: 60
End: 2020-11-19
Payer: MEDICARE

## 2020-11-19 VITALS
TEMPERATURE: 97.9 F | OXYGEN SATURATION: 96 % | SYSTOLIC BLOOD PRESSURE: 133 MMHG | RESPIRATION RATE: 16 BRPM | HEART RATE: 78 BPM | DIASTOLIC BLOOD PRESSURE: 78 MMHG

## 2020-11-19 LAB
ANION GAP SERPL CALCULATED.3IONS-SCNC: 13 MMOL/L (ref 9–17)
BUN BLDV-MCNC: 19 MG/DL (ref 6–20)
BUN/CREAT BLD: 22 (ref 9–20)
CALCIUM SERPL-MCNC: 10.2 MG/DL (ref 8.6–10.4)
CHLORIDE BLD-SCNC: 97 MMOL/L (ref 98–107)
CO2: 28 MMOL/L (ref 20–31)
CREAT SERPL-MCNC: 0.88 MG/DL (ref 0.5–0.9)
D-DIMER QUANTITATIVE: 0.66 MG/L FEU (ref 0–0.59)
GFR AFRICAN AMERICAN: >60 ML/MIN
GFR NON-AFRICAN AMERICAN: >60 ML/MIN
GFR SERPL CREATININE-BSD FRML MDRD: ABNORMAL ML/MIN/{1.73_M2}
GFR SERPL CREATININE-BSD FRML MDRD: ABNORMAL ML/MIN/{1.73_M2}
GLUCOSE BLD-MCNC: 144 MG/DL (ref 70–99)
POTASSIUM SERPL-SCNC: 3.3 MMOL/L (ref 3.7–5.3)
SODIUM BLD-SCNC: 138 MMOL/L (ref 135–144)

## 2020-11-19 PROCEDURE — 85379 FIBRIN DEGRADATION QUANT: CPT

## 2020-11-19 PROCEDURE — 99284 EMERGENCY DEPT VISIT MOD MDM: CPT

## 2020-11-19 PROCEDURE — 2709999900 CT CHEST PULMONARY EMBOLISM W CONTRAST

## 2020-11-19 PROCEDURE — 6360000004 HC RX CONTRAST MEDICATION: Performed by: EMERGENCY MEDICINE

## 2020-11-19 PROCEDURE — 71045 X-RAY EXAM CHEST 1 VIEW: CPT

## 2020-11-19 PROCEDURE — 36415 COLL VENOUS BLD VENIPUNCTURE: CPT

## 2020-11-19 PROCEDURE — 80048 BASIC METABOLIC PNL TOTAL CA: CPT

## 2020-11-19 RX ADMIN — IOPAMIDOL 75 ML: 755 INJECTION, SOLUTION INTRAVENOUS at 03:24

## 2020-11-19 ASSESSMENT — ENCOUNTER SYMPTOMS
WHEEZING: 0
GASTROINTESTINAL NEGATIVE: 1
ALLERGIC/IMMUNOLOGIC NEGATIVE: 1
EYES NEGATIVE: 1
STRIDOR: 0

## 2020-11-19 NOTE — ED PROVIDER NOTES
eMERGENCY dEPARTMENT eNCOUnter      Pt Name: Gena Milian  MRN: [id number]  [place] 1960  Date of evaluation: 11/19/2020      CHIEF COMPLAINT       Chief Complaint   Patient presents with    Shortness of Breath     since 10 pm          HISTORY OF PRESENT ILLNESS    Gena Milian is a 61 y.o. female who presents to the emergency department complaining of a several hour history of subjective feeling of shortness of breath. Patient saturations have been good she is afebrile she is nontoxic looking she is not wheezing- she has no sign of any infectious disease. REVIEW OF SYSTEMS       Review of Systems   Constitutional: Negative. HENT: Negative. Eyes: Negative. Respiratory: Negative for wheezing and stridor. Cardiovascular: Negative. Gastrointestinal: Negative. Endocrine: Negative. Genitourinary: Negative. Musculoskeletal: Negative. Skin: Negative. Allergic/Immunologic: Negative. Neurological: Negative. Hematological: Negative. Psychiatric/Behavioral: Negative. PAST MEDICAL HISTORY    has a past medical history of Depression, Diabetes mellitus ([facility] and Thyroid disease. SURGICAL HISTORY      has a past surgical history that includes Abdomen surgery; Cholecystectomy; Tubal ligation (1985); Umbilical hernia repair; Colonoscopy (8/12/2019); Colonoscopy (N/A, 8/16/2019); and Colonoscopy (N/A, 10/19/2020).     CURRENT MEDICATIONS       Previous Medications    ATORVASTATIN (LIPITOR) 20 MG TABLET    Take 20 mg by mouth daily     DIPHENHYDRAMINE (BENADRYL) 25 MG CAPSULE    Take 2 capsules by mouth every 6 hours as needed for Itching    GLIMEPIRIDE (AMARYL) 2 MG TABLET    Take 2 mg by mouth every morning (before breakfast)     INSULIN GLARGINE-LIXISENATIDE (SOLIQUA) 100-33 UNT-MCG/ML SOPN    Inject 10 Units into the skin daily    LACTULOSE (CHRONULAC) 10 GM/15ML SOLUTION    Take 15 mLs by mouth 3 times daily    LEVOTHYROXINE (SYNTHROID) 25 MCG TABLET    25 mcg Daily     LISINOPRIL (PRINIVIL;ZESTRIL) 2.5 MG TABLET    2.5 mg daily     MELATONIN 3 MG TABS TABLET    Take 5 mg by mouth nightly as needed    METFORMIN (GLUCOPHAGE) 1000 MG TABLET    Take 1 tablet by mouth 2 times daily (with meals)    METOPROLOL TARTRATE (LOPRESSOR) 25 MG TABLET    Take 1 tablet by mouth 2 times daily    TRAZODONE (DESYREL) 50 MG TABLET    nightly        ALLERGIES     is allergic to bicillin [penicillin g benzathine] and ziprasidone. FAMILY HISTORY     She indicated that the status of her mother is unknown. She indicated that the status of her brother is unknown.     family history includes Diabetes in her mother; Heart Disease in her mother; Stroke in her brother. SOCIAL HISTORY      reports that she quit smoking about 20 months ago. Her smoking use included cigarettes. She has a 40.00 pack-year smoking history. She has never used smokeless tobacco. She reports that she does not drink alcohol or use drugs. PHYSICAL EXAM     INITIAL VITALS:  vitals were not taken for this visit.      DIFFERENTIAL DIAGNOSIS/ MDM:         DIAGNOSTIC RESULTS     EKG: All EKG's are interpreted by the Emergency Department Physician who either signs or Co-signs this chart in the absence of a cardiologist.        Not indicated unless otherwise documented above    LABS:  Results for orders placed or performed during the hospital encounter of 11/19/20   D-Dimer, Quantitative   Result Value Ref Range    D-Dimer, Quant 0.66 (H) 0.00 - 0.59 mg/L FEU   Basic Metabolic Panel   Result Value Ref Range    Glucose 144 (H) 70 - 99 mg/dL    BUN 19 6 - 20 mg/dL    CREATININE 0.88 0.50 - 0.90 mg/dL    Bun/Cre Ratio 22 (H) 9 - 20    Calcium 10.2 8.6 - 10.4 mg/dL    Sodium 138 135 - 144 mmol/L    Potassium 3.3 (L) 3.7 - 5.3 mmol/L    Chloride 97 (L) 98 - 107 mmol/L    CO2 28 20 - 31 mmol/L    Anion Gap 13 9 - 17 mmol/L    GFR Non-African American >60 >60 mL/min    GFR African American >60 >60 mL/min    GFR Comment          GFR

## 2020-12-10 ENCOUNTER — OFFICE VISIT (OUTPATIENT)
Dept: PODIATRY | Age: 60
End: 2020-12-10
Payer: MEDICARE

## 2020-12-10 VITALS
BODY MASS INDEX: 31.38 KG/M2 | DIASTOLIC BLOOD PRESSURE: 80 MMHG | HEART RATE: 80 BPM | RESPIRATION RATE: 20 BRPM | WEIGHT: 188.6 LBS | SYSTOLIC BLOOD PRESSURE: 120 MMHG

## 2020-12-10 PROCEDURE — 99999 PR OFFICE/OUTPT VISIT,PROCEDURE ONLY: CPT | Performed by: PODIATRIST

## 2020-12-10 PROCEDURE — 11720 DEBRIDE NAIL 1-5: CPT | Performed by: PODIATRIST

## 2020-12-10 NOTE — PROGRESS NOTES
Foot Care Worksheet  PCP: DESIREE Herrera  Last visit:  10 / 14 / 2020 per patient      Nail description:  Thick , Yellow , Crumbly , Marked limitation of ambulation     Pain resulting from thickened and dystrophy of nail plate No    Nails involved  Right   none  (T5-T9)  Left     2  (TA-T4)    Q7 1 Class A Finding - Non traumatic amputation of foot No    Q8 2 Class B Findings - Absent DP pulse No, Absent PT pulse No, Advanced tropic changes (3 required) Yes    Decrease hair growth Yes, Nail changes/thickening Yes, Pigmented changes/discoloration Yes, Skin texture (thin, shiny) No, Skin color (rubor/redness) No    Q9 1 Class B and 2 Class C Findings  Claudication No, Temperature change Yes, Paresthesia No, Burning No, Edema Yes

## 2020-12-10 NOTE — PROGRESS NOTES
Subjective:  Patient presents to [facility] today for routine foot care. Patient denies any new problems with their feet. Allergies   Allergen Reactions    Bicillin [Penicillin G Benzathine] Shortness Of Breath    Ziprasidone Other (See Comments)     \"I can't sit still\"  Pt could not sit still, hyper         Past Medical History:   Diagnosis Date    Depression     Diabetes mellitus ([address]     Thyroid disease     Hypothyroid       Prior to Admission medications    Medication Sig Start Date End Date Taking?  Authorizing Provider   traZODone (DESYREL) 50 MG tablet nightly  6/30/20  Yes Historical Provider, MD   atorvastatin (LIPITOR) 20 MG tablet Take 20 mg by mouth daily  11/12/19  Yes Historical Provider, MD   glimepiride (AMARYL) 2 MG tablet Take 2 mg by mouth every morning (before breakfast)  11/8/19  Yes Historical Provider, MD   lisinopril (PRINIVIL;ZESTRIL) 2.5 MG tablet 2.5 mg daily  11/8/19  Yes Historical Provider, MD   levothyroxine (SYNTHROID) 25 MCG tablet 25 mcg Daily  11/8/19  Yes Historical Provider, MD   Insulin Glargine-Lixisenatide (SOLIQUA) 100-33 UNT-MCG/ML SOPN Inject 10 Units into the skin daily  Patient taking differently: Inject 40 Units into the skin daily  9/23/19  Yes Sonal Mcmahon MD   metoprolol tartrate (LOPRESSOR) 25 MG tablet Take 1 tablet by mouth 2 times daily 9/23/19  Yes Sonal Mcmahon MD   metFORMIN (GLUCOPHAGE) 1000 MG tablet Take 1 tablet by mouth 2 times daily (with meals) 9/23/19  Yes Sonal Mcmahon MD   lactulose (CHRONULAC) 10 GM/15ML solution Take 15 mLs by mouth 3 times daily 8/29/19  Yes July Chaudhari MD   melatonin 3 MG TABS tablet Take 5 mg by mouth nightly as needed   Yes Historical Provider, MD   diphenhydrAMINE (BENADRYL) 25 MG capsule Take 2 capsules by mouth every 6 hours as needed for Itching 11/4/18  Yes Federico Mayer MD       Social History     Tobacco Use    Smoking status: Former Smoker     Packs/day: 1.00     Years: 40.00     Pack years: 40. 00     Types: Cigarettes     Last attempt to quit: 3/6/2019     Years since quittin.7    Smokeless tobacco: Never Used    Tobacco comment: Nadine Close RRT 19   Substance Use Topics    Alcohol use: No     ROS: All 14 ROS systems reviewed and pertinent positives noted above, all others negative. Objective:  Vascular: DP and PT pulses palpable 2/4, bilateral.  CFT <3 seconds, bilateral.  Hair growth present to the level of the digits, bilateral.  Edema absent, bilateral.  Varicosities absent, bilateral. Erythema absent, bilateral. Distal Rubor absent bilateral.  Temperature within normal limits bilateral. Hyperpigmentation absent bilateral. No atrophic skin. Neurological: Sensation intact to light touch to level of digits, bilateral.  Protective sensation intact 10/10 sites via 5.07/10g [place]-Rylan Monofilament, bilateral.  negative Tinel's, bilateral.  negative Valleix sign, bilateral.  Vibratory intact bilateral.  Reflexes Decreased bilateral.  Paresthesias negative. Dysthesias negative. Sharp/dull intact bilateral.    Integument:  Nails left 2 and right none thickened, dystrophic and crumbly, discolored with subungual debris. Hyperkeratotic tissue is absent. Assessment:   Diagnosis Orders   1. Controlled type 2 diabetes mellitus without complication, with long-term current use of insulin ([address]     2. Dermatophytosis of nail         Plan:  Nails as mentioned above debrided in length and thickness. Patient advised OTC methods for treatment of the mycotic nails. Patient will follow up in 10 weeks.

## 2021-01-22 ENCOUNTER — HOSPITAL ENCOUNTER (OUTPATIENT)
Age: 61
Setting detail: SPECIMEN
Discharge: HOME | End: 2021-01-22
Payer: COMMERCIAL

## 2021-01-23 LAB
DIRECT EXAM: NORMAL
Lab: NORMAL
SPECIMEN DESCRIPTION: NORMAL

## 2021-01-24 LAB
CULTURE: NORMAL
Lab: NORMAL
SPECIMEN DESCRIPTION: NORMAL

## 2021-02-18 ENCOUNTER — OFFICE VISIT (OUTPATIENT)
Dept: PODIATRY | Age: 61
End: 2021-02-18
Payer: COMMERCIAL

## 2021-02-18 ENCOUNTER — HOSPITAL ENCOUNTER (OUTPATIENT)
Dept: LAB | Age: 61
Discharge: HOME | End: 2021-02-18
Payer: COMMERCIAL

## 2021-02-18 VITALS
HEART RATE: 80 BPM | WEIGHT: 183 LBS | DIASTOLIC BLOOD PRESSURE: 70 MMHG | RESPIRATION RATE: 20 BRPM | SYSTOLIC BLOOD PRESSURE: 126 MMHG | BODY MASS INDEX: 30.45 KG/M2

## 2021-02-18 DIAGNOSIS — E11.9 CONTROLLED TYPE 2 DIABETES MELLITUS WITHOUT COMPLICATION, WITH LONG-TERM CURRENT USE OF INSULIN (HCC): Primary | ICD-10-CM

## 2021-02-18 DIAGNOSIS — B35.1 DERMATOPHYTOSIS OF NAIL: ICD-10-CM

## 2021-02-18 DIAGNOSIS — Z79.4 CONTROLLED TYPE 2 DIABETES MELLITUS WITHOUT COMPLICATION, WITH LONG-TERM CURRENT USE OF INSULIN (HCC): Primary | ICD-10-CM

## 2021-02-18 LAB
ABSOLUTE EOS #: 0.32 K/UL (ref 0–0.44)
ABSOLUTE IMMATURE GRANULOCYTE: 0.05 K/UL (ref 0–0.3)
ABSOLUTE LYMPH #: 2.6 K/UL (ref 1.1–3.7)
ABSOLUTE MONO #: 0.62 K/UL (ref 0.1–1.2)
ALBUMIN SERPL-MCNC: 4.5 G/DL (ref 3.5–5.2)
ALBUMIN/GLOBULIN RATIO: 1.3 (ref 1–2.5)
ALP BLD-CCNC: 139 U/L (ref 35–104)
ALT SERPL-CCNC: 16 U/L (ref 5–33)
ANION GAP SERPL CALCULATED.3IONS-SCNC: 13 MMOL/L (ref 9–17)
AST SERPL-CCNC: 16 U/L
BASOPHILS # BLD: 1 % (ref 0–2)
BASOPHILS ABSOLUTE: 0.09 K/UL (ref 0–0.2)
BILIRUB SERPL-MCNC: 0.23 MG/DL (ref 0.3–1.2)
BUN BLDV-MCNC: 12 MG/DL (ref 8–23)
BUN/CREAT BLD: 15 (ref 9–20)
CALCIUM SERPL-MCNC: 9.9 MG/DL (ref 8.6–10.4)
CHLORIDE BLD-SCNC: 99 MMOL/L (ref 98–107)
CHOLESTEROL/HDL RATIO: 3.7
CHOLESTEROL: 139 MG/DL
CO2: 26 MMOL/L (ref 20–31)
CREAT SERPL-MCNC: 0.81 MG/DL (ref 0.5–0.9)
DIFFERENTIAL TYPE: ABNORMAL
EOSINOPHILS RELATIVE PERCENT: 3 % (ref 1–4)
GFR AFRICAN AMERICAN: >60 ML/MIN
GFR NON-AFRICAN AMERICAN: >60 ML/MIN
GFR SERPL CREATININE-BSD FRML MDRD: ABNORMAL ML/MIN/{1.73_M2}
GFR SERPL CREATININE-BSD FRML MDRD: ABNORMAL ML/MIN/{1.73_M2}
GLUCOSE BLD-MCNC: 82 MG/DL (ref 70–99)
HCT VFR BLD CALC: 44.6 % (ref 36.3–47.1)
HDLC SERPL-MCNC: 38 MG/DL
HEMOGLOBIN: 14 G/DL (ref 11.9–15.1)
IMMATURE GRANULOCYTES: 0 %
LDL CHOLESTEROL: 58 MG/DL (ref 0–130)
LYMPHOCYTES # BLD: 21 % (ref 24–43)
MCH RBC QN AUTO: 27.7 PG (ref 25.2–33.5)
MCHC RBC AUTO-ENTMCNC: 31.4 G/DL (ref 25.2–33.5)
MCV RBC AUTO: 88.3 FL (ref 82.6–102.9)
MONOCYTES # BLD: 5 % (ref 3–12)
NRBC AUTOMATED: 0 PER 100 WBC
PDW BLD-RTO: 14.2 % (ref 11.8–14.4)
PLATELET # BLD: 278 K/UL (ref 138–453)
PLATELET ESTIMATE: ABNORMAL
PMV BLD AUTO: 10.2 FL (ref 8.1–13.5)
POTASSIUM SERPL-SCNC: 4.5 MMOL/L (ref 3.7–5.3)
RBC # BLD: 5.05 M/UL (ref 3.95–5.11)
RBC # BLD: ABNORMAL 10*6/UL
SEG NEUTROPHILS: 70 % (ref 36–65)
SEGMENTED NEUTROPHILS ABSOLUTE COUNT: 8.96 K/UL (ref 1.5–8.1)
SODIUM BLD-SCNC: 138 MMOL/L (ref 135–144)
TOTAL PROTEIN: 7.9 G/DL (ref 6.4–8.3)
TRIGL SERPL-MCNC: 214 MG/DL
TSH SERPL DL<=0.05 MIU/L-ACNC: 2.06 MIU/L (ref 0.3–5)
VLDLC SERPL CALC-MCNC: ABNORMAL MG/DL (ref 1–30)
WBC # BLD: 12.6 K/UL (ref 3.5–11.3)
WBC # BLD: ABNORMAL 10*3/UL

## 2021-02-18 PROCEDURE — 84443 ASSAY THYROID STIM HORMONE: CPT

## 2021-02-18 PROCEDURE — 11720 DEBRIDE NAIL 1-5: CPT | Performed by: PODIATRIST

## 2021-02-18 PROCEDURE — 80061 LIPID PANEL: CPT

## 2021-02-18 PROCEDURE — 80053 COMPREHEN METABOLIC PANEL: CPT

## 2021-02-18 PROCEDURE — 85025 COMPLETE CBC W/AUTO DIFF WBC: CPT

## 2021-02-18 PROCEDURE — 36415 COLL VENOUS BLD VENIPUNCTURE: CPT

## 2021-02-18 RX ORDER — LOXAPINE SUCCINATE 10 MG/1
TABLET ORAL
COMMUNITY
Start: 2021-01-22

## 2021-02-18 NOTE — PROGRESS NOTES
Subjective:  Patient presents to [facility] today for routine foot care. Patient denies any new problems with their feet. Allergies   Allergen Reactions    Bicillin [Penicillin G Benzathine] Shortness Of Breath    Ziprasidone Other (See Comments)     \"I can't sit still\"  Pt could not sit still, hyper         Past Medical History:   Diagnosis Date    Depression     Diabetes mellitus ([address]     Thyroid disease     Hypothyroid       Prior to Admission medications    Medication Sig Start Date End Date Taking?  Authorizing Provider   loxapine (LOXITANE) 10 MG capsule  1/22/21  Yes Historical Provider, MD   traZODone (DESYREL) 50 MG tablet nightly  6/30/20  Yes Historical Provider, MD   atorvastatin (LIPITOR) 20 MG tablet Take 20 mg by mouth daily  11/12/19  Yes Historical Provider, MD   glimepiride (AMARYL) 2 MG tablet Take 2 mg by mouth every morning (before breakfast)  11/8/19  Yes Historical Provider, MD   lisinopril (PRINIVIL;ZESTRIL) 2.5 MG tablet 2.5 mg daily  11/8/19  Yes Historical Provider, MD   levothyroxine (SYNTHROID) 25 MCG tablet 25 mcg Daily  11/8/19  Yes Historical Provider, MD   Insulin Glargine-Lixisenatide (SOLIQUA) 100-33 UNT-MCG/ML SOPN Inject 10 Units into the skin daily  Patient taking differently: Inject 40 Units into the skin daily  9/23/19  Yes Chris Reynoso MD   metoprolol tartrate (LOPRESSOR) 25 MG tablet Take 1 tablet by mouth 2 times daily 9/23/19  Yes Chris Reynoso MD   metFORMIN (GLUCOPHAGE) 1000 MG tablet Take 1 tablet by mouth 2 times daily (with meals) 9/23/19  Yes Chris Reynoso MD   lactulose (CHRONULAC) 10 GM/15ML solution Take 15 mLs by mouth 3 times daily 8/29/19  Yes Thien Wilkinson MD   melatonin 3 MG TABS tablet Take 5 mg by mouth nightly as needed   Yes Historical Provider, MD   diphenhydrAMINE (BENADRYL) 25 MG capsule Take 2 capsules by mouth every 6 hours as needed for Itching 11/4/18  Yes Imani Wilson MD       Social History     Tobacco Use    Smoking status: Former Smoker     Packs/day: 1.00     Years: 40.00     Pack years: 40.00     Types: Cigarettes     Quit date: 3/6/2019     Years since quittin.9    Smokeless tobacco: Never Used    Tobacco comment: Aysha Murcia RRT 19   Substance Use Topics    Alcohol use: No     ROS: All 14 ROS systems reviewed and pertinent positives noted above, all others negative. Objective:  Vascular: DP and PT pulses palpable 2/4, bilateral.  CFT <3 seconds, bilateral.  Hair growth present to the level of the digits, bilateral.  Edema absent, bilateral.  Varicosities absent, bilateral. Erythema absent, bilateral. Distal Rubor absent bilateral.  Temperature within normal limits bilateral. Hyperpigmentation absent bilateral. No atrophic skin. Neurological: Sensation intact to light touch to level of digits, bilateral.  Protective sensation intact 10/10 sites via 5.07/10g [place]-Rylan Monofilament, bilateral.  negative Tinel's, bilateral.  negative Valleix sign, bilateral.  Vibratory intact bilateral.  Reflexes Decreased bilateral.  Paresthesias negative. Dysthesias negative. Sharp/dull intact bilateral.    Integument:  Nails left 2 and right none thickened, dystrophic and crumbly, discolored with subungual debris. Hyperkeratotic tissue is absent. Assessment:   Diagnosis Orders   1. Controlled type 2 diabetes mellitus without complication, with long-term current use of insulin ([address]     2. Dermatophytosis of nail         Plan:  Nails as mentioned above debrided in length and thickness. Patient advised OTC methods for treatment of the mycotic nails. Patient will follow up in 10 weeks.

## 2021-04-29 ENCOUNTER — OFFICE VISIT (OUTPATIENT)
Dept: PODIATRY | Age: 61
End: 2021-04-29
Payer: COMMERCIAL

## 2021-04-29 VITALS
DIASTOLIC BLOOD PRESSURE: 74 MMHG | WEIGHT: 185 LBS | BODY MASS INDEX: 29.73 KG/M2 | HEIGHT: 66 IN | HEART RATE: 82 BPM | SYSTOLIC BLOOD PRESSURE: 128 MMHG

## 2021-04-29 DIAGNOSIS — E11.9 CONTROLLED TYPE 2 DIABETES MELLITUS WITHOUT COMPLICATION, WITH LONG-TERM CURRENT USE OF INSULIN (HCC): Primary | ICD-10-CM

## 2021-04-29 DIAGNOSIS — Z79.4 CONTROLLED TYPE 2 DIABETES MELLITUS WITHOUT COMPLICATION, WITH LONG-TERM CURRENT USE OF INSULIN (HCC): Primary | ICD-10-CM

## 2021-04-29 DIAGNOSIS — B35.1 DERMATOPHYTOSIS OF NAIL: ICD-10-CM

## 2021-04-29 PROCEDURE — 11720 DEBRIDE NAIL 1-5: CPT | Performed by: PODIATRIST

## 2021-04-29 PROCEDURE — 99999 PR OFFICE/OUTPT VISIT,PROCEDURE ONLY: CPT | Performed by: PODIATRIST

## 2021-04-29 RX ORDER — LOXAPINE SUCCINATE 10 MG/1
TABLET ORAL
COMMUNITY
Start: 2021-01-21

## 2021-04-29 NOTE — PROGRESS NOTES
Foot Care Worksheet  PCP: DESIREE Montiel - CNP  Last visit:  1 / 22 / 2021 per patient      Nail description:  Thick , Yellow , Crumbly , Marked limitation of ambulation     Pain resulting from thickened and dystrophy of nail plate No    Nails involved  Right   none  (T5-T9)  Left     2  (TA-T4)    Q7 1 Class A Finding - Non traumatic amputation of foot No    Q8 2 Class B Findings - Absent DP pulse No, Absent PT pulse No, Advanced tropic changes (3 required) Yes    Decrease hair growth Yes, Nail changes/thickening Yes, Pigmented changes/discoloration Yes, Skin texture (thin, shiny) No, Skin color (rubor/redness) No    Q9 1 Class B and 2 Class C Findings  Claudication No, Temperature change Yes, Paresthesia No, Burning No, Edema Yes

## 2021-04-29 NOTE — PROGRESS NOTES
Lab notified of blood draw   Subjective:  Patient presents to [facility] today for routine foot care. Patient denies any new problems with their feet. Allergies   Allergen Reactions    Bicillin [Penicillin G Benzathine] Shortness Of Breath    Ziprasidone Other (See Comments)     \"I can't sit still\"  Pt could not sit still, hyper         Past Medical History:   Diagnosis Date    Depression     Diabetes mellitus ([address]     Thyroid disease     Hypothyroid       Prior to Admission medications    Medication Sig Start Date End Date Taking?  Authorizing Provider   loxapine (LOXITANE) 10 MG capsule Take by mouth 1/21/21  Yes Historical Provider, MD   loxapine (LOXITANE) 10 MG capsule  1/22/21  Yes Historical Provider, MD   atorvastatin (LIPITOR) 20 MG tablet Take 20 mg by mouth daily  11/12/19  Yes Historical Provider, MD   glimepiride (AMARYL) 2 MG tablet Take 2 mg by mouth every morning (before breakfast)  11/8/19  Yes Historical Provider, MD   lisinopril (PRINIVIL;ZESTRIL) 2.5 MG tablet 2.5 mg daily  11/8/19  Yes Historical Provider, MD   levothyroxine (SYNTHROID) 25 MCG tablet 25 mcg Daily  11/8/19  Yes Historical Provider, MD   Insulin Glargine-Lixisenatide (SOLIQUA) 100-33 UNT-MCG/ML SOPN Inject 10 Units into the skin daily  Patient taking differently: Inject 40 Units into the skin daily  9/23/19  Yes Paty Augustin MD   metoprolol tartrate (LOPRESSOR) 25 MG tablet Take 1 tablet by mouth 2 times daily 9/23/19  Yes Paty Augustin MD   metFORMIN (GLUCOPHAGE) 1000 MG tablet Take 1 tablet by mouth 2 times daily (with meals) 9/23/19  Yes Paty Augustin MD   lactulose (CHRONULAC) 10 GM/15ML solution Take 15 mLs by mouth 3 times daily 8/29/19  Yes Larry Pike MD   melatonin 3 MG TABS tablet Take 5 mg by mouth nightly as needed   Yes Historical Provider, MD   diphenhydrAMINE (BENADRYL) 25 MG capsule Take 2 capsules by mouth every 6 hours as needed for Itching 11/4/18  Yes Sweetie Vasquez MD       Social History     Tobacco Use   

## 2021-05-27 ENCOUNTER — HOSPITAL ENCOUNTER (OUTPATIENT)
Dept: MAMMOGRAPHY | Age: 61
Discharge: HOME | End: 2021-05-29
Payer: COMMERCIAL

## 2021-05-27 DIAGNOSIS — Z12.31 ENCOUNTER FOR SCREENING MAMMOGRAM FOR MALIGNANT NEOPLASM OF BREAST: ICD-10-CM

## 2021-05-27 PROCEDURE — 77063 BREAST TOMOSYNTHESIS BI: CPT

## 2021-06-18 ENCOUNTER — HOSPITAL ENCOUNTER (OUTPATIENT)
Age: 61
Setting detail: SPECIMEN
Discharge: HOME | End: 2021-06-18
Payer: COMMERCIAL

## 2021-06-19 LAB
CULTURE: NO GROWTH
Lab: NORMAL
SPECIMEN DESCRIPTION: NORMAL

## 2021-07-29 ENCOUNTER — OFFICE VISIT (OUTPATIENT)
Dept: PODIATRY | Age: 61
End: 2021-07-29
Payer: COMMERCIAL

## 2021-07-29 VITALS
HEIGHT: 66 IN | BODY MASS INDEX: 30.05 KG/M2 | SYSTOLIC BLOOD PRESSURE: 122 MMHG | WEIGHT: 187 LBS | HEART RATE: 88 BPM | DIASTOLIC BLOOD PRESSURE: 74 MMHG

## 2021-07-29 DIAGNOSIS — Z79.4 CONTROLLED TYPE 2 DIABETES MELLITUS WITHOUT COMPLICATION, WITH LONG-TERM CURRENT USE OF INSULIN (HCC): Primary | ICD-10-CM

## 2021-07-29 DIAGNOSIS — E11.9 CONTROLLED TYPE 2 DIABETES MELLITUS WITHOUT COMPLICATION, WITH LONG-TERM CURRENT USE OF INSULIN (HCC): Primary | ICD-10-CM

## 2021-07-29 DIAGNOSIS — B35.1 DERMATOPHYTOSIS OF NAIL: ICD-10-CM

## 2021-07-29 PROCEDURE — 11720 DEBRIDE NAIL 1-5: CPT | Performed by: PODIATRIST

## 2021-07-29 NOTE — PROGRESS NOTES
Subjective:  Patient presents to [facility] today for routine foot care. Patient denies any new problems with their feet. Allergies   Allergen Reactions    Bicillin [Penicillin G Benzathine] Shortness Of Breath    Ziprasidone Other (See Comments)     \"I can't sit still\"  Pt could not sit still, hyper         Past Medical History:   Diagnosis Date    Depression     Diabetes mellitus ([address]     Thyroid disease     Hypothyroid       Prior to Admission medications    Medication Sig Start Date End Date Taking?  Authorizing Provider   loxapine (LOXITANE) 10 MG capsule Take by mouth 1/21/21  Yes Historical Provider, MD   loxapine (LOXITANE) 10 MG capsule  1/22/21  Yes Historical Provider, MD   atorvastatin (LIPITOR) 20 MG tablet Take 20 mg by mouth daily  11/12/19  Yes Historical Provider, MD   glimepiride (AMARYL) 2 MG tablet Take 2 mg by mouth every morning (before breakfast)  11/8/19  Yes Historical Provider, MD   lisinopril (PRINIVIL;ZESTRIL) 2.5 MG tablet 2.5 mg daily  11/8/19  Yes Historical Provider, MD   levothyroxine (SYNTHROID) 25 MCG tablet 25 mcg Daily  11/8/19  Yes Historical Provider, MD   Insulin Glargine-Lixisenatide (SOLIQUA) 100-33 UNT-MCG/ML SOPN Inject 10 Units into the skin daily  Patient taking differently: Inject 40 Units into the skin daily  9/23/19  Yes Rabia Kam MD   metoprolol tartrate (LOPRESSOR) 25 MG tablet Take 1 tablet by mouth 2 times daily 9/23/19  Yes Rabia Kam MD   metFORMIN (GLUCOPHAGE) 1000 MG tablet Take 1 tablet by mouth 2 times daily (with meals) 9/23/19  Yes Rabia Kam MD   lactulose (CHRONULAC) 10 GM/15ML solution Take 15 mLs by mouth 3 times daily 8/29/19  Yes Melissa Russ MD   melatonin 3 MG TABS tablet Take 5 mg by mouth nightly as needed   Yes Historical Provider, MD   diphenhydrAMINE (BENADRYL) 25 MG capsule Take 2 capsules by mouth every 6 hours as needed for Itching 11/4/18  Yes Janelle Vazquez MD       Social History     Tobacco Use    Smoking status: Former Smoker     Packs/day: 1.00     Years: 40.00     Pack years: 40.00     Types: Cigarettes     Quit date: 3/6/2019     Years since quittin.4    Smokeless tobacco: Never Used    Tobacco comment: Babry Rossi RRT 19   Substance Use Topics    Alcohol use: No     ROS: All 14 ROS systems reviewed and pertinent positives noted above, all others negative. Objective:  Vascular: DP and PT pulses palpable 2/4, bilateral.  CFT <3 seconds, bilateral.  Hair growth present to the level of the digits, bilateral.  Edema absent, bilateral.  Varicosities absent, bilateral. Erythema absent, bilateral. Distal Rubor absent bilateral.  Temperature within normal limits bilateral. Hyperpigmentation absent bilateral. No atrophic skin. Neurological: Sensation intact to light touch to level of digits, bilateral.  Protective sensation intact 10/10 sites via 5.07/10g [place]-Rylan Monofilament, bilateral.  negative Tinel's, bilateral.  negative Valleix sign, bilateral.  Vibratory intact bilateral.  Reflexes Decreased bilateral.  Paresthesias negative. Dysthesias negative. Sharp/dull intact bilateral.    Integument:  Nails left 2 and right none thickened, dystrophic and crumbly, discolored with subungual debris. Hyperkeratotic tissue is absent. Assessment:   Diagnosis Orders   1. Controlled type 2 diabetes mellitus without complication, with long-term current use of insulin ([address]     2. Dermatophytosis of nail         Plan:  Nails as mentioned above debrided in length and thickness. Patient advised OTC methods for treatment of the mycotic nails. Patient will follow up in 10 weeks.

## 2021-07-29 NOTE — PROGRESS NOTES
Foot Care Worksheet  PCP: DESIREE Vallejo - CNP  Last visit:  5 / 22 / 2021 per patient      Nail description:  Thick , Yellow , Crumbly , Marked limitation of ambulation     Pain resulting from thickened and dystrophy of nail plate No    Nails involved  Right   none  (T5-T9)  Left     2  (TA-T4)    Q7 1 Class A Finding - Non traumatic amputation of foot No    Q8 2 Class B Findings - Absent DP pulse No, Absent PT pulse No, Advanced tropic changes (3 required) Yes    Decrease hair growth Yes, Nail changes/thickening Yes, Pigmented changes/discoloration Yes, Skin texture (thin, shiny) No, Skin color (rubor/redness) No    Q9 1 Class B and 2 Class C Findings  Claudication No, Temperature change Yes, Paresthesia No, Burning No, Edema Yes

## 2021-08-20 ENCOUNTER — HOSPITAL ENCOUNTER (OUTPATIENT)
Dept: GENERAL RADIOLOGY | Age: 61
Discharge: HOME | End: 2021-08-22
Payer: COMMERCIAL

## 2021-08-20 ENCOUNTER — HOSPITAL ENCOUNTER (OUTPATIENT)
Dept: LAB | Age: 61
Discharge: HOME | End: 2021-08-20
Payer: COMMERCIAL

## 2021-08-20 DIAGNOSIS — M79.605 PAIN IN LEFT LEG: ICD-10-CM

## 2021-08-20 LAB
ABSOLUTE EOS #: 0.22 K/UL (ref 0–0.44)
ABSOLUTE IMMATURE GRANULOCYTE: 0.05 K/UL (ref 0–0.3)
ABSOLUTE LYMPH #: 2.99 K/UL (ref 1.1–3.7)
ABSOLUTE MONO #: 0.59 K/UL (ref 0.1–1.2)
ALBUMIN SERPL-MCNC: 4.3 G/DL (ref 3.5–5.2)
ALBUMIN/GLOBULIN RATIO: 1.3 (ref 1–2.5)
ALP BLD-CCNC: 130 U/L (ref 35–104)
ALT SERPL-CCNC: 24 U/L (ref 5–33)
ANION GAP SERPL CALCULATED.3IONS-SCNC: 15 MMOL/L (ref 9–17)
AST SERPL-CCNC: 17 U/L
BASOPHILS # BLD: 1 % (ref 0–2)
BASOPHILS ABSOLUTE: 0.07 K/UL (ref 0–0.2)
BILIRUB SERPL-MCNC: 0.27 MG/DL (ref 0.3–1.2)
BUN BLDV-MCNC: 14 MG/DL (ref 8–23)
BUN/CREAT BLD: 18 (ref 9–20)
CALCIUM SERPL-MCNC: 9.9 MG/DL (ref 8.6–10.4)
CHLORIDE BLD-SCNC: 102 MMOL/L (ref 98–107)
CHOLESTEROL/HDL RATIO: 3.9
CHOLESTEROL: 135 MG/DL
CO2: 26 MMOL/L (ref 20–31)
CREAT SERPL-MCNC: 0.77 MG/DL (ref 0.5–0.9)
DIFFERENTIAL TYPE: ABNORMAL
EOSINOPHILS RELATIVE PERCENT: 2 % (ref 1–4)
GFR AFRICAN AMERICAN: >60 ML/MIN
GFR NON-AFRICAN AMERICAN: >60 ML/MIN
GFR SERPL CREATININE-BSD FRML MDRD: ABNORMAL ML/MIN/{1.73_M2}
GFR SERPL CREATININE-BSD FRML MDRD: ABNORMAL ML/MIN/{1.73_M2}
GLUCOSE BLD-MCNC: 212 MG/DL (ref 70–99)
HCT VFR BLD CALC: 43.3 % (ref 36.3–47.1)
HDLC SERPL-MCNC: 35 MG/DL
HEMOGLOBIN: 14.3 G/DL (ref 11.9–15.1)
IMMATURE GRANULOCYTES: 1 %
LDL CHOLESTEROL: 65 MG/DL (ref 0–130)
LYMPHOCYTES # BLD: 28 % (ref 24–43)
MCH RBC QN AUTO: 29.4 PG (ref 25.2–33.5)
MCHC RBC AUTO-ENTMCNC: 33 G/DL (ref 25.2–33.5)
MCV RBC AUTO: 88.9 FL (ref 82.6–102.9)
MONOCYTES # BLD: 5 % (ref 3–12)
NRBC AUTOMATED: 0 PER 100 WBC
PDW BLD-RTO: 13.7 % (ref 11.8–14.4)
PLATELET # BLD: 246 K/UL (ref 138–453)
PLATELET ESTIMATE: ABNORMAL
PMV BLD AUTO: 9.7 FL (ref 8.1–13.5)
POTASSIUM SERPL-SCNC: 4.3 MMOL/L (ref 3.7–5.3)
RBC # BLD: 4.87 M/UL (ref 3.95–5.11)
RBC # BLD: ABNORMAL 10*6/UL
SEG NEUTROPHILS: 63 % (ref 36–65)
SEGMENTED NEUTROPHILS ABSOLUTE COUNT: 6.97 K/UL (ref 1.5–8.1)
SODIUM BLD-SCNC: 143 MMOL/L (ref 135–144)
THYROXINE, FREE: 1.38 NG/DL (ref 0.93–1.7)
TOTAL PROTEIN: 7.6 G/DL (ref 6.4–8.3)
TRIGL SERPL-MCNC: 177 MG/DL
TSH SERPL DL<=0.05 MIU/L-ACNC: 5.98 MIU/L (ref 0.3–5)
VLDLC SERPL CALC-MCNC: ABNORMAL MG/DL (ref 1–30)
WBC # BLD: 10.9 K/UL (ref 3.5–11.3)
WBC # BLD: ABNORMAL 10*3/UL

## 2021-08-20 PROCEDURE — 85025 COMPLETE CBC W/AUTO DIFF WBC: CPT

## 2021-08-20 PROCEDURE — 36415 COLL VENOUS BLD VENIPUNCTURE: CPT

## 2021-08-20 PROCEDURE — 80053 COMPREHEN METABOLIC PANEL: CPT

## 2021-08-20 PROCEDURE — 73590 X-RAY EXAM OF LOWER LEG: CPT

## 2021-08-20 PROCEDURE — 84443 ASSAY THYROID STIM HORMONE: CPT

## 2021-08-20 PROCEDURE — 84439 ASSAY OF FREE THYROXINE: CPT

## 2021-08-20 PROCEDURE — 80061 LIPID PANEL: CPT

## 2021-09-16 ENCOUNTER — HOSPITAL ENCOUNTER (OUTPATIENT)
Dept: ULTRASOUND IMAGING | Age: 61
Discharge: HOME | End: 2021-09-18
Payer: MEDICARE

## 2021-09-16 DIAGNOSIS — R94.5 ABNORMAL FINDING ON LIVER FUNCTION: ICD-10-CM

## 2021-09-16 PROCEDURE — 76705 ECHO EXAM OF ABDOMEN: CPT

## 2021-09-24 ENCOUNTER — HOSPITAL ENCOUNTER (OUTPATIENT)
Dept: LAB | Age: 61
Discharge: HOME | End: 2021-09-24
Payer: COMMERCIAL

## 2021-09-24 LAB — TSH SERPL DL<=0.05 MIU/L-ACNC: 2.47 MIU/L (ref 0.3–5)

## 2021-09-24 PROCEDURE — 36415 COLL VENOUS BLD VENIPUNCTURE: CPT

## 2021-09-24 PROCEDURE — 84443 ASSAY THYROID STIM HORMONE: CPT

## 2021-10-07 ENCOUNTER — OFFICE VISIT (OUTPATIENT)
Dept: PODIATRY | Age: 61
End: 2021-10-07
Payer: MEDICARE

## 2021-10-07 VITALS
WEIGHT: 193 LBS | SYSTOLIC BLOOD PRESSURE: 124 MMHG | HEART RATE: 72 BPM | DIASTOLIC BLOOD PRESSURE: 72 MMHG | RESPIRATION RATE: 20 BRPM | BODY MASS INDEX: 31.63 KG/M2

## 2021-10-07 DIAGNOSIS — E11.9 CONTROLLED TYPE 2 DIABETES MELLITUS WITHOUT COMPLICATION, WITH LONG-TERM CURRENT USE OF INSULIN (HCC): Primary | ICD-10-CM

## 2021-10-07 DIAGNOSIS — B35.1 DERMATOPHYTOSIS OF NAIL: ICD-10-CM

## 2021-10-07 DIAGNOSIS — Z79.4 CONTROLLED TYPE 2 DIABETES MELLITUS WITHOUT COMPLICATION, WITH LONG-TERM CURRENT USE OF INSULIN (HCC): Primary | ICD-10-CM

## 2021-10-07 PROCEDURE — 99999 PR OFFICE/OUTPT VISIT,PROCEDURE ONLY: CPT | Performed by: PODIATRIST

## 2021-10-07 PROCEDURE — 11720 DEBRIDE NAIL 1-5: CPT | Performed by: PODIATRIST

## 2021-10-07 NOTE — PROGRESS NOTES
Foot Care Worksheet  PCP: DESIREE Higgins - CNP  Last visit:  10 / 01 / 2021 per patient      Nail description:  Thick , Yellow , Crumbly , Marked limitation of ambulation     Pain resulting from thickened and dystrophy of nail plate No    Nails involved  Right   none  (T5-T9)  Left     2  (TA-T4)    Q7 1 Class A Finding - Non traumatic amputation of foot No    Q8 2 Class B Findings - Absent DP pulse No, Absent PT pulse No, Advanced tropic changes (3 required) Yes    Decrease hair growth Yes, Nail changes/thickening Yes, Pigmented changes/discoloration Yes, Skin texture (thin, shiny) No, Skin color (rubor/redness) No    Q9 1 Class B and 2 Class C Findings  Claudication No, Temperature change Yes, Paresthesia No, Burning No, Edema Yes
Gerri Barraza MD       Social History     Tobacco Use    Smoking status: Former Smoker     Packs/day: 1.00     Years: 40.00     Pack years: 40.00     Types: Cigarettes     Quit date: 3/6/2019     Years since quittin.5    Smokeless tobacco: Never Used    Tobacco comment: Royal Egan RRT 19   Substance Use Topics    Alcohol use: No     ROS: All 14 ROS systems reviewed and pertinent positives noted above, all others negative. Objective:  Vascular: DP and PT pulses palpable 2/4, bilateral.  CFT <3 seconds, bilateral.  Hair growth present to the level of the digits, bilateral.  Edema absent, bilateral.  Varicosities absent, bilateral. Erythema absent, bilateral. Distal Rubor absent bilateral.  Temperature within normal limits bilateral. Hyperpigmentation absent bilateral. No atrophic skin. Neurological: Sensation intact to light touch to level of digits, bilateral.  Protective sensation intact 10/10 sites via 5.07/10g [place]-Rylan Monofilament, bilateral.  negative Tinel's, bilateral.  negative Valleix sign, bilateral.  Vibratory intact bilateral.  Reflexes Decreased bilateral.  Paresthesias negative. Dysthesias negative. Sharp/dull intact bilateral.    Integument:  Nails left 2 and right none thickened, dystrophic and crumbly, discolored with subungual debris. Hyperkeratotic tissue is absent. Assessment:   Diagnosis Orders   1. Controlled type 2 diabetes mellitus without complication, with long-term current use of insulin ([address]     2. Dermatophytosis of nail         Plan:  Nails as mentioned above debrided in length and thickness. Patient advised OTC methods for treatment of the mycotic nails. Patient will follow up in 10 weeks.

## 2021-12-16 ENCOUNTER — OFFICE VISIT (OUTPATIENT)
Dept: PODIATRY | Age: 61
End: 2021-12-16
Payer: MEDICARE

## 2021-12-16 VITALS
WEIGHT: 197 LBS | SYSTOLIC BLOOD PRESSURE: 120 MMHG | HEIGHT: 66 IN | DIASTOLIC BLOOD PRESSURE: 72 MMHG | HEART RATE: 80 BPM | BODY MASS INDEX: 31.66 KG/M2

## 2021-12-16 DIAGNOSIS — E11.9 CONTROLLED TYPE 2 DIABETES MELLITUS WITHOUT COMPLICATION, WITH LONG-TERM CURRENT USE OF INSULIN (HCC): Primary | ICD-10-CM

## 2021-12-16 DIAGNOSIS — B35.1 DERMATOPHYTOSIS OF NAIL: ICD-10-CM

## 2021-12-16 DIAGNOSIS — Z79.4 CONTROLLED TYPE 2 DIABETES MELLITUS WITHOUT COMPLICATION, WITH LONG-TERM CURRENT USE OF INSULIN (HCC): Primary | ICD-10-CM

## 2021-12-16 PROCEDURE — 99999 PR OFFICE/OUTPT VISIT,PROCEDURE ONLY: CPT | Performed by: PODIATRIST

## 2021-12-16 PROCEDURE — 11720 DEBRIDE NAIL 1-5: CPT | Performed by: PODIATRIST

## 2021-12-16 NOTE — PROGRESS NOTES
Subjective:  Patient presents to [facility] today for routine foot care. Patient denies any new problems with their feet. Allergies   Allergen Reactions    Bicillin [Penicillin G Benzathine] Shortness Of Breath    Penicillins      Other reaction(s): PENICILLINS    Ziprasidone Other (See Comments)     \"I can't sit still\"  Pt could not sit still, hyper         Past Medical History:   Diagnosis Date    Depression     [address] ([facility] [address] [facility]     Thyroid disease     Hypothyroid       Prior to Admission medications    Medication Sig Start Date End Date Taking?  Authorizing Provider   loxapine (LOXITANE) 10 MG capsule Take by mouth 1/21/21  Yes Historical Provider, MD   loxapine (LOXITANE) 10 MG capsule  1/22/21  Yes Historical Provider, MD   atorvastatin (LIPITOR) 20 MG tablet Take 20 mg by mouth daily  11/12/19  Yes Historical Provider, MD   glimepiride (AMARYL) 2 MG tablet Take 2 mg by mouth every morning (before breakfast)  11/8/19  Yes Historical Provider, MD   lisinopril (PRINIVIL;ZESTRIL) 2.5 MG tablet 2.5 mg daily  11/8/19  Yes Historical Provider, MD   levothyroxine (SYNTHROID) 25 MCG tablet 25 mcg Daily  11/8/19  Yes Historical Provider, MD   Insulin Glargine-Lixisenatide (SOLIQUA) 100-33 UNT-MCG/ML SOPN Inject 10 Units into the skin daily  Patient taking differently: Inject 40 Units into the skin daily  9/23/19  Yes Brian Browne MD   metoprolol tartrate (LOPRESSOR) 25 MG tablet Take 1 tablet by mouth 2 times daily 9/23/19  Yes Brian Browne MD   metFORMIN (GLUCOPHAGE) 1000 MG tablet Take 1 tablet by mouth 2 times daily (with meals) 9/23/19  Yes Brian Browne MD   lactulose (CHRONULAC) 10 GM/15ML solution Take 15 mLs by mouth 3 times daily 8/29/19  Yes Alycia Kulkarni MD   melatonin 3 MG TABS tablet Take 5 mg by mouth nightly as needed   Yes Historical Provider, MD   diphenhydrAMINE (BENADRYL) 25 MG capsule Take 2 capsules by mouth every 6 hours as needed for Itching 11/4/18  Yes Scott Jeffers Irlanda Reagan MD       Social History     Tobacco Use    Smoking status: Former Smoker     Packs/day: 1.00     Years: 40.00     Pack years: 40.00     Types: Cigarettes     Quit date: 3/6/2019     Years since quittin.7    Smokeless tobacco: Never Used    Tobacco comment: Shaneka Antoine RRT 19   Substance Use Topics    Alcohol use: No     ROS: All 14 ROS systems reviewed and pertinent positives noted above, all others negative. Objective:  Vascular: DP and PT pulses palpable 2/4, bilateral.  CFT <3 seconds, bilateral.  Hair growth present to the level of the digits, bilateral.  Edema absent, bilateral.  Varicosities absent, bilateral. Erythema absent, bilateral. Distal Rubor absent bilateral.  Temperature within normal limits bilateral. Hyperpigmentation absent bilateral. No atrophic skin. Neurological: Sensation intact to light touch to level of digits, bilateral.  Protective sensation intact 10/10 sites via 5.07/10g [place]-Rylan Monofilament, bilateral.  negative Tinel's, bilateral.  negative Valleix sign, bilateral.  Vibratory intact bilateral.  Reflexes Decreased bilateral.  Paresthesias negative. Dysthesias negative. Sharp/dull intact bilateral.    Integument:  Nails left 1, 2 and right none thickened, dystrophic and crumbly, discolored with subungual debris. Hyperkeratotic tissue is absent. Assessment:   Diagnosis Orders   1. Controlled type 2 diabetes mellitus without complication, with long-term current use of insulin ([address]     2. Dermatophytosis of nail         Plan:  Nails as mentioned above debrided in length and thickness. Patient advised OTC methods for treatment of the mycotic nails. Patient will follow up in 10 weeks.

## 2021-12-16 NOTE — PROGRESS NOTES
Foot Care Worksheet  PCP: DESIREE Sales - CNP  Last visit:  12/9/2021      Nail description:  Thick , Yellow , Crumbly , Marked limitation of ambulation     Pain resulting from thickened and dystrophy of nail plate No    Nails involved  Right   none  (T5-T9)  Left    1, 2  (TA-T4)    Q7 1 Class A Finding - Non traumatic amputation of foot No    Q8 2 Class B Findings - Absent DP pulse No, Absent PT pulse No, Advanced tropic changes (3 required) Yes    Decrease hair growth Yes, Nail changes/thickening Yes, Pigmented changes/discoloration Yes, Skin texture (thin, shiny) No, Skin color (rubor/redness) No    Q9 1 Class B and 2 Class C Findings  Claudication No, Temperature change Yes, Paresthesia No, Burning No, Edema Yes

## 2022-01-27 ENCOUNTER — HOSPITAL ENCOUNTER (OUTPATIENT)
Dept: GENERAL RADIOLOGY | Age: 62
Discharge: HOME | End: 2022-01-29
Payer: MEDICARE

## 2022-01-27 DIAGNOSIS — M79.641 PAIN IN RIGHT HAND: ICD-10-CM

## 2022-01-27 PROCEDURE — 73130 X-RAY EXAM OF HAND: CPT

## 2022-02-24 ENCOUNTER — OFFICE VISIT (OUTPATIENT)
Dept: PODIATRY | Age: 62
End: 2022-02-24
Payer: MEDICARE

## 2022-02-24 VITALS
HEART RATE: 78 BPM | WEIGHT: 204 LBS | DIASTOLIC BLOOD PRESSURE: 70 MMHG | HEIGHT: 66 IN | SYSTOLIC BLOOD PRESSURE: 130 MMHG | BODY MASS INDEX: 32.78 KG/M2

## 2022-02-24 DIAGNOSIS — B35.1 DERMATOPHYTOSIS OF NAIL: ICD-10-CM

## 2022-02-24 DIAGNOSIS — E11.9 CONTROLLED TYPE 2 DIABETES MELLITUS WITHOUT COMPLICATION, WITH LONG-TERM CURRENT USE OF INSULIN (HCC): Primary | ICD-10-CM

## 2022-02-24 DIAGNOSIS — Z79.4 CONTROLLED TYPE 2 DIABETES MELLITUS WITHOUT COMPLICATION, WITH LONG-TERM CURRENT USE OF INSULIN (HCC): Primary | ICD-10-CM

## 2022-02-24 PROCEDURE — 99999 PR OFFICE/OUTPT VISIT,PROCEDURE ONLY: CPT | Performed by: PODIATRIST

## 2022-02-24 PROCEDURE — 11720 DEBRIDE NAIL 1-5: CPT | Performed by: PODIATRIST

## 2022-02-24 NOTE — PROGRESS NOTES
Foot Care Worksheet  PCP: DESIREE Freeman - CNP  Last visit:  12/9/2021      Nail description:  Thick , Yellow , Crumbly , Marked limitation of ambulation     Pain resulting from thickened and dystrophy of nail plate No    Nails involved  Right   none  (T5-T9)  Left    1, 2  (TA-T4)    Q7 1 Class A Finding - Non traumatic amputation of foot No    Q8 2 Class B Findings - Absent DP pulse No, Absent PT pulse No, Advanced tropic changes (3 required) Yes    Decrease hair growth Yes, Nail changes/thickening Yes, Pigmented changes/discoloration Yes, Skin texture (thin, shiny) No, Skin color (rubor/redness) No    Q9 1 Class B and 2 Class C Findings  Claudication No, Temperature change Yes, Paresthesia No, Burning No, Edema Yes

## 2022-03-30 ENCOUNTER — HOSPITAL ENCOUNTER (OUTPATIENT)
Dept: GENERAL RADIOLOGY | Age: 62
Discharge: HOME | End: 2022-04-01
Payer: MEDICARE

## 2022-03-30 DIAGNOSIS — M77.31 CALCANEAL SPUR OF RIGHT FOOT: ICD-10-CM

## 2022-03-30 PROCEDURE — 73650 X-RAY EXAM OF HEEL: CPT

## 2022-05-05 ENCOUNTER — OFFICE VISIT (OUTPATIENT)
Dept: PODIATRY | Age: 62
End: 2022-05-05
Payer: MEDICARE

## 2022-05-05 VITALS
DIASTOLIC BLOOD PRESSURE: 74 MMHG | HEIGHT: 66 IN | BODY MASS INDEX: 33.59 KG/M2 | SYSTOLIC BLOOD PRESSURE: 110 MMHG | WEIGHT: 209 LBS | HEART RATE: 80 BPM

## 2022-05-05 DIAGNOSIS — E11.9 CONTROLLED TYPE 2 DIABETES MELLITUS WITHOUT COMPLICATION, WITH LONG-TERM CURRENT USE OF INSULIN (HCC): Primary | ICD-10-CM

## 2022-05-05 DIAGNOSIS — Z79.4 CONTROLLED TYPE 2 DIABETES MELLITUS WITHOUT COMPLICATION, WITH LONG-TERM CURRENT USE OF INSULIN (HCC): Primary | ICD-10-CM

## 2022-05-05 DIAGNOSIS — B35.1 DERMATOPHYTOSIS OF NAIL: ICD-10-CM

## 2022-05-05 PROCEDURE — 11720 DEBRIDE NAIL 1-5: CPT | Performed by: PODIATRIST

## 2022-05-05 PROCEDURE — 99999 PR OFFICE/OUTPT VISIT,PROCEDURE ONLY: CPT | Performed by: PODIATRIST

## 2022-05-05 NOTE — PROGRESS NOTES
Foot Care Worksheet  PCP: Rosalva Calvillo MD  Last visit: 4/8/22      Nail description:  Thick , Yellow , Crumbly , Marked limitation of ambulation     Pain resulting from thickened and dystrophy of nail plate No    Nails involved  Right   none  (T5-T9)  Left    1, 2  (TA-T4)    Q7 1 Class A Finding - Non traumatic amputation of foot No    Q8 2 Class B Findings - Absent DP pulse No, Absent PT pulse No, Advanced tropic changes (3 required) Yes    Decrease hair growth Yes, Nail changes/thickening Yes, Pigmented changes/discoloration Yes, Skin texture (thin, shiny) No, Skin color (rubor/redness) No    Q9 1 Class B and 2 Class C Findings  Claudication No, Temperature change Yes, Paresthesia No, Burning No, Edema Yes

## 2022-05-05 NOTE — PROGRESS NOTES
Subjective:  Patient presents to [facility] today for routine foot care. Patient denies any new problems with their feet. Allergies   Allergen Reactions    Bicillin [Penicillin G Benzathine] Shortness Of Breath    Penicillins      Other reaction(s): PENICILLINS    Ziprasidone Other (See Comments)     \"I can't sit still\"  Pt could not sit still, hyper         Past Medical History:   Diagnosis Date    Depression     [facility] [address] [address]     Thyroid disease     Hypothyroid       Prior to Admission medications    Medication Sig Start Date End Date Taking?  Authorizing Provider   loxapine (LOXITANE) 10 MG capsule Take by mouth 1/21/21  Yes Historical Provider, MD   loxapine (LOXITANE) 10 MG capsule  1/22/21  Yes Historical Provider, MD   atorvastatin (LIPITOR) 20 MG tablet Take 20 mg by mouth daily  11/12/19  Yes Historical Provider, MD   glimepiride (AMARYL) 2 MG tablet Take 2 mg by mouth every morning (before breakfast)  11/8/19  Yes Historical Provider, MD   lisinopril (PRINIVIL;ZESTRIL) 2.5 MG tablet 2.5 mg daily  11/8/19  Yes Historical Provider, MD   levothyroxine (SYNTHROID) 25 MCG tablet 25 mcg Daily  11/8/19  Yes Historical Provider, MD   Insulin Glargine-Lixisenatide (SOLIQUA) 100-33 UNT-MCG/ML SOPN Inject 10 Units into the skin daily  Patient taking differently: Inject 40 Units into the skin daily  9/23/19  Yes Rakan Pederson MD   metoprolol tartrate (LOPRESSOR) 25 MG tablet Take 1 tablet by mouth 2 times daily 9/23/19  Yes Rakan Pederson MD   metFORMIN (GLUCOPHAGE) 1000 MG tablet Take 1 tablet by mouth 2 times daily (with meals) 9/23/19  Yes Rakan Pederson MD   lactulose (CHRONULAC) 10 GM/15ML solution Take 15 mLs by mouth 3 times daily 8/29/19  Yes Rocio Chan MD   melatonin 3 MG TABS tablet Take 5 mg by mouth nightly as needed   Yes Historical Provider, MD   diphenhydrAMINE (BENADRYL) 25 MG capsule Take 2 capsules by mouth every 6 hours as needed for Itching 11/4/18  Yes Georganbriana Bitter Geovany Dove MD       Social History     Tobacco Use    Smoking status: Former Smoker     Packs/day: 1.00     Years: 40.00     Pack years: 40.00     Types: Cigarettes     Quit date: 3/6/2019     Years since quitting: 3.1    Smokeless tobacco: Never Used    Tobacco comment: Saeid Martinez RRT 8/6/19   Substance Use Topics    Alcohol use: No     ROS: All 14 ROS systems reviewed and pertinent positives noted above, all others negative. Objective:  Vascular: DP and PT pulses palpable 2/4, bilateral.  CFT <3 seconds, bilateral.  Hair growth present to the level of the digits, bilateral.  Edema absent, bilateral.  Varicosities absent, bilateral. Erythema absent, bilateral. Distal Rubor absent bilateral.  Temperature within normal limits bilateral. Hyperpigmentation absent bilateral. No atrophic skin. Neurological: Sensation intact to light touch to level of digits, bilateral.  Protective sensation intact 10/10 sites via 5.07/10g [place]-Rylan Monofilament, bilateral.  negative Tinel's, bilateral.  negative Valleix sign, bilateral.  Vibratory intact bilateral.  Reflexes Decreased bilateral.  Paresthesias negative. Dysthesias negative. Sharp/dull intact bilateral.    Integument:  Nails left 1, 2 and right none thickened, dystrophic and crumbly, discolored with subungual debris. Hyperkeratotic tissue is absent. Assessment:   Diagnosis Orders   1. Controlled type 2 diabetes mellitus without complication, with long-term current use of insulin ([address]     2. Dermatophytosis of nail         Plan:  Nails as mentioned above debrided in length and thickness. Patient advised OTC methods for treatment of the mycotic nails. Patient will follow up in 10 weeks.

## 2022-05-17 ENCOUNTER — OFFICE VISIT (OUTPATIENT)
Dept: PODIATRY | Age: 62
End: 2022-05-17
Payer: MEDICARE

## 2022-05-17 VITALS
WEIGHT: 209 LBS | BODY MASS INDEX: 33.59 KG/M2 | SYSTOLIC BLOOD PRESSURE: 118 MMHG | HEART RATE: 84 BPM | DIASTOLIC BLOOD PRESSURE: 74 MMHG | HEIGHT: 66 IN

## 2022-05-17 DIAGNOSIS — M79.671 PAIN OF RIGHT HEEL: ICD-10-CM

## 2022-05-17 DIAGNOSIS — M72.2 PLANTAR FASCIITIS, RIGHT: Primary | ICD-10-CM

## 2022-05-17 PROCEDURE — 99213 OFFICE O/P EST LOW 20 MIN: CPT | Performed by: PODIATRIST

## 2022-05-17 PROCEDURE — 3017F COLORECTAL CA SCREEN DOC REV: CPT | Performed by: PODIATRIST

## 2022-05-17 PROCEDURE — 1036F TOBACCO NON-USER: CPT | Performed by: PODIATRIST

## 2022-05-17 PROCEDURE — G8427 DOCREV CUR MEDS BY ELIG CLIN: HCPCS | Performed by: PODIATRIST

## 2022-05-17 PROCEDURE — 99214 OFFICE O/P EST MOD 30 MIN: CPT | Performed by: PODIATRIST

## 2022-05-17 PROCEDURE — G8417 CALC BMI ABV UP PARAM F/U: HCPCS | Performed by: PODIATRIST

## 2022-05-17 RX ORDER — METHYLPREDNISOLONE 4 MG/1
TABLET ORAL
Qty: 1 KIT | Refills: 0 | Status: SHIPPED | OUTPATIENT
Start: 2022-05-17 | End: 2022-07-14

## 2022-05-17 NOTE — PROGRESS NOTES
Subjective:    Destiny Schilling is a 64 y.o. female who presents to the office today complaining of Right heel pain. Symptoms began  month(s) ago. Patient relates pain is Present upon arising from bed in the morning and after periods of rest and then standing. The pain progresses throughout the day. Pain is rated 5 out of 10 and is described as moderate. Treatments prior to today's visit include: ice. Currently denies F/C/N/V. Allergies   Allergen Reactions    Bicillin [Penicillin G Benzathine] Shortness Of Breath    Penicillins      Other reaction(s): PENICILLINS    Ziprasidone Other (See Comments)     \"I can't sit still\"  Pt could not sit still, hyper         Past Medical History:   Diagnosis Date    Depression     Diabetes mellitus ([facility] [address]     Thyroid disease     Hypothyroid       Prior to Admission medications    Medication Sig Start Date End Date Taking? Authorizing Provider   methylPREDNISolone (MEDROL DOSEPACK) 4 MG tablet Take by mouth.  5/17/22  Yes Elen Peck DPM   loxapine Lena Caleb) 10 MG capsule Take by mouth 1/21/21  Yes Historical Provider, MD   loxapine (LOXITANE) 10 MG capsule  1/22/21  Yes Historical Provider, MD   atorvastatin (LIPITOR) 20 MG tablet Take 20 mg by mouth daily  11/12/19  Yes Historical Provider, MD   glimepiride (AMARYL) 2 MG tablet Take 2 mg by mouth every morning (before breakfast)  11/8/19  Yes Historical Provider, MD   lisinopril (PRINIVIL;ZESTRIL) 2.5 MG tablet 2.5 mg daily  11/8/19  Yes Historical Provider, MD   levothyroxine (SYNTHROID) 25 MCG tablet 25 mcg Daily  11/8/19  Yes Historical Provider, MD   Insulin Glargine-Lixisenatide (SOLIQUA) 100-33 UNT-MCG/ML SOPN Inject 10 Units into the skin daily  Patient taking differently: Inject 40 Units into the skin daily  9/23/19  Yes Alvaro Greenberg MD   metoprolol tartrate (LOPRESSOR) 25 MG tablet Take 1 tablet by mouth 2 times daily 9/23/19  Yes Alvaro Greenberg MD   metFORMIN (GLUCOPHAGE) 1000 MG tablet Take 1 tablet by mouth 2 times daily (with meals) 9/23/19  Yes aJ Aviles MD   lactulose [facility]) 10 GM/15ML solution Take 15 mLs by mouth 3 times daily 8/29/19  Yes Oleg Delgado MD   melatonin 3 MG TABS tablet Take 5 mg by mouth nightly as needed   Yes Historical Provider, MD   diphenhydrAMINE (BENADRYL) 25 MG capsule Take 2 capsules by mouth every 6 hours as needed for Itching 11/4/18  Yes Eliseo Travis MD       Past Surgical History:   Procedure Laterality Date    ABDOMEN SURGERY      CHOLECYSTECTOMY      COLONOSCOPY  8/12/2019    COLONOSCOPY FLEXIBLE W/ DECOMPRESSION performed by Bo Delgado MD at [facility]    COLONOSCOPY N/A 8/16/2019    COLONOSCOPY FLEXIBLE W/ DECOMPRESSION performed by [address], MD at [facility]    COLONOSCOPY N/A 10/19/2020    COLONOSCOPY, POLYPECTOMY, SNARE, AND TATTOOING OF SIGMOID COLON MASS SITE performed by Case Yousif DO at [place] [address]         Family History   Problem Relation Age of Onset    Diabetes Mother     Heart Disease Mother     Stroke Brother        Social History     Tobacco Use    Smoking status: Former Smoker     Packs/day: 1.00     Years: 40.00     Pack years: 40.00     Types: Cigarettes     Quit date: 3/6/2019     Years since quitting: 3.2    Smokeless tobacco: Never Used    Tobacco comment: Renetta Maria Teresa RRT 8/6/19   Substance Use Topics    Alcohol use: No       ROS: All 14 ROS systems reviewed and pertinent positives noted above, all others negative. Lower Extremity Physical Examination:     Vitals:   Vitals:    05/17/22 1518   BP: 118/74   Pulse: 84     General: AAO x 3 in NAD.      Vascular: DP and PT pulses palpable 2/4, bilateral.  CFT <3 seconds, bilateral.  Hair growth present to the level of the digits, bilateral.  Edema absent, bilateral.  Varicosities absent, bilateral. Erythema absent, bilateral. Distal Rubor absent bilateral.  Temperature within normal limits bilateral. Hyperpigmentation absent bilateral. No atrophic skin. Neurological: Sensation intact to light touch to level of digits, bilateral.  Protective sensation intact 10/10 sites via 5.07/10g [place]-Rylan Monofilament, bilateral.  negative Tinel's, bilateral.  negative Valleix sign, bilateral.  Vibratory intact bilateral.  Reflexes Decreased bilateral.  Paresthesias negative. Dysthesias negative. Sharp/dull intact bilateral.        Musculoskeletal: Muscle strength 5/5, Bilateral.  Pain present upon palpation of medial calcaneal tubercle, Right. within normal limits medial longitudinal arch, Bilateral.  Ankle ROM decreased,Bilateral.  1st MPJ ROM within normal limits, Bilateral.  Dorsally contracted digits absent digits 0, Bilateral. Other foot deformities none. Integument: Warm, dry, supple, bilateral.  Open lesion absent, bilateral.  Interdigital maceration absent to web spaces bilateral.  Nails within normal limits. Fissures absent, bilateral. Hyperkeratotic tissue is absent. Asessment: Patient is a 64 y.o. female with:    Diagnosis Orders   1. Plantar fasciitis, right     2. Pain of right heel         Plan: Patient examined and evaluated. Current condition and treatment options discussed in detail. Patient was given plantar fasciitis instruction sheet. Patient will start stretching, icing, anti-inflammatory, and arch supports in shoe gear 100% of the time WB. Patient will not go barefoot. Due to level of pain/deformity, it is in my medical opinion that patient will benefit from and is medically necessary for pre solitario orthotics at this time. Pt was given the option of pre fabricated insoles. Pt was advised on appropriate use and how they can help their condition. Pt should use these in shoe gear 100% of the time WB. Orders Placed This Encounter   Medications    methylPREDNISolone (MEDROL DOSEPACK) 4 MG tablet     Sig: Take by mouth.      Dispense:  1 kit     Refill:  0   Patient is low level medical decision making. Patient with acute on copy condition. Patient with is reviewed with her provider. Patient is low risk morbidity. X rays reviewed with the pt in detail. All questions answered. Contact office with any questions/problems/concerns. Return to office in 3week(s).

## 2022-07-14 ENCOUNTER — OFFICE VISIT (OUTPATIENT)
Dept: PODIATRY | Age: 62
End: 2022-07-14
Payer: MEDICARE

## 2022-07-14 VITALS
WEIGHT: 204 LBS | BODY MASS INDEX: 32.78 KG/M2 | HEIGHT: 66 IN | SYSTOLIC BLOOD PRESSURE: 124 MMHG | DIASTOLIC BLOOD PRESSURE: 80 MMHG | HEART RATE: 68 BPM

## 2022-07-14 DIAGNOSIS — B35.1 DERMATOPHYTOSIS OF NAIL: ICD-10-CM

## 2022-07-14 DIAGNOSIS — Z79.4 CONTROLLED TYPE 2 DIABETES MELLITUS WITHOUT COMPLICATION, WITH LONG-TERM CURRENT USE OF INSULIN (HCC): Primary | ICD-10-CM

## 2022-07-14 DIAGNOSIS — E11.9 CONTROLLED TYPE 2 DIABETES MELLITUS WITHOUT COMPLICATION, WITH LONG-TERM CURRENT USE OF INSULIN (HCC): Primary | ICD-10-CM

## 2022-07-14 PROCEDURE — 99999 PR OFFICE/OUTPT VISIT,PROCEDURE ONLY: CPT | Performed by: PODIATRIST

## 2022-07-14 PROCEDURE — 11720 DEBRIDE NAIL 1-5: CPT | Performed by: PODIATRIST

## 2022-07-14 RX ORDER — BETAMETHASONE DIPROPIONATE 0.5 MG/G
CREAM TOPICAL
COMMUNITY
Start: 2022-07-06

## 2022-07-14 RX ORDER — CLOTRIMAZOLE 1 %
CREAM (GRAM) TOPICAL
COMMUNITY
Start: 2022-06-09

## 2022-07-14 NOTE — PROGRESS NOTES
Subjective:  Patient presents to [facility] today for routine foot care. Patient denies any new problems with their feet. Allergies   Allergen Reactions    Bicillin [Penicillin G Benzathine] Shortness Of Breath    Penicillins      Other reaction(s): PENICILLINS    Ziprasidone Other (See Comments)     \"I can't sit still\"  Pt could not sit still, hyper         Past Medical History:   Diagnosis Date    Depression     Diabetes mellitus ([facility] [address]     Thyroid disease     Hypothyroid       Prior to Admission medications    Medication Sig Start Date End Date Taking?  Authorizing Provider   betamethasone dipropionate 0.05 % cream APPLY TO PALM TO RASH TWICE DAILY 7/6/22  Yes Historical Provider, MD   clotrimazole (LOTRIMIN) 1 % cream APPLY TO THE AFFECTED AREA(S) TWICE DAILY FOR THREE WEEKS 6/9/22  Yes Historical Provider, MD   loxapine (LOXITANE) 10 MG capsule Take by mouth 1/21/21  Yes Historical Provider, MD   loxapine (LOXITANE) 10 MG capsule  1/22/21  Yes Historical Provider, MD   atorvastatin (LIPITOR) 20 MG tablet Take 20 mg by mouth daily  11/12/19  Yes Historical Provider, MD   glimepiride (AMARYL) 2 MG tablet Take 2 mg by mouth every morning (before breakfast)  11/8/19  Yes Historical Provider, MD   lisinopril (PRINIVIL;ZESTRIL) 2.5 MG tablet 2.5 mg daily  11/8/19  Yes Historical Provider, MD   levothyroxine (SYNTHROID) 25 MCG tablet 25 mcg Daily  11/8/19  Yes Historical Provider, MD   Insulin Glargine-Lixisenatide (SOLIQUA) 100-33 UNT-MCG/ML SOPN Inject 10 Units into the skin daily  Patient taking differently: Inject 40 Units into the skin daily  9/23/19  Yes Dominique Bowen MD   metoprolol tartrate (LOPRESSOR) 25 MG tablet Take 1 tablet by mouth 2 times daily 9/23/19  Yes Dominique Bowen MD   metFORMIN (GLUCOPHAGE) 1000 MG tablet Take 1 tablet by mouth 2 times daily (with meals) 9/23/19  Yes Dominique Bowen MD   lactulose (CHRONULAC) 10 GM/15ML solution Take 15 mLs by mouth 3 times daily 8/29/19

## 2022-07-14 NOTE — PROGRESS NOTES
Foot Care Worksheet  PCP: Scottie Rasheed MD  Last visit: 7/7/2022      Nail description:  Thick , Yellow , Crumbly , Marked limitation of ambulation     Pain resulting from thickened and dystrophy of nail plate No    Nails involved  Right   1  (T5-T9)  Left    1, 2  (TA-T4)    Q7 1 Class A Finding - Non traumatic amputation of foot No    Q8 2 Class B Findings - Absent DP pulse No, Absent PT pulse No, Advanced tropic changes (3 required) Yes    Decrease hair growth Yes, Nail changes/thickening Yes, Pigmented changes/discoloration Yes, Skin texture (thin, shiny) No, Skin color (rubor/redness) No    Q9 1 Class B and 2 Class C Findings  Claudication No, Temperature change Yes, Paresthesia No, Burning No, Edema Yes

## 2022-09-12 ENCOUNTER — HOSPITAL ENCOUNTER (OUTPATIENT)
Dept: LAB | Age: 62
Discharge: HOME | End: 2022-09-12
Payer: MEDICARE

## 2022-09-12 PROCEDURE — 36415 COLL VENOUS BLD VENIPUNCTURE: CPT

## 2022-09-12 PROCEDURE — 80061 LIPID PANEL: CPT

## 2022-09-13 LAB
CHOLESTEROL/HDL RATIO: 5.1
CHOLESTEROL: 183 MG/DL
HDLC SERPL-MCNC: 36 MG/DL
LDL CHOLESTEROL: 95 MG/DL (ref 0–130)
TRIGL SERPL-MCNC: 262 MG/DL

## 2022-10-13 ENCOUNTER — OFFICE VISIT (OUTPATIENT)
Dept: PODIATRY | Age: 62
End: 2022-10-13
Payer: MEDICARE

## 2022-10-13 VITALS
BODY MASS INDEX: 35.32 KG/M2 | HEART RATE: 68 BPM | HEIGHT: 65 IN | WEIGHT: 212 LBS | SYSTOLIC BLOOD PRESSURE: 122 MMHG | DIASTOLIC BLOOD PRESSURE: 68 MMHG

## 2022-10-13 DIAGNOSIS — Z79.4 CONTROLLED TYPE 2 DIABETES MELLITUS WITHOUT COMPLICATION, WITH LONG-TERM CURRENT USE OF INSULIN (HCC): Primary | ICD-10-CM

## 2022-10-13 DIAGNOSIS — E11.9 CONTROLLED TYPE 2 DIABETES MELLITUS WITHOUT COMPLICATION, WITH LONG-TERM CURRENT USE OF INSULIN (HCC): Primary | ICD-10-CM

## 2022-10-13 DIAGNOSIS — B35.1 DERMATOPHYTOSIS OF NAIL: ICD-10-CM

## 2022-10-13 PROCEDURE — 11720 DEBRIDE NAIL 1-5: CPT | Performed by: PODIATRIST

## 2022-10-13 PROCEDURE — 99999 PR OFFICE/OUTPT VISIT,PROCEDURE ONLY: CPT | Performed by: PODIATRIST

## 2022-10-13 NOTE — PROGRESS NOTES
Foot Care Worksheet  PCP: Nguyễn Rey MD  Last visit: 9/1/22      Nail description: Thick , Yellow , Crumbly , Marked limitation of ambulation     Pain resulting from thickened and dystrophy of nail plate No    Nails involved  Right   1  (T5-T9)  Left    1, 2  (TA-T4)    Q7 1 Class A Finding - Non traumatic amputation of foot No    Q8 2 Class B Findings - Absent DP pulse No, Absent PT pulse No, Advanced tropic changes (3 required) Yes    Decrease hair growth Yes, Nail changes/thickening Yes, Pigmented changes/discoloration Yes, Skin texture (thin, shiny) No, Skin color (rubor/redness) No    Q9 1 Class B and 2 Class C Findings  Claudication No, Temperature change Yes, Paresthesia No, Burning No, Edema Yes  Subjective:  Patient presents to [facility] today for routine foot care. Patient denies any new problems with their feet. Allergies   Allergen Reactions    Bicillin [Penicillin G Benzathine] Shortness Of Breath    Penicillins      Other reaction(s): PENICILLINS    Ziprasidone Other (See Comments)     \"I can't sit still\"  Pt could not sit still, hyper         Past Medical History:   Diagnosis Date    Depression     Diabetes mellitus ([facility] [address]     Thyroid disease     Hypothyroid       Prior to Admission medications    Medication Sig Start Date End Date Taking?  Authorizing Provider   betamethasone dipropionate 0.05 % cream APPLY TO PALM TO RASH TWICE DAILY 7/6/22  Yes Historical Provider, MD   clotrimazole (LOTRIMIN) 1 % cream APPLY TO THE AFFECTED AREA(S) TWICE DAILY FOR THREE WEEKS 6/9/22  Yes Historical Provider, MD   loxapine (LOXITANE) 10 MG capsule Take by mouth 1/21/21  Yes Historical Provider, MD   loxapine (LOXITANE) 10 MG capsule  1/22/21  Yes Historical Provider, MD   atorvastatin (LIPITOR) 20 MG tablet Take 20 mg by mouth daily  11/12/19  Yes Historical Provider, MD   glimepiride (AMARYL) 2 MG tablet Take 2 mg by mouth every morning (before breakfast)  11/8/19  Yes Historical Provider, MD lisinopril (PRINIVIL;ZESTRIL) 2.5 MG tablet 2.5 mg daily  11/8/19  Yes Historical Provider, MD   levothyroxine (SYNTHROID) 25 MCG tablet 25 mcg Daily  11/8/19  Yes Historical Provider, MD   Insulin Glargine-Lixisenatide (SOLIQUA) 100-33 UNT-MCG/ML SOPN Inject 10 Units into the skin daily  Patient taking differently: Inject 40 Units into the skin daily 9/23/19  Yes Huy Frazier MD   metoprolol tartrate (LOPRESSOR) 25 MG tablet Take 1 tablet by mouth 2 times daily 9/23/19  Yes Huy Frazier MD   metFORMIN (GLUCOPHAGE) 1000 MG tablet Take 1 tablet by mouth 2 times daily (with meals) 9/23/19  Yes Huy Frazier MD   lactulose (CHRONULAC) 10 GM/15ML solution Take 15 mLs by mouth 3 times daily 8/29/19  Yes Rubina Rodriguez MD   melatonin 3 MG TABS tablet Take 5 mg by mouth nightly as needed   Yes Historical Provider, MD   diphenhydrAMINE (BENADRYL) 25 MG capsule Take 2 capsules by mouth every 6 hours as needed for Itching 11/4/18  Yes Otto Kulkarni MD       Social History     Tobacco Use    Smoking status: Former     Packs/day: 1.00     Years: 40.00     Pack years: 40.00     Types: Cigarettes     Quit date: 3/6/2019     Years since quitting: 3.6    Smokeless tobacco: Never    Tobacco comments:     HARRIET Mortensen [facility] 8/6/19   Substance Use Topics    Alcohol use: No     ROS: All 14 ROS systems reviewed and pertinent positives noted above, all others negative. Objective:  Vascular: DP and PT pulses palpable 2/4, bilateral.  CFT <3 seconds, bilateral.  Hair growth present to the level of the digits, bilateral.  Edema absent, bilateral.  Varicosities absent, bilateral. Erythema absent, bilateral. Distal Rubor absent bilateral.  Temperature within normal limits bilateral. Hyperpigmentation absent bilateral. No atrophic skin.   Neurological: Sensation intact to light touch to level of digits, bilateral.  Protective sensation intact 10/10 sites via 5.07/10g [place]-Rylan Monofilament, bilateral.  negative Tinel's, bilateral. negative Valleix sign, bilateral.  Vibratory intact bilateral.  Reflexes Decreased bilateral.  Paresthesias negative. Dysthesias negative. Sharp/dull intact bilateral.    Integument:  Nails left 1 2 and right 1 thickened, dystrophic and crumbly, discolored with subungual debris. Hyperkeratotic tissue is absent. Assessment:   Diagnosis Orders   1. Controlled type 2 diabetes mellitus without complication, with long-term current use of insulin ([address]        2. Dermatophytosis of nail            Plan:  Nails as mentioned above debrided in length and thickness. Patient advised OTC methods for treatment of the mycotic nails. Patient will follow up in 10 weeks.

## 2022-11-10 ENCOUNTER — OFFICE VISIT (OUTPATIENT)
Dept: PODIATRY | Age: 62
End: 2022-11-10
Payer: MEDICARE

## 2022-11-10 VITALS
WEIGHT: 214 LBS | DIASTOLIC BLOOD PRESSURE: 70 MMHG | SYSTOLIC BLOOD PRESSURE: 132 MMHG | RESPIRATION RATE: 20 BRPM | BODY MASS INDEX: 35.61 KG/M2 | HEART RATE: 100 BPM

## 2022-11-10 DIAGNOSIS — L03.032 PARONYCHIA, TOE, LEFT: Primary | ICD-10-CM

## 2022-11-10 DIAGNOSIS — E11.9 CONTROLLED TYPE 2 DIABETES MELLITUS WITHOUT COMPLICATION, WITH LONG-TERM CURRENT USE OF INSULIN (HCC): ICD-10-CM

## 2022-11-10 DIAGNOSIS — Z79.4 CONTROLLED TYPE 2 DIABETES MELLITUS WITHOUT COMPLICATION, WITH LONG-TERM CURRENT USE OF INSULIN (HCC): ICD-10-CM

## 2022-11-10 PROCEDURE — G8417 CALC BMI ABV UP PARAM F/U: HCPCS | Performed by: PODIATRIST

## 2022-11-10 PROCEDURE — 99214 OFFICE O/P EST MOD 30 MIN: CPT | Performed by: PODIATRIST

## 2022-11-10 PROCEDURE — G8427 DOCREV CUR MEDS BY ELIG CLIN: HCPCS | Performed by: PODIATRIST

## 2022-11-10 PROCEDURE — 1036F TOBACCO NON-USER: CPT | Performed by: PODIATRIST

## 2022-11-10 PROCEDURE — 99213 OFFICE O/P EST LOW 20 MIN: CPT | Performed by: PODIATRIST

## 2022-11-10 PROCEDURE — 3046F HEMOGLOBIN A1C LEVEL >9.0%: CPT | Performed by: PODIATRIST

## 2022-11-10 PROCEDURE — 3017F COLORECTAL CA SCREEN DOC REV: CPT | Performed by: PODIATRIST

## 2022-11-10 PROCEDURE — G8484 FLU IMMUNIZE NO ADMIN: HCPCS | Performed by: PODIATRIST

## 2022-11-10 PROCEDURE — 2022F DILAT RTA XM EVC RTNOPTHY: CPT | Performed by: PODIATRIST

## 2022-11-10 RX ORDER — DOXYCYCLINE HYCLATE 100 MG/1
100 CAPSULE ORAL 2 TIMES DAILY
Qty: 20 CAPSULE | Refills: 0 | Status: SHIPPED | OUTPATIENT
Start: 2022-11-10 | End: 2022-11-20

## 2022-11-10 NOTE — PROGRESS NOTES
Subjective:  Ruddy Reyes is a 64 y.o. female who presents to the office today complaining of an redness L big toe. Symptoms began 1 week(s) ago. Patient relates pain is Present. Pain is rated 2 out of 10 and is described as mild. Treatments prior to today's visit include: ointment. Currently denies F/C/N/V. Allergies   Allergen Reactions    Bicillin [Penicillin G Benzathine] Shortness Of Breath    Penicillins      Other reaction(s): PENICILLINS    Ziprasidone Other (See Comments)     \"I can't sit still\"  Pt could not sit still, hyper         Past Medical History:   Diagnosis Date    Depression     Diabetes mellitus ([facility] [address]     Thyroid disease     Hypothyroid       Prior to Admission medications    Medication Sig Start Date End Date Taking?  Authorizing Provider   betamethasone dipropionate 0.05 % cream APPLY TO PALM TO RASH TWICE DAILY 7/6/22  Yes Historical Provider, MD   clotrimazole (LOTRIMIN) 1 % cream APPLY TO THE AFFECTED AREA(S) TWICE DAILY FOR THREE WEEKS 6/9/22  Yes Historical Provider, MD   loxapine (LOXITANE) 10 MG capsule Take by mouth 1/21/21  Yes Historical Provider, MD   loxapine (LOXITANE) 10 MG capsule  1/22/21  Yes Historical Provider, MD   atorvastatin (LIPITOR) 20 MG tablet Take 20 mg by mouth daily  11/12/19  Yes Historical Provider, MD   glimepiride (AMARYL) 2 MG tablet Take 2 mg by mouth every morning (before breakfast)  11/8/19  Yes Historical Provider, MD   lisinopril (PRINIVIL;ZESTRIL) 2.5 MG tablet 2.5 mg daily  11/8/19  Yes Historical Provider, MD   levothyroxine (SYNTHROID) 25 MCG tablet 25 mcg Daily  11/8/19  Yes Historical Provider, MD   Insulin Glargine-Lixisenatide (SOLIQUA) 100-33 UNT-MCG/ML SOPN Inject 10 Units into the skin daily  Patient taking differently: Inject 40 Units into the skin daily 9/23/19  Yes Jannet Marquez MD   metoprolol tartrate (LOPRESSOR) 25 MG tablet Take 1 tablet by mouth 2 times daily 9/23/19  Yes Jannet Marquez MD   metFORMIN (GLUCOPHAGE) 1000 MG tablet Take 1 tablet by mouth 2 times daily (with meals) 9/23/19  Yes Bryon Mo MD   lactulose [facility]) 10 GM/15ML solution Take 15 mLs by mouth 3 times daily 8/29/19  Yes Melissa Lentz MD   melatonin 3 MG TABS tablet Take 5 mg by mouth nightly as needed   Yes Historical Provider, MD   diphenhydrAMINE (BENADRYL) 25 MG capsule Take 2 capsules by mouth every 6 hours as needed for Itching 11/4/18  Yes Bill Coburn MD     ROS: All 14 ROS systems reviewed and pertinent positives noted above, all others negative. Objective:  Patient is alert and oriented. Vascular: DP and PT pulses palpable 2/4, bilateral.  CFT <3 seconds, bilateral.  Hair growth absent to the level of the digits, bilateral.  Edema present, bilateral.  Varicosities present, bilateral. Erythema absent, bilateral. Distal Rubor absent bilateral.  Temperature decreased bilateral. Hyperpigmentation present bilateral. Atrophic skin no  Neuro: Protective sensation is intact. Reflexes WNL. Musculoskeletal:  Pain present upon palpation of left, lateral, hallux. Muscle strength 5/5, Bilateral. within normal limits medial longitudinal arch, Bilateral. ROM WNL. Integument: Onychocryptosis present left, lateral, hallux. Granuloma is absent left. Paronychia changes with drainage and crust are present left, lateral, hallux. Skin color, texture, turgor normal. No rashes or lesions. .    Assessment:   Diagnosis Orders   1. Paronychia, toe, left        2. Controlled type 2 diabetes mellitus without complication, with long-term current use of insulin ([address]              Plan:   Patient examined and evaluated. Current condition and treatment options discussed in detail. Slant back nail cut took place of L latreal hallux nail. No need for nail procedure currently, any increase in pain of signs of infection then patient will be seen back for a nail procedure.   Patient will soak qd in warm soapy water or epsom salts and will use OTC antibiotic ointment daily.   Orders Placed This Encounter   Medications    doxycycline hyclate (VIBRAMYCIN) 100 MG capsule     Sig: Take 1 capsule by mouth 2 times daily for 10 days     Dispense:  20 capsule     Refill:  0     Patient is low level medical decision making. Patient is in acute uncomplicated condition. No new testing. 1 new prescription.   Low risk morbidity current treatment course

## 2023-01-19 ENCOUNTER — OFFICE VISIT (OUTPATIENT)
Dept: PODIATRY | Age: 63
End: 2023-01-19
Payer: MEDICARE

## 2023-01-19 VITALS
RESPIRATION RATE: 20 BRPM | WEIGHT: 207 LBS | HEART RATE: 84 BPM | SYSTOLIC BLOOD PRESSURE: 132 MMHG | DIASTOLIC BLOOD PRESSURE: 80 MMHG | BODY MASS INDEX: 34.45 KG/M2

## 2023-01-19 DIAGNOSIS — E11.9 CONTROLLED TYPE 2 DIABETES MELLITUS WITHOUT COMPLICATION, WITH LONG-TERM CURRENT USE OF INSULIN (HCC): Primary | ICD-10-CM

## 2023-01-19 DIAGNOSIS — Z79.4 CONTROLLED TYPE 2 DIABETES MELLITUS WITHOUT COMPLICATION, WITH LONG-TERM CURRENT USE OF INSULIN (HCC): Primary | ICD-10-CM

## 2023-01-19 DIAGNOSIS — B35.1 DERMATOPHYTOSIS OF NAIL: ICD-10-CM

## 2023-01-19 PROCEDURE — 11720 DEBRIDE NAIL 1-5: CPT | Performed by: PODIATRIST

## 2023-04-06 ENCOUNTER — HOSPITAL ENCOUNTER (OUTPATIENT)
Age: 63
Discharge: HOME | End: 2023-04-06
Payer: MEDICARE

## 2023-04-06 LAB
ABSOLUTE EOS #: 0.22 K/UL (ref 0–0.44)
ABSOLUTE IMMATURE GRANULOCYTE: 0.03 K/UL (ref 0–0.3)
ABSOLUTE LYMPH #: 2.27 K/UL (ref 1.1–3.7)
ABSOLUTE MONO #: 0.55 K/UL (ref 0.1–1.2)
ALBUMIN SERPL-MCNC: 4.2 G/DL (ref 3.5–5.2)
ALBUMIN/GLOBULIN RATIO: 1.2 (ref 1–2.5)
ALP SERPL-CCNC: 137 U/L (ref 35–104)
ALT SERPL-CCNC: 41 U/L (ref 5–33)
ANION GAP SERPL CALCULATED.3IONS-SCNC: 12 MMOL/L (ref 9–17)
AST SERPL-CCNC: 52 U/L
BASOPHILS # BLD: 1 % (ref 0–2)
BASOPHILS ABSOLUTE: 0.07 K/UL (ref 0–0.2)
BILIRUB SERPL-MCNC: 0.3 MG/DL (ref 0.3–1.2)
BUN SERPL-MCNC: 12 MG/DL (ref 8–23)
BUN/CREAT BLD: 15 (ref 9–20)
CALCIUM SERPL-MCNC: 9.5 MG/DL (ref 8.6–10.4)
CHLORIDE SERPL-SCNC: 96 MMOL/L (ref 98–107)
CHOLEST SERPL-MCNC: 166 MG/DL
CHOLESTEROL/HDL RATIO: 5.2
CO2 SERPL-SCNC: 26 MMOL/L (ref 20–31)
CREAT SERPL-MCNC: 0.82 MG/DL (ref 0.5–0.9)
EOSINOPHILS RELATIVE PERCENT: 2 % (ref 1–4)
ERYTHROCYTE [SEDIMENTATION RATE] IN BLOOD: 18 MM/HR (ref 0–30)
GFR SERPL CREATININE-BSD FRML MDRD: >60 ML/MIN/1.73M2
GLUCOSE SERPL-MCNC: 315 MG/DL (ref 70–99)
HCT VFR BLD AUTO: 42.5 % (ref 36.3–47.1)
HDLC SERPL-MCNC: 32 MG/DL
HGB BLD-MCNC: 13.9 G/DL (ref 11.9–15.1)
IMMATURE GRANULOCYTES: 0 %
LDLC SERPL CALC-MCNC: 85 MG/DL (ref 0–130)
LYMPHOCYTES # BLD: 22 % (ref 24–43)
MCH RBC QN AUTO: 29.6 PG (ref 25.2–33.5)
MCHC RBC AUTO-ENTMCNC: 32.7 G/DL (ref 25.2–33.5)
MCV RBC AUTO: 90.6 FL (ref 82.6–102.9)
MONOCYTES # BLD: 5 % (ref 3–12)
NRBC AUTOMATED: 0 PER 100 WBC
PDW BLD-RTO: 12.9 % (ref 11.8–14.4)
PLATELET # BLD AUTO: 256 K/UL (ref 138–453)
PMV BLD AUTO: 10.1 FL (ref 8.1–13.5)
POTASSIUM SERPL-SCNC: 4.6 MMOL/L (ref 3.7–5.3)
PROT SERPL-MCNC: 7.6 G/DL (ref 6.4–8.3)
RBC # BLD: 4.69 M/UL (ref 3.95–5.11)
SEG NEUTROPHILS: 70 % (ref 36–65)
SEGMENTED NEUTROPHILS ABSOLUTE COUNT: 7 K/UL (ref 1.5–8.1)
SODIUM SERPL-SCNC: 134 MMOL/L (ref 135–144)
TRIGL SERPL-MCNC: 247 MG/DL
TSH SERPL-ACNC: 4.07 UIU/ML (ref 0.3–5)
URATE SERPL-MCNC: 7.3 MG/DL (ref 2.4–5.7)
WBC # BLD AUTO: 10.1 K/UL (ref 3.5–11.3)

## 2023-04-06 PROCEDURE — 84550 ASSAY OF BLOOD/URIC ACID: CPT

## 2023-04-06 PROCEDURE — 85025 COMPLETE CBC W/AUTO DIFF WBC: CPT

## 2023-04-06 PROCEDURE — 84443 ASSAY THYROID STIM HORMONE: CPT

## 2023-04-06 PROCEDURE — 80053 COMPREHEN METABOLIC PANEL: CPT

## 2023-04-06 PROCEDURE — 85652 RBC SED RATE AUTOMATED: CPT

## 2023-04-06 PROCEDURE — 36415 COLL VENOUS BLD VENIPUNCTURE: CPT

## 2023-04-06 PROCEDURE — 80061 LIPID PANEL: CPT

## 2023-05-30 ENCOUNTER — HOSPITAL ENCOUNTER (OUTPATIENT)
Dept: MAMMOGRAPHY | Age: 63
Discharge: HOME | End: 2023-06-01
Payer: MEDICARE

## 2023-05-30 DIAGNOSIS — Z12.31 ENCOUNTER FOR SCREENING MAMMOGRAM FOR MALIGNANT NEOPLASM OF BREAST: ICD-10-CM

## 2023-05-30 PROCEDURE — 77063 BREAST TOMOSYNTHESIS BI: CPT

## 2023-06-22 ENCOUNTER — OFFICE VISIT (OUTPATIENT)
Dept: PODIATRY | Age: 63
End: 2023-06-22
Payer: MEDICARE

## 2023-06-22 VITALS
SYSTOLIC BLOOD PRESSURE: 128 MMHG | HEART RATE: 100 BPM | BODY MASS INDEX: 33.95 KG/M2 | DIASTOLIC BLOOD PRESSURE: 80 MMHG | WEIGHT: 204 LBS | RESPIRATION RATE: 20 BRPM

## 2023-06-22 DIAGNOSIS — E11.9 CONTROLLED TYPE 2 DIABETES MELLITUS WITHOUT COMPLICATION, WITH LONG-TERM CURRENT USE OF INSULIN (HCC): Primary | ICD-10-CM

## 2023-06-22 DIAGNOSIS — Z79.4 CONTROLLED TYPE 2 DIABETES MELLITUS WITHOUT COMPLICATION, WITH LONG-TERM CURRENT USE OF INSULIN (HCC): Primary | ICD-10-CM

## 2023-06-22 DIAGNOSIS — B35.1 DERMATOPHYTOSIS OF NAIL: ICD-10-CM

## 2023-06-22 PROCEDURE — 11720 DEBRIDE NAIL 1-5: CPT | Performed by: PODIATRIST

## 2023-06-22 NOTE — PROGRESS NOTES
Provider, MD   lisinopril (PRINIVIL;ZESTRIL) 2.5 MG tablet 1 tablet daily 19  Yes Historical Provider, MD   levothyroxine (SYNTHROID) 25 MCG tablet 1 tablet Daily 19  Yes Historical Provider, MD   Insulin Glargine-Lixisenatide (SOLIQUA) 100-33 UNT-MCG/ML SOPN Inject 10 Units into the skin daily  Patient taking differently: Inject 45 Units into the skin daily 19  Yes Lili Ganser, MD   metoprolol tartrate (LOPRESSOR) 25 MG tablet Take 1 tablet by mouth 2 times daily 19  Yes Lili Ganser, MD   metFORMIN (GLUCOPHAGE) 1000 MG tablet Take 1 tablet by mouth 2 times daily (with meals) 19  Yes Lili Ganser, MD   lactulose ([address]) 10 GM/15ML solution Take 15 mLs by mouth 3 times daily 19  Yes Claudio Petit MD   melatonin 3 MG TABS tablet Take 5 mg by mouth nightly as needed   Yes Historical Provider, MD   diphenhydrAMINE (BENADRYL) 25 MG capsule Take 2 capsules by mouth every 6 hours as needed for Itching 18  Yes Linda Maravilla MD       Social History     Tobacco Use    Smoking status: Former     Packs/day: 1.00     Years: 40.00     Pack years: 40.00     Types: Cigarettes     Quit date: 3/6/2019     Years since quittin.2    Smokeless tobacco: Never    Tobacco comments:     HARRIET Carter RRT 19   Substance Use Topics    Alcohol use: No     ROS: All 14 ROS systems reviewed and pertinent positives noted above, all others negative. Objective:  Vascular: DP and PT pulses palpable 2/4, bilateral.  CFT <3 seconds, bilateral.  Hair growth present to the level of the digits, bilateral.  Edema absent, bilateral.  Varicosities absent, bilateral. Erythema absent, bilateral. Distal Rubor absent bilateral.  Temperature within normal limits bilateral. Hyperpigmentation absent bilateral. No atrophic skin.   Neurological: Sensation intact to light touch to level of digits, bilateral.  Protective sensation intact 10/10 sites via 5.07/10g [place]-Rylan Monofilament, bilateral.  negative

## 2023-08-16 ENCOUNTER — HOSPITAL ENCOUNTER (OUTPATIENT)
Age: 63
Setting detail: SPECIMEN
Discharge: HOME | End: 2023-08-16

## 2023-08-28 LAB — CYTOLOGY REPORT: NORMAL

## 2023-08-31 ENCOUNTER — OFFICE VISIT (OUTPATIENT)
Dept: PODIATRY | Age: 63
End: 2023-08-31
Payer: MEDICARE

## 2023-08-31 VITALS
RESPIRATION RATE: 20 BRPM | DIASTOLIC BLOOD PRESSURE: 76 MMHG | HEART RATE: 84 BPM | WEIGHT: 198 LBS | SYSTOLIC BLOOD PRESSURE: 124 MMHG | BODY MASS INDEX: 32.95 KG/M2

## 2023-08-31 DIAGNOSIS — Z79.4 CONTROLLED TYPE 2 DIABETES MELLITUS WITHOUT COMPLICATION, WITH LONG-TERM CURRENT USE OF INSULIN (HCC): Primary | ICD-10-CM

## 2023-08-31 DIAGNOSIS — E11.9 CONTROLLED TYPE 2 DIABETES MELLITUS WITHOUT COMPLICATION, WITH LONG-TERM CURRENT USE OF INSULIN (HCC): Primary | ICD-10-CM

## 2023-08-31 DIAGNOSIS — B35.1 DERMATOPHYTOSIS OF NAIL: ICD-10-CM

## 2023-08-31 PROCEDURE — 11720 DEBRIDE NAIL 1-5: CPT | Performed by: PODIATRIST

## 2023-08-31 NOTE — PROGRESS NOTES
Provider, MD   lisinopril (PRINIVIL;ZESTRIL) 2.5 MG tablet 1 tablet daily 19  Yes Historical Provider, MD   levothyroxine (SYNTHROID) 25 MCG tablet 1 tablet Daily 19  Yes Historical Provider, MD   Insulin Glargine-Lixisenatide (SOLIQUA) 100-33 UNT-MCG/ML SOPN Inject 10 Units into the skin daily  Patient taking differently: Inject 45 Units into the skin daily 19  Yes Ricki Mcmullen MD   metoprolol tartrate (LOPRESSOR) 25 MG tablet Take 1 tablet by mouth 2 times daily 19  Yes Ricki Mcmullen MD   metFORMIN (GLUCOPHAGE) 1000 MG tablet Take 1 tablet by mouth 2 times daily (with meals) 19  Yes Ricki Mcmullen MD   lactulose ([address]) 10 GM/15ML solution Take 15 mLs by mouth 3 times daily 19  Yes Rubén New MD   melatonin 3 MG TABS tablet Take 5 mg by mouth nightly as needed   Yes Historical Provider, MD   diphenhydrAMINE (BENADRYL) 25 MG capsule Take 2 capsules by mouth every 6 hours as needed for Itching 18  Yes Mateo Calvillo MD       Social History     Tobacco Use    Smoking status: Former     Packs/day: 1.00     Years: 40.00     Pack years: 40.00     Types: Cigarettes     Quit date: 3/6/2019     Years since quittin.4    Smokeless tobacco: Never    Tobacco comments:     HARRIET Do RUST 19   Substance Use Topics    Alcohol use: No     ROS: All 14 ROS systems reviewed and pertinent positives noted above, all others negative. Objective:  Vascular: DP and PT pulses palpable 2/4, bilateral.  CFT <3 seconds, bilateral.  Hair growth present to the level of the digits, bilateral.  Edema absent, bilateral.  Varicosities absent, bilateral. Erythema absent, bilateral. Distal Rubor absent bilateral.  Temperature within normal limits bilateral. Hyperpigmentation absent bilateral. No atrophic skin.   Neurological: Sensation intact to light touch to level of digits, bilateral.  Protective sensation intact 10/10 sites via 5.07/10g [place] [employer]of[employer], bilateral.  negative

## 2023-09-16 NOTE — PATIENT INSTRUCTIONS
Plantar Fasciitis Treatment    1. Stretching - 3 times per day minimum for 5-10 minutes. Include stretching against a wall or counter with one foot back and heel flat,    Leaning into a step, or using a towel or belt. Be active and aggressive with your stretching for maximum benefit. 2.  Ice - 3 times per day. Use a frozen water bottle and roll your foot over it. Helps to  stretch and massage the area at the same time. 3.  Anti-inflammatory - take OTC or Rx as ordered. 4.  Arch support and supportive shoe gear. Wear orthotics or prefabricated arch supports at ALL times in supportive  shoegear. Do NOT go barefoot. bite, insect

## 2023-11-09 ENCOUNTER — OFFICE VISIT (OUTPATIENT)
Dept: PODIATRY | Age: 63
End: 2023-11-09
Payer: MEDICARE

## 2023-11-09 VITALS
DIASTOLIC BLOOD PRESSURE: 80 MMHG | BODY MASS INDEX: 33.49 KG/M2 | SYSTOLIC BLOOD PRESSURE: 124 MMHG | WEIGHT: 201 LBS | HEART RATE: 88 BPM | HEIGHT: 65 IN

## 2023-11-09 DIAGNOSIS — E11.9 CONTROLLED TYPE 2 DIABETES MELLITUS WITHOUT COMPLICATION, WITH LONG-TERM CURRENT USE OF INSULIN (HCC): Primary | ICD-10-CM

## 2023-11-09 DIAGNOSIS — B35.1 DERMATOPHYTOSIS OF NAIL: ICD-10-CM

## 2023-11-09 DIAGNOSIS — Z79.4 CONTROLLED TYPE 2 DIABETES MELLITUS WITHOUT COMPLICATION, WITH LONG-TERM CURRENT USE OF INSULIN (HCC): Primary | ICD-10-CM

## 2023-11-09 PROCEDURE — 11720 DEBRIDE NAIL 1-5: CPT | Performed by: PODIATRIST

## 2023-11-09 PROCEDURE — 99999 PR OFFICE/OUTPT VISIT,PROCEDURE ONLY: CPT | Performed by: PODIATRIST

## 2023-11-09 NOTE — PROGRESS NOTES
Foot Care Worksheet  PCP: Jonas Farmer MD  Last visit: 08 / 16 / 2023      Nail description: Thick , Yellow , Crumbly , Marked limitation of ambulation     Pain resulting from thickened and dystrophy of nail plate No    Nails involved  Right   1  (T5-T9)  Left    1, 2  (TA-T4)    Q7 1 Class A Finding - Non traumatic amputation of foot No    Q8 2 Class B Findings - Absent DP pulse No, Absent PT pulse No, Advanced tropic changes (3 required) Yes    Decrease hair growth Yes, Nail changes/thickening Yes, Pigmented changes/discoloration Yes, Skin texture (thin, shiny) No, Skin color (rubor/redness) No    Q9 1 Class B and 2 Class C Findings  Claudication No, Temperature change Yes, Paresthesia No, Burning No, Edema Yes  Subjective:  Patient presents to [facility] today for routine foot care. Patient denies any new problems with their feet. Allergies   Allergen Reactions    Bicillin [Penicillin G Benzathine] Shortness Of Breath    Penicillins      Other reaction(s): PENICILLINS    Ziprasidone Other (See Comments)     \"I can't sit still\"  Pt could not sit still, hyper         Past Medical History:   Diagnosis Date    Depression     Diabetes mellitus ([address] [facility])     Thyroid disease     Hypothyroid       Prior to Admission medications    Medication Sig Start Date End Date Taking?  Authorizing Provider   betamethasone dipropionate 0.05 % cream APPLY TO PALM TO RASH TWICE DAILY 7/6/22  Yes Victorina Hernandez MD   clotrimazole (LOTRIMIN) 1 % cream APPLY TO THE AFFECTED AREA(S) TWICE DAILY FOR THREE WEEKS 6/9/22  Yes Victorina Hernandez MD   loxapine (LOXITANE) 10 MG capsule Take by mouth 1/21/21  Yes Victorina Hernandez MD   loxapine (LOXITANE) 10 MG capsule  1/22/21  Yes Victorina Hernandez MD   atorvastatin (LIPITOR) 20 MG tablet Take 1 tablet by mouth daily 11/12/19  Yes Victorina Hernandez MD   glimepiride (AMARYL) 2 MG tablet Take 1 tablet by mouth every morning (before breakfast) 11/8/19  Yes

## 2023-11-15 ENCOUNTER — HOSPITAL ENCOUNTER (OUTPATIENT)
Age: 63
Setting detail: SPECIMEN
Discharge: HOME | End: 2023-11-15

## 2023-11-16 LAB
ALBUMIN SERPL-MCNC: 4.5 G/DL (ref 3.5–5.2)
ALBUMIN/GLOB SERPL: 2 {RATIO} (ref 1–2.5)
ALP SERPL-CCNC: 125 U/L (ref 35–104)
ALT SERPL-CCNC: 51 U/L (ref 10–35)
ANION GAP SERPL CALCULATED.3IONS-SCNC: 16 MMOL/L (ref 9–16)
AST SERPL-CCNC: 46 U/L (ref 10–35)
BILIRUB SERPL-MCNC: 0.3 MG/DL (ref 0–1.2)
BUN SERPL-MCNC: 14 MG/DL (ref 8–23)
CALCIUM SERPL-MCNC: 9.6 MG/DL (ref 8.6–10.4)
CHLORIDE SERPL-SCNC: 96 MMOL/L (ref 98–107)
CHOLEST SERPL-MCNC: 185 MG/DL (ref 0–199)
CHOLESTEROL/HDL RATIO: 5
CO2 SERPL-SCNC: 26 MMOL/L (ref 20–31)
CREAT SERPL-MCNC: 0.8 MG/DL (ref 0.5–0.9)
ERYTHROCYTE [DISTWIDTH] IN BLOOD BY AUTOMATED COUNT: 13.7 % (ref 11.8–14.4)
GFR SERPL CREATININE-BSD FRML MDRD: >60 ML/MIN/1.73M2
GLUCOSE SERPL-MCNC: 122 MG/DL (ref 74–99)
HCT VFR BLD AUTO: 47.6 % (ref 36.3–47.1)
HDLC SERPL-MCNC: 37 MG/DL
HGB BLD-MCNC: 16 G/DL (ref 11.9–15.1)
LDLC SERPL CALC-MCNC: 105 MG/DL (ref 0–100)
MCH RBC QN AUTO: 30 PG (ref 25.2–33.5)
MCHC RBC AUTO-ENTMCNC: 33.6 G/DL (ref 28.4–34.8)
MCV RBC AUTO: 89.1 FL (ref 82.6–102.9)
NRBC BLD-RTO: 0 PER 100 WBC
PLATELET # BLD AUTO: 296 K/UL (ref 138–453)
PMV BLD AUTO: 10.9 FL (ref 8.1–13.5)
POTASSIUM SERPL-SCNC: 4.1 MMOL/L (ref 3.7–5.3)
PROT SERPL-MCNC: 7.5 G/DL (ref 6.6–8.7)
RBC # BLD AUTO: 5.34 M/UL (ref 3.95–5.11)
SODIUM SERPL-SCNC: 138 MMOL/L (ref 136–145)
TRIGL SERPL-MCNC: 216 MG/DL (ref 0–149)
TSH SERPL DL<=0.05 MIU/L-ACNC: 2.8 UIU/ML (ref 0.27–4.2)
VLDLC SERPL CALC-MCNC: 43 MG/DL
WBC OTHER # BLD: 11.3 K/UL (ref 3.5–11.3)

## 2023-12-28 ENCOUNTER — APPOINTMENT (OUTPATIENT)
Dept: CT IMAGING | Age: 63
End: 2023-12-28
Payer: MEDICARE

## 2023-12-28 ENCOUNTER — APPOINTMENT (OUTPATIENT)
Dept: GENERAL RADIOLOGY | Age: 63
End: 2023-12-28
Payer: MEDICARE

## 2023-12-28 ENCOUNTER — HOSPITAL ENCOUNTER (EMERGENCY)
Age: 63
Discharge: HOME | End: 2023-12-28
Attending: EMERGENCY MEDICINE
Payer: MEDICARE

## 2023-12-28 VITALS
HEIGHT: 66 IN | WEIGHT: 195 LBS | OXYGEN SATURATION: 95 % | DIASTOLIC BLOOD PRESSURE: 85 MMHG | RESPIRATION RATE: 22 BRPM | TEMPERATURE: 98.3 F | SYSTOLIC BLOOD PRESSURE: 125 MMHG | BODY MASS INDEX: 31.34 KG/M2 | HEART RATE: 101 BPM

## 2023-12-28 DIAGNOSIS — R53.1 GENERALIZED WEAKNESS: Primary | ICD-10-CM

## 2023-12-28 LAB
ALBUMIN SERPL-MCNC: 4.6 G/DL (ref 3.5–5.2)
ALBUMIN/GLOB SERPL: 1.5 {RATIO} (ref 1–2.5)
ALP SERPL-CCNC: 138 U/L (ref 35–104)
ALT SERPL-CCNC: 63 U/L (ref 5–33)
ANION GAP SERPL CALCULATED.3IONS-SCNC: 18 MMOL/L (ref 9–17)
AST SERPL-CCNC: 44 U/L
BASOPHILS # BLD: 0.08 K/UL (ref 0–0.2)
BASOPHILS NFR BLD: 1 % (ref 0–2)
BILIRUB SERPL-MCNC: 0.6 MG/DL (ref 0.3–1.2)
BILIRUB UR QL STRIP: NEGATIVE
BUN SERPL-MCNC: 15 MG/DL (ref 8–23)
BUN/CREAT SERPL: 17 (ref 9–20)
CALCIUM SERPL-MCNC: 9.6 MG/DL (ref 8.6–10.4)
CHLORIDE SERPL-SCNC: 88 MMOL/L (ref 98–107)
CLARITY UR: CLEAR
CO2 SERPL-SCNC: 23 MMOL/L (ref 20–31)
COLOR UR: YELLOW
COMMENT: ABNORMAL
CREAT SERPL-MCNC: 0.9 MG/DL (ref 0.5–0.9)
D DIMER PPP FEU-MCNC: 1.85 UG/ML FEU (ref 0–0.59)
EKG ATRIAL RATE: 117 BPM
EKG P AXIS: 59 DEGREES
EKG P-R INTERVAL: 176 MS
EKG Q-T INTERVAL: 322 MS
EKG QRS DURATION: 72 MS
EKG QTC CALCULATION (BAZETT): 449 MS
EKG R AXIS: -53 DEGREES
EKG T AXIS: 49 DEGREES
EKG VENTRICULAR RATE: 117 BPM
EOSINOPHIL # BLD: 0.06 K/UL (ref 0–0.44)
EOSINOPHILS RELATIVE PERCENT: 1 % (ref 1–4)
ERYTHROCYTE [DISTWIDTH] IN BLOOD BY AUTOMATED COUNT: 13.1 % (ref 11.8–14.4)
GFR SERPL CREATININE-BSD FRML MDRD: >60 ML/MIN/1.73M2
GLUCOSE BLD-MCNC: 209 MG/DL (ref 65–105)
GLUCOSE SERPL-MCNC: 213 MG/DL (ref 70–99)
GLUCOSE UR STRIP-MCNC: ABNORMAL MG/DL
HCT VFR BLD AUTO: 45.8 % (ref 36.3–47.1)
HGB BLD-MCNC: 16.2 G/DL (ref 11.9–15.1)
HGB UR QL STRIP.AUTO: NEGATIVE
IMM GRANULOCYTES # BLD AUTO: 0.04 K/UL (ref 0–0.3)
IMM GRANULOCYTES NFR BLD: 0 %
KETONES UR STRIP-MCNC: NEGATIVE MG/DL
LACTATE BLDV-SCNC: 1.6 MMOL/L (ref 0.5–1.9)
LACTATE BLDV-SCNC: 2.4 MMOL/L (ref 0.5–1.9)
LEUKOCYTE ESTERASE UR QL STRIP: NEGATIVE
LYMPHOCYTES NFR BLD: 1.73 K/UL (ref 1.1–3.7)
LYMPHOCYTES RELATIVE PERCENT: 14 % (ref 24–43)
MAGNESIUM SERPL-MCNC: 1.8 MG/DL (ref 1.6–2.6)
MCH RBC QN AUTO: 29.9 PG (ref 25.2–33.5)
MCHC RBC AUTO-ENTMCNC: 35.4 G/DL (ref 25.2–33.5)
MCV RBC AUTO: 84.5 FL (ref 82.6–102.9)
MONOCYTES NFR BLD: 0.58 K/UL (ref 0.1–1.2)
MONOCYTES NFR BLD: 5 % (ref 3–12)
NEUTROPHILS NFR BLD: 79 % (ref 36–65)
NEUTS SEG NFR BLD: 9.72 K/UL (ref 1.5–8.1)
NITRITE UR QL STRIP: NEGATIVE
NRBC BLD-RTO: 0 PER 100 WBC
PH UR STRIP: 6 [PH] (ref 5–6)
PLATELET # BLD AUTO: 291 K/UL (ref 138–453)
PMV BLD AUTO: 9.7 FL (ref 8.1–13.5)
POTASSIUM SERPL-SCNC: 3.4 MMOL/L (ref 3.7–5.3)
PROT SERPL-MCNC: 7.7 G/DL (ref 6.4–8.3)
PROT UR STRIP-MCNC: NEGATIVE MG/DL
RBC # BLD AUTO: 5.42 M/UL (ref 3.95–5.11)
SARS-COV-2 RDRP RESP QL NAA+PROBE: NOT DETECTED
SODIUM SERPL-SCNC: 129 MMOL/L (ref 135–144)
SP GR UR STRIP: 1 (ref 1.01–1.02)
SPECIMEN DESCRIPTION: NORMAL
TROPONIN I SERPL HS-MCNC: 15 NG/L (ref 0–14)
UROBILINOGEN UR STRIP-ACNC: NORMAL EU/DL (ref 0–1)
WBC OTHER # BLD: 12.2 K/UL (ref 3.5–11.3)

## 2023-12-28 PROCEDURE — 85379 FIBRIN DEGRADATION QUANT: CPT

## 2023-12-28 PROCEDURE — 6360000004 HC RX CONTRAST MEDICATION: Performed by: EMERGENCY MEDICINE

## 2023-12-28 PROCEDURE — 74174 CTA ABD&PLVS W/CONTRAST: CPT

## 2023-12-28 PROCEDURE — 83735 ASSAY OF MAGNESIUM: CPT

## 2023-12-28 PROCEDURE — 81003 URINALYSIS AUTO W/O SCOPE: CPT

## 2023-12-28 PROCEDURE — 36415 COLL VENOUS BLD VENIPUNCTURE: CPT

## 2023-12-28 PROCEDURE — 80053 COMPREHEN METABOLIC PANEL: CPT

## 2023-12-28 PROCEDURE — 87635 SARS-COV-2 COVID-19 AMP PRB: CPT

## 2023-12-28 PROCEDURE — 99285 EMERGENCY DEPT VISIT HI MDM: CPT

## 2023-12-28 PROCEDURE — 87040 BLOOD CULTURE FOR BACTERIA: CPT

## 2023-12-28 PROCEDURE — 72125 CT NECK SPINE W/O DYE: CPT

## 2023-12-28 PROCEDURE — 84484 ASSAY OF TROPONIN QUANT: CPT

## 2023-12-28 PROCEDURE — 85025 COMPLETE CBC W/AUTO DIFF WBC: CPT

## 2023-12-28 PROCEDURE — 83605 ASSAY OF LACTIC ACID: CPT

## 2023-12-28 PROCEDURE — 93005 ELECTROCARDIOGRAM TRACING: CPT | Performed by: EMERGENCY MEDICINE

## 2023-12-28 PROCEDURE — 71045 X-RAY EXAM CHEST 1 VIEW: CPT

## 2023-12-28 PROCEDURE — 70450 CT HEAD/BRAIN W/O DYE: CPT

## 2023-12-28 PROCEDURE — 82947 ASSAY GLUCOSE BLOOD QUANT: CPT

## 2023-12-28 RX ADMIN — IOPAMIDOL 100 ML: 755 INJECTION, SOLUTION INTRAVENOUS at 15:55

## 2023-12-31 LAB
MICROORGANISM SPEC CULT: NORMAL
MICROORGANISM SPEC CULT: NORMAL
SERVICE CMNT-IMP: NORMAL
SERVICE CMNT-IMP: NORMAL
SPECIMEN DESCRIPTION: NORMAL
SPECIMEN DESCRIPTION: NORMAL

## 2024-01-02 ENCOUNTER — OUTSIDE SERVICES (OUTPATIENT)
Dept: FAMILY MEDICINE CLINIC | Age: 64
End: 2024-01-02
Payer: MEDICARE

## 2024-01-02 DIAGNOSIS — E44.0 MODERATE MALNUTRITION (HCC): ICD-10-CM

## 2024-01-02 DIAGNOSIS — Z79.4 CONTROLLED TYPE 2 DIABETES MELLITUS WITHOUT COMPLICATION, WITH LONG-TERM CURRENT USE OF INSULIN (HCC): ICD-10-CM

## 2024-01-02 DIAGNOSIS — F20.9 SCHIZOPHRENIA, UNSPECIFIED TYPE (HCC): ICD-10-CM

## 2024-01-02 DIAGNOSIS — R53.1 GENERALIZED WEAKNESS: Primary | ICD-10-CM

## 2024-01-02 DIAGNOSIS — E11.9 CONTROLLED TYPE 2 DIABETES MELLITUS WITHOUT COMPLICATION, WITH LONG-TERM CURRENT USE OF INSULIN (HCC): ICD-10-CM

## 2024-01-02 PROCEDURE — 99305 1ST NF CARE MODERATE MDM 35: CPT | Performed by: FAMILY MEDICINE

## 2024-01-03 ENCOUNTER — TELEPHONE (OUTPATIENT)
Dept: FAMILY MEDICINE CLINIC | Age: 64
End: 2024-01-03

## 2024-01-03 RX ORDER — SENNOSIDES 8.6 MG
650 CAPSULE ORAL EVERY 8 HOURS PRN
COMMUNITY

## 2024-01-08 DIAGNOSIS — U07.1 COVID-19: Primary | ICD-10-CM

## 2024-01-09 ENCOUNTER — OUTSIDE SERVICES (OUTPATIENT)
Dept: FAMILY MEDICINE CLINIC | Age: 64
End: 2024-01-09
Payer: MEDICARE

## 2024-01-09 DIAGNOSIS — E11.9 CONTROLLED TYPE 2 DIABETES MELLITUS WITHOUT COMPLICATION, WITH LONG-TERM CURRENT USE OF INSULIN (HCC): ICD-10-CM

## 2024-01-09 DIAGNOSIS — U07.1 COVID-19: Primary | ICD-10-CM

## 2024-01-09 DIAGNOSIS — Z79.4 CONTROLLED TYPE 2 DIABETES MELLITUS WITHOUT COMPLICATION, WITH LONG-TERM CURRENT USE OF INSULIN (HCC): ICD-10-CM

## 2024-01-09 DIAGNOSIS — R53.1 GENERALIZED WEAKNESS: ICD-10-CM

## 2024-01-09 PROCEDURE — 99309 SBSQ NF CARE MODERATE MDM 30: CPT | Performed by: FAMILY MEDICINE

## 2024-01-09 ASSESSMENT — ENCOUNTER SYMPTOMS
WHEEZING: 0
SHORTNESS OF BREATH: 0
CHEST TIGHTNESS: 0
DIARRHEA: 0
ABDOMINAL PAIN: 0
CONSTIPATION: 0
COUGH: 0

## 2024-01-09 NOTE — PROGRESS NOTES
Positive for confusion and decreased concentration. Negative for behavioral problems, dysphoric mood and sleep disturbance. The patient is not nervous/anxious.        Objective:     Physical Exam  Vitals and nursing note reviewed.   Constitutional:       General: She is not in acute distress.     Appearance: She is well-developed.   Eyes:      Conjunctiva/sclera: Conjunctivae normal.   Neck:      Thyroid: No thyromegaly.   Cardiovascular:      Rate and Rhythm: Normal rate and regular rhythm.      Heart sounds: Normal heart sounds. No murmur heard.  Pulmonary:      Effort: Pulmonary effort is normal. No respiratory distress.      Breath sounds: Normal breath sounds. No wheezing.   Abdominal:      General: Abdomen is flat. Bowel sounds are normal.      Palpations: Abdomen is soft.      Tenderness: There is no abdominal tenderness. There is no guarding or rebound.   Musculoskeletal:      Cervical back: Normal range of motion and neck supple.   Lymphadenopathy:      Cervical: No cervical adenopathy.   Skin:     General: Skin is warm and dry.      Findings: No erythema or rash.   Neurological:      General: No focal deficit present.      Mental Status: She is alert. Mental status is at baseline.   Psychiatric:         Mood and Affect: Mood normal.         Speech: Speech normal.         Behavior: Behavior normal.         Cognition and Memory: Memory is impaired.           Assessment:       Diagnosis Orders   1. Generalized weakness        2. Controlled type 2 diabetes mellitus without complication, with long-term current use of insulin ([facility])        3. Moderate malnutrition (HCC)        4. Schizophrenia, unspecified type ([facility])                  Plan:   Generalized weakness. Stable. Patient is already improving since getting to [place] and starting therapy. Patient will continue therapy and hopefully be able to go back home within the next couple of weeks. She seems very motivated and it seems that she has been able to do

## 2024-01-12 VITALS — WEIGHT: 199 LBS | BODY MASS INDEX: 31.98 KG/M2 | HEIGHT: 66 IN

## 2024-01-12 NOTE — PROGRESS NOTES
Outside order received for CT lung screening.  EMR and order reviewed.  Information regarding ct lung screening and smoking cessation referral mailed to patient.

## 2024-01-23 ASSESSMENT — ENCOUNTER SYMPTOMS
COUGH: 0
WHEEZING: 0
SHORTNESS OF BREATH: 0
CONSTIPATION: 0
CHEST TIGHTNESS: 0
DIARRHEA: 0
ABDOMINAL PAIN: 0

## 2024-01-23 NOTE — PROGRESS NOTES
metoprolol tartrate (LOPRESSOR) 25 MG tablet Take 1 tablet by mouth 2 times daily  Sara Santos MD   metFORMIN (GLUCOPHAGE) 1000 MG tablet Take 1 tablet by mouth 2 times daily (with meals)  Sara Santos MD   lactulose (CHRONULAC) 10 GM/15ML solution Take 15 mLs by mouth 3 times daily  Ford Wiseman MD   melatonin 3 MG TABS tablet Take 5 mg by mouth nightly as needed  Provider, MD Victorina   diphenhydrAMINE (BENADRYL) 25 MG capsule Take 2 capsules by mouth every 6 hours as needed for Itching  Alka Noe MD     Allergies   Allergen Reactions    Bicillin [Penicillin G Benzathine] Shortness Of Breath    Penicillins      Other reaction(s): PENICILLINS    Ziprasidone Other (See Comments)     \"I can't sit still\"  Pt could not sit still, hyper         Subjective:      Review of Systems   Constitutional:  Negative for activity change, appetite change, chills, fatigue and fever.   Respiratory:  Negative for cough, chest tightness, shortness of breath and wheezing.    Cardiovascular:  Negative for chest pain, palpitations and leg swelling.   Gastrointestinal:  Negative for abdominal pain, constipation and diarrhea.   Genitourinary:  Negative for difficulty urinating.   Skin:  Negative for rash.   Neurological:  Negative for dizziness, syncope, weakness, light-headedness and headaches.   Psychiatric/Behavioral:  Positive for confusion and decreased concentration. Negative for behavioral problems, dysphoric mood and sleep disturbance. The patient is not nervous/anxious.        Objective:     Physical Exam  Vitals and nursing note reviewed.   Constitutional:       General: She is not in acute distress.     Appearance: She is well-developed.   Eyes:      Conjunctiva/sclera: Conjunctivae normal.   Neck:      Thyroid: No thyromegaly.   Cardiovascular:      Rate and Rhythm: Normal rate and regular rhythm.      Heart sounds: Normal heart sounds. No murmur heard.  Pulmonary:      Effort: Pulmonary effort is normal.

## 2024-02-13 ENCOUNTER — OFFICE VISIT (OUTPATIENT)
Dept: PODIATRY | Age: 64
End: 2024-02-13
Payer: MEDICARE

## 2024-02-13 VITALS
WEIGHT: 195 LBS | DIASTOLIC BLOOD PRESSURE: 82 MMHG | BODY MASS INDEX: 31.34 KG/M2 | HEART RATE: 76 BPM | HEIGHT: 66 IN | SYSTOLIC BLOOD PRESSURE: 138 MMHG

## 2024-02-13 DIAGNOSIS — E11.51 CONTROLLED TYPE 2 DM WITH PERIPHERAL CIRCULATORY DISORDER (HCC): Primary | ICD-10-CM

## 2024-02-13 DIAGNOSIS — B35.1 DERMATOPHYTOSIS OF NAIL: ICD-10-CM

## 2024-02-13 DIAGNOSIS — E13.59 OTHER SPECIFIED DIABETES MELLITUS WITH OTHER CIRCULATORY COMPLICATIONS (HCC): ICD-10-CM

## 2024-02-13 PROCEDURE — 11720 DEBRIDE NAIL 1-5: CPT | Performed by: PODIATRIST

## 2024-02-13 PROCEDURE — 99999 PR OFFICE/OUTPT VISIT,PROCEDURE ONLY: CPT | Performed by: PODIATRIST

## 2024-02-13 RX ORDER — DAPAGLIFLOZIN 10 MG/1
10 TABLET, FILM COATED ORAL EVERY MORNING
COMMUNITY
Start: 2024-02-08

## 2024-02-13 RX ORDER — BENZTROPINE MESYLATE 1 MG/1
1 TABLET ORAL 2 TIMES DAILY
COMMUNITY
Start: 2024-02-08

## 2024-02-13 RX ORDER — ROSUVASTATIN CALCIUM 20 MG/1
TABLET, COATED ORAL
COMMUNITY
Start: 2024-02-08

## 2024-02-13 NOTE — PROGRESS NOTES
Foot Care Worksheet  PCP: Zoila Evans  Last visit: 2/7/24      Nail description: Thick , Yellow , Crumbly , Marked limitation of ambulation     Pain resulting from thickened and dystrophy of nail plate No    Nails involved  Right   1  (T5-T9)  Left    1, 2  (TA-T4)    Q7 1 Class A Finding - Non traumatic amputation of foot No    Q8 2 Class B Findings - Absent DP pulse No, Absent PT pulse No, Advanced tropic changes (3 required) Yes    Decrease hair growth Yes, Nail changes/thickening Yes, Pigmented changes/discoloration Yes, Skin texture (thin, shiny) No, Skin color (rubor/redness) No    Q9 1 Class B and 2 Class C Findings  Claudication No, Temperature change Yes, Paresthesia No, Burning No, Edema Yes  Subjective:  Patient presents to [facility] today for routine foot care.  Patient denies any new problems with their feet.  Allergies   Allergen Reactions    Bicillin [Penicillin G Benzathine] Shortness Of Breath    Penicillins      Other reaction(s): PENICILLINS    Ziprasidone Other (See Comments)     \"I can't sit still\"  Pt could not sit still, hyper         Past Medical History:   Diagnosis Date    Depression     Diabetes mellitus (HCC)     Hyperlipidemia     Hypertension     Thyroid disease     Hypothyroid       Prior to Admission medications    Medication Sig Start Date End Date Taking? Authorizing Provider   benztropine (COGENTIN) 1 MG tablet Take 1 tablet by mouth 2 times daily 2/8/24  Yes ProviderVictorina MD   rosuvastatin (CRESTOR) 20 MG tablet TAKE 1 TABLET DAILY IN THE EVENING (replaces pravastatin) 2/8/24  Yes Victorina Hernandez MD   FARXIGA 10 MG tablet Take 1 tablet by mouth every morning 2/8/24  Yes Victorina Hernandez MD   acetaminophen (TYLENOL 8 HOUR) 650 MG extended release tablet Take 1 tablet by mouth every 8 hours as needed for Pain   Yes ProviderVictorina MD   betamethasone dipropionate 0.05 % cream APPLY TO PALM TO RASH TWICE DAILY 7/6/22  Yes Victorina Hernandez

## 2024-02-14 ENCOUNTER — HOSPITAL ENCOUNTER (OUTPATIENT)
Dept: ULTRASOUND IMAGING | Age: 64
Discharge: HOME | End: 2024-02-16
Payer: MEDICARE

## 2024-02-14 ENCOUNTER — HOSPITAL ENCOUNTER (OUTPATIENT)
Dept: CT IMAGING | Age: 64
Discharge: HOME | End: 2024-02-16
Payer: MEDICARE

## 2024-02-14 DIAGNOSIS — K76.0 FATTY (CHANGE OF) LIVER, NOT ELSEWHERE CLASSIFIED: ICD-10-CM

## 2024-02-14 DIAGNOSIS — F17.200 NICOTINE DEPENDENCE, UNCOMPLICATED, UNSPECIFIED NICOTINE PRODUCT TYPE: ICD-10-CM

## 2024-02-14 PROCEDURE — 76705 ECHO EXAM OF ABDOMEN: CPT

## 2024-02-14 PROCEDURE — 71271 CT THORAX LUNG CANCER SCR C-: CPT

## 2024-03-14 ENCOUNTER — HOSPITAL ENCOUNTER (OUTPATIENT)
Dept: INTERVENTIONAL RADIOLOGY/VASCULAR | Age: 64
Discharge: HOME | End: 2024-03-16
Payer: MEDICARE

## 2024-03-14 ENCOUNTER — HOSPITAL ENCOUNTER (OUTPATIENT)
Dept: GENERAL RADIOLOGY | Age: 64
Discharge: HOME | End: 2024-03-16
Payer: MEDICARE

## 2024-03-14 DIAGNOSIS — R22.42 MASS OF LOWER LEG, LEFT: ICD-10-CM

## 2024-03-14 DIAGNOSIS — R22.42 LOCALIZED SWELLING, MASS AND LUMP, LOWER LIMB, LEFT: ICD-10-CM

## 2024-03-14 PROCEDURE — 93971 EXTREMITY STUDY: CPT

## 2024-03-14 PROCEDURE — 73590 X-RAY EXAM OF LOWER LEG: CPT

## 2024-05-02 ENCOUNTER — OFFICE VISIT (OUTPATIENT)
Dept: PODIATRY | Age: 64
End: 2024-05-02
Payer: MEDICARE

## 2024-05-02 VITALS
SYSTOLIC BLOOD PRESSURE: 128 MMHG | RESPIRATION RATE: 20 BRPM | BODY MASS INDEX: 31.4 KG/M2 | HEART RATE: 76 BPM | DIASTOLIC BLOOD PRESSURE: 64 MMHG | WEIGHT: 191.6 LBS

## 2024-05-02 DIAGNOSIS — E11.51 CONTROLLED TYPE 2 DM WITH PERIPHERAL CIRCULATORY DISORDER (HCC): Primary | ICD-10-CM

## 2024-05-02 DIAGNOSIS — B35.1 DERMATOPHYTOSIS OF NAIL: ICD-10-CM

## 2024-05-02 PROCEDURE — 99999 PR OFFICE/OUTPT VISIT,PROCEDURE ONLY: CPT | Performed by: PODIATRIST

## 2024-05-02 PROCEDURE — 11720 DEBRIDE NAIL 1-5: CPT | Performed by: PODIATRIST

## 2024-05-02 RX ORDER — CHLORTHALIDONE 25 MG/1
TABLET ORAL
COMMUNITY
Start: 2024-04-15

## 2024-05-02 NOTE — PROGRESS NOTES
Foot Care Worksheet  PCP: Zoila Evans APRN - NP  Last visit: 02 / 07 / 2024      Nail description: Thick , Yellow , Crumbly , Marked limitation of ambulation     Pain resulting from thickened and dystrophy of nail plate No    Nails involved  Right   1  (T5-T9)  Left    1, 2  (TA-T4)    Q7 1 Class A Finding - Non traumatic amputation of foot No    Q8 2 Class B Findings - Absent DP pulse No, Absent PT pulse No, Advanced tropic changes (3 required) Yes    Decrease hair growth Yes, Nail changes/thickening Yes, Pigmented changes/discoloration Yes, Skin texture (thin, shiny) No, Skin color (rubor/redness) No    Q9 1 Class B and 2 Class C Findings  Claudication No, Temperature change Yes, Paresthesia No, Burning No, Edema Yes    Subjective:  Patient presents to [facility] today for routine foot care.  Patient denies any new problems with their feet.  Allergies   Allergen Reactions    Bicillin [Penicillin G Benzathine] Shortness Of Breath    Penicillins      Other reaction(s): PENICILLINS    Ziprasidone Other (See Comments)     \"I can't sit still\"  Pt could not sit still, hyper         Past Medical History:   Diagnosis Date    Depression     Diabetes mellitus (HCC)     Hyperlipidemia     Hypertension     Thyroid disease     Hypothyroid       Prior to Admission medications    Medication Sig Start Date End Date Taking? Authorizing Provider   chlorthalidone (HYGROTON) 25 MG tablet TAKE 1 TABLET BY MOUTH ONCE DAILY AS NEEDED FOR swelling 4/15/24  Yes Provider, MD Victorina   benztropine (COGENTIN) 1 MG tablet Take 1 tablet by mouth 2 times daily 2/8/24  Yes Provider, MD Victorina   rosuvastatin (CRESTOR) 20 MG tablet TAKE 1 TABLET DAILY IN THE EVENING (replaces pravastatin) 2/8/24  Yes Provider, MD Victorina   FARXIGA 10 MG tablet Take 1 tablet by mouth every morning 2/8/24  Yes Provider, MD Victorina   acetaminophen (TYLENOL 8 HOUR) 650 MG extended release tablet Take 1 tablet by mouth every 8 hours as

## 2024-07-18 ENCOUNTER — OFFICE VISIT (OUTPATIENT)
Dept: PODIATRY | Age: 64
End: 2024-07-18
Payer: MEDICARE

## 2024-07-18 VITALS
RESPIRATION RATE: 20 BRPM | WEIGHT: 182.4 LBS | SYSTOLIC BLOOD PRESSURE: 126 MMHG | DIASTOLIC BLOOD PRESSURE: 64 MMHG | BODY MASS INDEX: 29.89 KG/M2 | HEART RATE: 72 BPM

## 2024-07-18 DIAGNOSIS — E11.51 CONTROLLED TYPE 2 DM WITH PERIPHERAL CIRCULATORY DISORDER (HCC): Primary | ICD-10-CM

## 2024-07-18 DIAGNOSIS — B35.1 DERMATOPHYTOSIS OF NAIL: ICD-10-CM

## 2024-07-18 PROCEDURE — 11720 DEBRIDE NAIL 1-5: CPT | Performed by: PODIATRIST

## 2024-07-18 NOTE — PROGRESS NOTES
Foot Care Worksheet  PCP: Zoila Evans APRN - NP  Last visit: 05 / 15 / 2024      Nail description: Thick , Yellow , Crumbly , Marked limitation of ambulation     Pain resulting from thickened and dystrophy of nail plate No    Nails involved  Right   1  (T5-T9)  Left    1, 2  (TA-T4)    Q7 1 Class A Finding - Non traumatic amputation of foot No    Q8 2 Class B Findings - Absent DP pulse No, Absent PT pulse No, Advanced tropic changes (3 required) Yes    Decrease hair growth Yes, Nail changes/thickening Yes, Pigmented changes/discoloration Yes, Skin texture (thin, shiny) No, Skin color (rubor/redness) No    Q9 1 Class B and 2 Class C Findings  Claudication No, Temperature change Yes, Paresthesia No, Burning No, Edema Yes    Subjective:  Patient presents to [facility] today for routine foot care.  Patient denies any new problems with their feet.  Allergies   Allergen Reactions    Bicillin [Penicillin G Benzathine] Shortness Of Breath    Penicillins      Other reaction(s): PENICILLINS    Ziprasidone Other (See Comments)     \"I can't sit still\"  Pt could not sit still, hyper         Past Medical History:   Diagnosis Date    Depression     Diabetes mellitus (HCC)     Hyperlipidemia     Hypertension     Thyroid disease     Hypothyroid       Prior to Admission medications    Medication Sig Start Date End Date Taking? Authorizing Provider   chlorthalidone (HYGROTON) 25 MG tablet TAKE 1 TABLET BY MOUTH ONCE DAILY AS NEEDED FOR swelling 4/15/24  Yes Provider, MD Victorina   benztropine (COGENTIN) 1 MG tablet Take 1 tablet by mouth 2 times daily 2/8/24  Yes Provider, MD Victorina   rosuvastatin (CRESTOR) 20 MG tablet TAKE 1 TABLET DAILY IN THE EVENING (replaces pravastatin) 2/8/24  Yes Provider, MD Victorina   FARXIGA 10 MG tablet Take 1 tablet by mouth every morning 2/8/24  Yes Provider, MD Victorina   acetaminophen (TYLENOL 8 HOUR) 650 MG extended release tablet Take 1 tablet by mouth every 8 hours as

## 2024-10-01 ENCOUNTER — OFFICE VISIT (OUTPATIENT)
Dept: PODIATRY | Age: 64
End: 2024-10-01
Payer: MEDICARE

## 2024-10-01 VITALS
HEART RATE: 81 BPM | WEIGHT: 170 LBS | HEIGHT: 66 IN | SYSTOLIC BLOOD PRESSURE: 130 MMHG | DIASTOLIC BLOOD PRESSURE: 74 MMHG | BODY MASS INDEX: 27.32 KG/M2

## 2024-10-01 DIAGNOSIS — B35.1 DERMATOPHYTOSIS OF NAIL: ICD-10-CM

## 2024-10-01 DIAGNOSIS — E11.51 CONTROLLED TYPE 2 DM WITH PERIPHERAL CIRCULATORY DISORDER (HCC): Primary | ICD-10-CM

## 2024-10-01 PROCEDURE — 99999 PR OFFICE/OUTPT VISIT,PROCEDURE ONLY: CPT | Performed by: PODIATRIST

## 2024-10-01 PROCEDURE — 11720 DEBRIDE NAIL 1-5: CPT | Performed by: PODIATRIST

## 2024-10-01 NOTE — PROGRESS NOTES
Foot Care Worksheet  PCP: Zoila Evans APRN - NP  Last visit: 05 / 15 / 2024      Nail description: Thick , Yellow , Crumbly , Marked limitation of ambulation     Pain resulting from thickened and dystrophy of nail plate No    Nails involved  Right   1  (T5-T9)  Left    1, 2  (TA-T4)    Q7 1 Class A Finding - Non traumatic amputation of foot No    Q8 2 Class B Findings - Absent DP pulse No, Absent PT pulse No, Advanced tropic changes (3 required) Yes    Decrease hair growth Yes, Nail changes/thickening Yes, Pigmented changes/discoloration Yes, Skin texture (thin, shiny) No, Skin color (rubor/redness) No    Q9 1 Class B and 2 Class C Findings  Claudication No, Temperature change Yes, Paresthesia No, Burning No, Edema Yes    Subjective:  Patient presents to [facility] today for routine foot care.  Patient denies any new problems with their feet.  Allergies   Allergen Reactions    Bicillin [Penicillin G Benzathine] Shortness Of Breath    Penicillins      Other reaction(s): PENICILLINS    Ziprasidone Other (See Comments)     \"I can't sit still\"  Pt could not sit still, hyper         Past Medical History:   Diagnosis Date    Depression     Diabetes mellitus (HCC)     Hyperlipidemia     Hypertension     Thyroid disease     Hypothyroid       Prior to Admission medications    Medication Sig Start Date End Date Taking? Authorizing Provider   chlorthalidone (HYGROTON) 25 MG tablet TAKE 1 TABLET BY MOUTH ONCE DAILY AS NEEDED FOR swelling 4/15/24  Yes Provider, MD Victorina   benztropine (COGENTIN) 1 MG tablet Take 1 tablet by mouth 2 times daily 2/8/24  Yes Provider, MD Victorina   rosuvastatin (CRESTOR) 20 MG tablet TAKE 1 TABLET DAILY IN THE EVENING (replaces pravastatin) 2/8/24  Yes Provider, MD Victorina   FARXIGA 10 MG tablet Take 1 tablet by mouth every morning 2/8/24  Yes Provider, MD Victorina   acetaminophen (TYLENOL 8 HOUR) 650 MG extended release tablet Take 1 tablet by mouth every 8 hours as

## 2024-12-20 ENCOUNTER — HOSPITAL ENCOUNTER (INPATIENT)
Age: 64
LOS: 3 days | Discharge: HOME | DRG: 641 | End: 2024-12-23
Attending: STUDENT IN AN ORGANIZED HEALTH CARE EDUCATION/TRAINING PROGRAM | Admitting: INTERNAL MEDICINE
Payer: MEDICARE

## 2024-12-20 ENCOUNTER — APPOINTMENT (OUTPATIENT)
Dept: CT IMAGING | Age: 64
DRG: 641 | End: 2024-12-20
Payer: MEDICARE

## 2024-12-20 DIAGNOSIS — E87.6 HYPOKALEMIA: ICD-10-CM

## 2024-12-20 DIAGNOSIS — R33.9 URINARY RETENTION: ICD-10-CM

## 2024-12-20 DIAGNOSIS — E87.1 HYPONATREMIA: Primary | ICD-10-CM

## 2024-12-20 LAB
ALBUMIN SERPL-MCNC: 4.1 G/DL (ref 3.5–5.2)
ALBUMIN/GLOB SERPL: 1.3 {RATIO} (ref 1–2.5)
ALP SERPL-CCNC: 125 U/L (ref 35–104)
ALT SERPL-CCNC: 10 U/L (ref 5–33)
ANION GAP SERPL CALCULATED.3IONS-SCNC: 17 MMOL/L (ref 9–17)
ANION GAP SERPL CALCULATED.3IONS-SCNC: 17 MMOL/L (ref 9–17)
ANION GAP SERPL CALCULATED.3IONS-SCNC: 20 MMOL/L (ref 9–17)
AST SERPL-CCNC: 18 U/L
BASOPHILS # BLD: 0.04 K/UL (ref 0–0.2)
BASOPHILS NFR BLD: 1 % (ref 0–2)
BILIRUB SERPL-MCNC: 0.5 MG/DL (ref 0.3–1.2)
BILIRUB UR QL STRIP: NEGATIVE
BUN SERPL-MCNC: 6 MG/DL (ref 8–23)
BUN SERPL-MCNC: 6 MG/DL (ref 8–23)
BUN SERPL-MCNC: 7 MG/DL (ref 8–23)
BUN/CREAT SERPL: 8 (ref 9–20)
BUN/CREAT SERPL: 8 (ref 9–20)
BUN/CREAT SERPL: 9 (ref 9–20)
CALCIUM SERPL-MCNC: 9.6 MG/DL (ref 8.6–10.4)
CALCIUM SERPL-MCNC: 9.8 MG/DL (ref 8.6–10.4)
CALCIUM SERPL-MCNC: 9.8 MG/DL (ref 8.6–10.4)
CHLORIDE SERPL-SCNC: 76 MMOL/L (ref 98–107)
CHLORIDE SERPL-SCNC: 77 MMOL/L (ref 98–107)
CHLORIDE SERPL-SCNC: 78 MMOL/L (ref 98–107)
CLARITY UR: CLEAR
CO2 SERPL-SCNC: 25 MMOL/L (ref 20–31)
CO2 SERPL-SCNC: 27 MMOL/L (ref 20–31)
CO2 SERPL-SCNC: 27 MMOL/L (ref 20–31)
COLOR UR: YELLOW
COMMENT: ABNORMAL
CREAT SERPL-MCNC: 0.7 MG/DL (ref 0.5–0.9)
CREAT SERPL-MCNC: 0.8 MG/DL (ref 0.5–0.9)
CREAT SERPL-MCNC: 0.9 MG/DL (ref 0.5–0.9)
EOSINOPHIL # BLD: 0.07 K/UL (ref 0–0.44)
EOSINOPHILS RELATIVE PERCENT: 1 % (ref 1–4)
ERYTHROCYTE [DISTWIDTH] IN BLOOD BY AUTOMATED COUNT: 12.2 % (ref 11.8–14.4)
GFR, ESTIMATED: 71 ML/MIN/1.73M2
GFR, ESTIMATED: 82 ML/MIN/1.73M2
GFR, ESTIMATED: >90 ML/MIN/1.73M2
GLUCOSE SERPL-MCNC: 171 MG/DL (ref 70–99)
GLUCOSE SERPL-MCNC: 87 MG/DL (ref 70–99)
GLUCOSE SERPL-MCNC: 97 MG/DL (ref 70–99)
GLUCOSE UR STRIP-MCNC: ABNORMAL MG/DL
HCT VFR BLD AUTO: 34 % (ref 36.3–47.1)
HGB BLD-MCNC: 12.3 G/DL (ref 11.9–15.1)
HGB UR QL STRIP.AUTO: NEGATIVE
IMM GRANULOCYTES # BLD AUTO: 0.03 K/UL (ref 0–0.3)
IMM GRANULOCYTES NFR BLD: 0 %
KETONES UR STRIP-MCNC: NEGATIVE MG/DL
LEUKOCYTE ESTERASE UR QL STRIP: NEGATIVE
LYMPHOCYTES NFR BLD: 1.03 K/UL (ref 1.1–3.7)
LYMPHOCYTES RELATIVE PERCENT: 12 % (ref 24–43)
MAGNESIUM SERPL-MCNC: 1.9 MG/DL (ref 1.6–2.6)
MCH RBC QN AUTO: 29.6 PG (ref 25.2–33.5)
MCHC RBC AUTO-ENTMCNC: 36.2 G/DL (ref 25.2–33.5)
MCV RBC AUTO: 81.7 FL (ref 82.6–102.9)
MONOCYTES NFR BLD: 0.54 K/UL (ref 0.1–1.2)
MONOCYTES NFR BLD: 6 % (ref 3–12)
NEUTROPHILS NFR BLD: 80 % (ref 36–65)
NEUTS SEG NFR BLD: 7.06 K/UL (ref 1.5–8.1)
NITRITE UR QL STRIP: NEGATIVE
NRBC BLD-RTO: 0 PER 100 WBC
PH UR STRIP: 7 [PH] (ref 5–6)
PLATELET # BLD AUTO: 212 K/UL (ref 138–453)
PMV BLD AUTO: 8.5 FL (ref 8.1–13.5)
POTASSIUM SERPL-SCNC: 2.9 MMOL/L (ref 3.7–5.3)
POTASSIUM SERPL-SCNC: 3.7 MMOL/L (ref 3.7–5.3)
POTASSIUM SERPL-SCNC: 3.8 MMOL/L (ref 3.7–5.3)
PROT SERPL-MCNC: 7.3 G/DL (ref 6.4–8.3)
PROT UR STRIP-MCNC: NEGATIVE MG/DL
RBC # BLD AUTO: 4.16 M/UL (ref 3.95–5.11)
RBC # BLD: ABNORMAL 10*6/UL
SODIUM SERPL-SCNC: 120 MMOL/L (ref 135–144)
SODIUM SERPL-SCNC: 122 MMOL/L (ref 135–144)
SODIUM SERPL-SCNC: 122 MMOL/L (ref 135–144)
SP GR UR STRIP: 1.01 (ref 1.01–1.02)
UROBILINOGEN UR STRIP-ACNC: NORMAL EU/DL (ref 0–1)
WBC OTHER # BLD: 8.8 K/UL (ref 3.5–11.3)

## 2024-12-20 PROCEDURE — 36415 COLL VENOUS BLD VENIPUNCTURE: CPT

## 2024-12-20 PROCEDURE — 85025 COMPLETE CBC W/AUTO DIFF WBC: CPT

## 2024-12-20 PROCEDURE — 94760 N-INVAS EAR/PLS OXIMETRY 1: CPT

## 2024-12-20 PROCEDURE — 94640 AIRWAY INHALATION TREATMENT: CPT

## 2024-12-20 PROCEDURE — 51702 INSERT TEMP BLADDER CATH: CPT

## 2024-12-20 PROCEDURE — 6370000000 HC RX 637 (ALT 250 FOR IP): Performed by: STUDENT IN AN ORGANIZED HEALTH CARE EDUCATION/TRAINING PROGRAM

## 2024-12-20 PROCEDURE — 81003 URINALYSIS AUTO W/O SCOPE: CPT

## 2024-12-20 PROCEDURE — 80053 COMPREHEN METABOLIC PANEL: CPT

## 2024-12-20 PROCEDURE — 2500000003 HC RX 250 WO HCPCS: Performed by: INTERNAL MEDICINE

## 2024-12-20 PROCEDURE — 2060000000 HC ICU INTERMEDIATE R&B

## 2024-12-20 PROCEDURE — 94761 N-INVAS EAR/PLS OXIMETRY MLT: CPT

## 2024-12-20 PROCEDURE — 99285 EMERGENCY DEPT VISIT HI MDM: CPT

## 2024-12-20 PROCEDURE — 83735 ASSAY OF MAGNESIUM: CPT

## 2024-12-20 PROCEDURE — 74176 CT ABD & PELVIS W/O CONTRAST: CPT

## 2024-12-20 PROCEDURE — 6370000000 HC RX 637 (ALT 250 FOR IP): Performed by: INTERNAL MEDICINE

## 2024-12-20 PROCEDURE — 80048 BASIC METABOLIC PNL TOTAL CA: CPT

## 2024-12-20 PROCEDURE — 6360000002 HC RX W HCPCS: Performed by: INTERNAL MEDICINE

## 2024-12-20 RX ORDER — LEVOTHYROXINE SODIUM 25 UG/1
25 TABLET ORAL DAILY
Status: DISCONTINUED | OUTPATIENT
Start: 2024-12-21 | End: 2024-12-23 | Stop reason: HOSPADM

## 2024-12-20 RX ORDER — ALBUTEROL SULFATE 0.83 MG/ML
2.5 SOLUTION RESPIRATORY (INHALATION)
Status: DISCONTINUED | OUTPATIENT
Start: 2024-12-20 | End: 2024-12-23 | Stop reason: HOSPADM

## 2024-12-20 RX ORDER — LOSARTAN POTASSIUM 25 MG/1
12.5 TABLET ORAL DAILY
COMMUNITY

## 2024-12-20 RX ORDER — FLUTICASONE PROPIONATE AND SALMETEROL 100; 50 UG/1; UG/1
1 POWDER RESPIRATORY (INHALATION) 2 TIMES DAILY
COMMUNITY
Start: 2024-11-26

## 2024-12-20 RX ORDER — LEVOFLOXACIN 5 MG/ML
500 INJECTION, SOLUTION INTRAVENOUS EVERY 24 HOURS
Status: DISCONTINUED | OUTPATIENT
Start: 2024-12-20 | End: 2024-12-23 | Stop reason: HOSPADM

## 2024-12-20 RX ORDER — SODIUM CHLORIDE FOR INHALATION 0.9 %
3 VIAL, NEBULIZER (ML) INHALATION
Status: DISCONTINUED | OUTPATIENT
Start: 2024-12-20 | End: 2024-12-20

## 2024-12-20 RX ORDER — ROSUVASTATIN CALCIUM 20 MG/1
20 TABLET, COATED ORAL NIGHTLY
Status: DISCONTINUED | OUTPATIENT
Start: 2024-12-20 | End: 2024-12-23 | Stop reason: HOSPADM

## 2024-12-20 RX ORDER — ASPIRIN 81 MG/1
81 TABLET ORAL DAILY
COMMUNITY
Start: 2024-03-15

## 2024-12-20 RX ORDER — BUDESONIDE AND FORMOTEROL FUMARATE DIHYDRATE 80; 4.5 UG/1; UG/1
2 AEROSOL RESPIRATORY (INHALATION)
Status: DISCONTINUED | OUTPATIENT
Start: 2024-12-20 | End: 2024-12-23 | Stop reason: HOSPADM

## 2024-12-20 RX ORDER — LOSARTAN POTASSIUM 25 MG/1
12.5 TABLET ORAL DAILY
Status: DISCONTINUED | OUTPATIENT
Start: 2024-12-21 | End: 2024-12-23 | Stop reason: HOSPADM

## 2024-12-20 RX ORDER — ASPIRIN 81 MG/1
81 TABLET ORAL DAILY
Status: DISCONTINUED | OUTPATIENT
Start: 2024-12-21 | End: 2024-12-23 | Stop reason: HOSPADM

## 2024-12-20 RX ORDER — POLYETHYLENE GLYCOL 3350 17 G/17G
17 POWDER, FOR SOLUTION ORAL DAILY PRN
Status: DISCONTINUED | OUTPATIENT
Start: 2024-12-20 | End: 2024-12-20

## 2024-12-20 RX ORDER — METOPROLOL SUCCINATE 25 MG/1
25 TABLET, EXTENDED RELEASE ORAL DAILY
COMMUNITY
Start: 2024-12-16

## 2024-12-20 RX ORDER — POLYETHYLENE GLYCOL 3350 17 G/17G
17 POWDER, FOR SOLUTION ORAL DAILY
Status: DISCONTINUED | OUTPATIENT
Start: 2024-12-20 | End: 2024-12-21

## 2024-12-20 RX ORDER — IPRATROPIUM BROMIDE AND ALBUTEROL SULFATE 2.5; .5 MG/3ML; MG/3ML
1 SOLUTION RESPIRATORY (INHALATION)
Status: DISCONTINUED | OUTPATIENT
Start: 2024-12-20 | End: 2024-12-20

## 2024-12-20 RX ORDER — ENOXAPARIN SODIUM 100 MG/ML
40 INJECTION SUBCUTANEOUS DAILY
Status: DISCONTINUED | OUTPATIENT
Start: 2024-12-20 | End: 2024-12-23 | Stop reason: HOSPADM

## 2024-12-20 RX ORDER — LOXAPINE SUCCINATE 10 MG/1
10 TABLET ORAL NIGHTLY
Status: DISCONTINUED | OUTPATIENT
Start: 2024-12-20 | End: 2024-12-23 | Stop reason: HOSPADM

## 2024-12-20 RX ORDER — METOPROLOL SUCCINATE 25 MG/1
25 TABLET, EXTENDED RELEASE ORAL DAILY
Status: DISCONTINUED | OUTPATIENT
Start: 2024-12-21 | End: 2024-12-23 | Stop reason: HOSPADM

## 2024-12-20 RX ORDER — MAGNESIUM SULFATE IN WATER 40 MG/ML
2000 INJECTION, SOLUTION INTRAVENOUS PRN
Status: DISCONTINUED | OUTPATIENT
Start: 2024-12-20 | End: 2024-12-23 | Stop reason: HOSPADM

## 2024-12-20 RX ORDER — ACETAMINOPHEN 325 MG/1
650 TABLET ORAL EVERY 4 HOURS PRN
Status: DISCONTINUED | OUTPATIENT
Start: 2024-12-20 | End: 2024-12-23 | Stop reason: HOSPADM

## 2024-12-20 RX ORDER — SITAGLIPTIN 100 MG/1
100 TABLET, FILM COATED ORAL DAILY
COMMUNITY
Start: 2024-12-17

## 2024-12-20 RX ORDER — POTASSIUM CHLORIDE 1500 MG/1
40 TABLET, EXTENDED RELEASE ORAL PRN
Status: DISCONTINUED | OUTPATIENT
Start: 2024-12-20 | End: 2024-12-23 | Stop reason: HOSPADM

## 2024-12-20 RX ORDER — SODIUM CHLORIDE 0.9 % (FLUSH) 0.9 %
10 SYRINGE (ML) INJECTION PRN
Status: DISCONTINUED | OUTPATIENT
Start: 2024-12-20 | End: 2024-12-23 | Stop reason: HOSPADM

## 2024-12-20 RX ORDER — ACETAMINOPHEN 500 MG
500 TABLET ORAL 2 TIMES DAILY
COMMUNITY

## 2024-12-20 RX ORDER — DOCUSATE SODIUM 100 MG/1
100 CAPSULE, LIQUID FILLED ORAL 2 TIMES DAILY PRN
Status: DISCONTINUED | OUTPATIENT
Start: 2024-12-20 | End: 2024-12-23 | Stop reason: HOSPADM

## 2024-12-20 RX ORDER — SODIUM CHLORIDE 9 MG/ML
INJECTION, SOLUTION INTRAVENOUS PRN
Status: DISCONTINUED | OUTPATIENT
Start: 2024-12-20 | End: 2024-12-23 | Stop reason: HOSPADM

## 2024-12-20 RX ORDER — DOCUSATE SODIUM 100 MG/1
100 CAPSULE, LIQUID FILLED ORAL 2 TIMES DAILY PRN
COMMUNITY
Start: 2024-12-17

## 2024-12-20 RX ORDER — GLIPIZIDE 5 MG/1
5 TABLET ORAL
Status: DISCONTINUED | OUTPATIENT
Start: 2024-12-21 | End: 2024-12-23 | Stop reason: HOSPADM

## 2024-12-20 RX ORDER — DIPHENHYDRAMINE HCL 25 MG
50 TABLET ORAL EVERY 6 HOURS PRN
Status: DISCONTINUED | OUTPATIENT
Start: 2024-12-20 | End: 2024-12-23 | Stop reason: HOSPADM

## 2024-12-20 RX ORDER — LACTULOSE 10 G/15ML
10 SOLUTION ORAL 3 TIMES DAILY
Status: DISCONTINUED | OUTPATIENT
Start: 2024-12-20 | End: 2024-12-23 | Stop reason: HOSPADM

## 2024-12-20 RX ORDER — ALBUTEROL SULFATE 90 UG/1
INHALANT RESPIRATORY (INHALATION) EVERY 4 HOURS PRN
COMMUNITY
Start: 2024-11-26

## 2024-12-20 RX ORDER — SODIUM CHLORIDE 0.9 % (FLUSH) 0.9 %
10 SYRINGE (ML) INJECTION EVERY 12 HOURS SCHEDULED
Status: DISCONTINUED | OUTPATIENT
Start: 2024-12-20 | End: 2024-12-23 | Stop reason: HOSPADM

## 2024-12-20 RX ORDER — POLYETHYLENE GLYCOL 3350 17 G/17G
17 POWDER, FOR SOLUTION ORAL DAILY
COMMUNITY
Start: 2024-12-16

## 2024-12-20 RX ORDER — POTASSIUM CHLORIDE 7.45 MG/ML
10 INJECTION INTRAVENOUS PRN
Status: DISCONTINUED | OUTPATIENT
Start: 2024-12-20 | End: 2024-12-23 | Stop reason: HOSPADM

## 2024-12-20 RX ORDER — ONDANSETRON 2 MG/ML
4 INJECTION INTRAMUSCULAR; INTRAVENOUS EVERY 6 HOURS PRN
Status: DISCONTINUED | OUTPATIENT
Start: 2024-12-20 | End: 2024-12-23 | Stop reason: HOSPADM

## 2024-12-20 RX ORDER — IBUPROFEN 800 MG/1
800 TABLET, FILM COATED ORAL 2 TIMES DAILY
COMMUNITY

## 2024-12-20 RX ORDER — BENZTROPINE MESYLATE 1 MG/1
1 TABLET ORAL 2 TIMES DAILY
Status: DISCONTINUED | OUTPATIENT
Start: 2024-12-20 | End: 2024-12-23 | Stop reason: HOSPADM

## 2024-12-20 RX ADMIN — LACTULOSE 10 G: 10 SOLUTION ORAL at 20:59

## 2024-12-20 RX ADMIN — POTASSIUM BICARBONATE 40 MEQ: 782 TABLET, EFFERVESCENT ORAL at 14:28

## 2024-12-20 RX ADMIN — BENZTROPINE MESYLATE 1 MG: 1 TABLET ORAL at 20:58

## 2024-12-20 RX ADMIN — SODIUM CHLORIDE, PRESERVATIVE FREE 10 ML: 5 INJECTION INTRAVENOUS at 20:17

## 2024-12-20 RX ADMIN — BUDESONIDE AND FORMOTEROL FUMARATE DIHYDRATE 2 PUFF: 80; 4.5 AEROSOL RESPIRATORY (INHALATION) at 20:07

## 2024-12-20 RX ADMIN — ROSUVASTATIN 20 MG: 20 TABLET, FILM COATED ORAL at 20:58

## 2024-12-20 RX ADMIN — ENOXAPARIN SODIUM 40 MG: 100 INJECTION SUBCUTANEOUS at 17:48

## 2024-12-20 RX ADMIN — Medication 4.5 MG: at 21:03

## 2024-12-20 RX ADMIN — POTASSIUM BICARBONATE 40 MEQ: 782 TABLET, EFFERVESCENT ORAL at 17:47

## 2024-12-20 RX ADMIN — LEVOFLOXACIN 500 MG: 5 INJECTION, SOLUTION INTRAVENOUS at 17:53

## 2024-12-20 ASSESSMENT — PAIN - FUNCTIONAL ASSESSMENT
PAIN_FUNCTIONAL_ASSESSMENT: 0-10
PAIN_FUNCTIONAL_ASSESSMENT: ACTIVITIES ARE NOT PREVENTED

## 2024-12-20 ASSESSMENT — PAIN DESCRIPTION - LOCATION
LOCATION: ABDOMEN
LOCATION: ABDOMEN

## 2024-12-20 ASSESSMENT — PAIN SCALES - GENERAL
PAINLEVEL_OUTOF10: 0
PAINLEVEL_OUTOF10: 2
PAINLEVEL_OUTOF10: 7

## 2024-12-20 ASSESSMENT — PAIN DESCRIPTION - ORIENTATION
ORIENTATION: LOWER
ORIENTATION: LEFT;LOWER

## 2024-12-20 ASSESSMENT — LIFESTYLE VARIABLES
HOW OFTEN DO YOU HAVE A DRINK CONTAINING ALCOHOL: NEVER
HOW MANY STANDARD DRINKS CONTAINING ALCOHOL DO YOU HAVE ON A TYPICAL DAY: PATIENT DOES NOT DRINK

## 2024-12-20 ASSESSMENT — PAIN DESCRIPTION - PAIN TYPE: TYPE: ACUTE PAIN

## 2024-12-20 ASSESSMENT — PAIN DESCRIPTION - DESCRIPTORS
DESCRIPTORS: CRAMPING
DESCRIPTORS: PRESSURE

## 2024-12-20 ASSESSMENT — PAIN DESCRIPTION - FREQUENCY: FREQUENCY: INTERMITTENT

## 2024-12-20 NOTE — ED PROVIDER NOTES
[facility] Emergency Department  [address] [address]  Phone: [phone]        [facility] EMERGENCY DEPARTMENT  EMERGENCY DEPARTMENT ENCOUNTER      Pt Name: Zoila Rick  MRN: [id number]  Birthdate 1960  Date of evaluation: 12/20/2024  Provider: Tiny Hallman DO    CHIEF COMPLAINT       Chief Complaint   Patient presents with    Abdominal Pain     Acute urinary retention from [facility]       HISTORY OF PRESENT ILLNESS   (Location/Symptom, Timing/Onset,Context/Setting, Quality, Duration, Modifying Factors, Severity)  Note limiting factors.     Zoila Rick is a 64 y.o. female who presents to the emergency department with concern for urinary retention.  Patient was sent over from her primary doctor's office.  Patient states she has not been urinating much over the past few days, states she is still urinating small amounts.  Denies any dysuria or hematuria.  Denies ever having any issues like this in the past.  Patient also states she also believes she is developing a sore on her buttock region, states she does a lot at home.  Patient states she does not currently follow with a urologist.  Patient denies any abdominal pain.  Otherwise states she has been feeling fine.    Nursing Notes were reviewed.    REVIEW OF SYSTEMS       Review of Systems   Constitutional:  Negative for chills and fever.   Respiratory:  Negative for shortness of breath.    Cardiovascular:  Negative for chest pain.   Gastrointestinal:  Positive for abdominal distention. Negative for abdominal pain, nausea and vomiting.   Genitourinary:  Positive for difficulty urinating. Negative for dysuria and hematuria.       PAST MEDICAL HISTORY     Past Medical History:   Diagnosis Date    Depression     Diabetes mellitus (HCC)     Hyperlipidemia     Hypertension     Thyroid disease     Hypothyroid       SURGICAL HISTORY       Past Surgical History:   Procedure Laterality Date    ABDOMEN SURGERY

## 2024-12-20 NOTE — ED NOTES
ED Report    Presents to ED from: Home    Chief Complaint   Patient presents with    Abdominal Pain     Acute urinary retention from [facility]     1. Hyponatremia    2. Hypokalemia    3. Urinary retention      Present Condition: stable  Pertinent Event(s): pt had 2350 ml returned from irizarry insertion.     LOC:  A+O x 4  Code Status: FULL    Vital Signs   Vitals:    12/20/24 1238 12/20/24 1252   BP: 113/77 113/77   Pulse: 69 67   Resp:  16   Temp: 96.8 °F (36 °C) 96.8 °F (36 °C)   SpO2:  95%   Weight:  72.6 kg (160 lb)   Height:  1.664 m (5' 5.5\")     Oxygen Baseline: Room Air       Current Oxygen Needs: Room Air  Mobility: x1 Assist       Mobility Aide: Walker    LDAs:   Peripheral IV 12/20/24 Left Antecubital (Active)   Site Assessment Clean, dry & intact 12/20/24 1416   Line Status Blood return noted;Normal saline locked;Flushed 12/20/24 1416   Phlebitis Assessment No symptoms 12/20/24 1416   Infiltration Assessment 0 12/20/24 1416   Dressing Status New dressing applied 12/20/24 1416   Dressing Type Transparent 12/20/24 1416   Dressing Intervention New 12/20/24 1416       Urinary Catheter 12/20/24 (Active)   Catheter Indications Urinary retention (acute or chronic), continuous bladder irrigation or bladder outlet obstruction 12/20/24 1320   Urine Color Diluted 12/20/24 1320   Urine Appearance Clear 12/20/24 1320   Output (mL) 2350 mL 12/20/24 1320     Abnormal ED Labs:    Labs Reviewed   CBC WITH AUTO DIFFERENTIAL - Abnormal; Notable for the following components:       Result Value    Hematocrit 34.0 (*)     MCV 81.7 (*)     MCHC 36.2 (*)     Neutrophils % 80 (*)     Lymphocytes % 12 (*)     Lymphocytes Absolute 1.03 (*)     All other components within normal limits   COMPREHENSIVE METABOLIC PANEL - Abnormal; Notable for the following components:    Sodium 120 (*)     Potassium 2.9 (*)     Chloride 76 (*)     BUN 6 (*)     Alkaline Phosphatase 125 (*)     All other components within normal limits    URINALYSIS WITH REFLEX TO CULTURE - Abnormal; Notable for the following components:    Glucose, Ur 3+ (*)     pH, Urine 7.0 (*)     All other components within normal limits     Imaging:    CT ABDOMEN PELVIS WO CONTRAST Additional Contrast? None   Final Result   Mild fullness of the right renal collecting system related to mild reflux or   may be transient in nature.  No renal calculus identified.      Issa catheter extends into and mildly decompresses the bladder. Air is seen   in the non dependent bladder.      Left adrenal adenitis which may be infectious or inflammatory in etiology.      Moderate volume fecal residuals in the colon.      Additional findings noted above.           Medications Administered         potassium bicarb-citric acid (EFFER-K) effervescent tablet 40 mEq Admin Date  12/20/2024 Action  Given Dose  40 mEq Route  Oral Documented By  Isha Garcia RN          Medication Reconciliation Completed: Yes      Consults:    [x] Hospitalist  Completed     [x] yes     [] no    [] Cardiology  Completed     [] yes     [] no    [] General Surgery  Completed     [] yes     [] no                 [] Orthopedics  Completed     [] yes     [] no    [] OB/GYN   Completed     [] yes     [] no    [] Oncology   Completed     [] yes     [] no    [] Neurology   Completed     [] yes     [] no    [] Podiatry   Completed     [] yes     [] no    [] Urology   Completed     [] yes     [] no         Electronically signed by Isha Garcia RN on 12/20/2024 at 3:35 PM

## 2024-12-20 NOTE — H&P
HOSPITALIST ADMISSION H&P      REASON FOR ADMISSION:  hyponatremia---hypokalemia------urinary retention  ESTIMATED LENGTH OF STAY:  > 2 midnights---2-3 days    ATTENDING/ADMITTING PHYSICIAN: Frank Rain MD MD  PCP: Zoila Evans APRN - NP    HISTORY OF PRESENT ILLNESS:      The patient is a 64 y.o. female patient of Zoila Evans APRN - NP who presents with increasing abdominal pain and distended lower abdomen---found to be in urinary retention --> irizarry --> large PVR with relief of pain.  Prior history of urinary retention.  Likely post-obstructive high volume urine output superposed on retention--consider SIADH    NA = 120---possibly multifactorial----see below    K = 2.9-----MG pending    HTN----113/77      Psychiatric--on Loxitane which may contribute to hyponatremia particularly when combined with polydipsia    CT--AP--question of left adrenal adenitis    DM2    Quit tobacco---c. 8.1.2024     See below for additional PMH.    Patient iewr-[id number]-[id number]-available records reviewed, including, but not limited to, ER reports--labs--imaging--office records--personal notes       Past Medical History:   Diagnosis Date    Depression     Diabetes mellitus (HCC)     Hyperlipidemia     Hypertension     Thyroid disease     Hypothyroid           Past Surgical History:   Procedure Laterality Date    ABDOMEN SURGERY      CHOLECYSTECTOMY      COLONOSCOPY  8/12/2019    COLONOSCOPY FLEXIBLE W/ DECOMPRESSION performed by Robles Louise MD at [facility]    COLONOSCOPY N/A 8/16/2019    COLONOSCOPY FLEXIBLE W/ DECOMPRESSION performed by Daniel Alexander MD at [facility]    COLONOSCOPY N/A 10/19/2020    COLONOSCOPY, POLYPECTOMY, SNARE, AND TATTOOING OF SIGMOID COLON MASS SITE performed by Erik Douglas DO at [facility] OR    TUBAL LIGATION  1985    UMBILICAL HERNIA REPAIR         Medications Prior to Admission:    Medications Prior to Admission: albuterol sulfate HFA (PROVENTIL;VENTOLIN;PROAIR) 108 (90 Base) MCG/ACT  capsule, Take 2 capsules by mouth every 6 hours as needed for Itching (Patient not taking: Reported on 12/20/2024)    Allergies:    Bicillin [penicillin g benzathine], Penicillins, and Ziprasidone    Social History:    reports that she quit smoking about 4 months ago. Her smoking use included cigarettes. She started smoking about 45 years ago. She has a 42.7 pack-year smoking history. She has never used smokeless tobacco. She reports that she does not drink alcohol and does not use drugs.    Family History:   family history includes Diabetes in her mother; Heart Disease in her mother; Stroke in her brother.    REVIEW OF SYSTEMS:  See HPI and problem list; otherwise no other new complaints with respect to HEENT, neck, pulmonary, coronary, GI, , endocrine, musculoskeletal, immune system/connective tissue disease, hematologic, neuropsych, skin, lymphatics, or malignancies.     PHYSICAL EXAM:  Vitals:  /75   Pulse 72   Temp 97.3 °F (36.3 °C) (Tympanic)   Resp 18   Ht 1.664 m (5' 5.51\")   Wt 68.7 kg (151 lb 8 oz)   SpO2 96%   BMI 24.82 kg/m²     HEENT: Normocephalic and Atraumatic  Neck: Supple, No Bruits, No Masses, Tenderness, Nodularity, and No Lymphadenopathy  Chest/Lungs: Clear to Auscultation without Rales, Rhonchi, or Wheezes and Distant Breath Sounds  Cardiac: Regular Rate and Rhythm  GI/Abdomen: Bowel Sounds Present and Soft, Non-tender, without Guarding or Rebound Tenderness  : Not examined  EXT/Skin: No Edema, No Cyanosis, and No Clubbing  Neuro:  alert--generalized weakness        LABS:    CBC with Differential:    Lab Results   Component Value Date/Time    WBC 8.8 12/20/2024 01:17 PM    RBC 4.16 12/20/2024 01:17 PM    HGB 12.3 12/20/2024 01:17 PM    HCT 34.0 12/20/2024 01:17 PM     12/20/2024 01:17 PM    MCV 81.7 12/20/2024 01:17 PM    MCH 29.6 12/20/2024 01:17 PM    MCHC 36.2 12/20/2024 01:17 PM    RDW 12.2 12/20/2024 01:17 PM    LYMPHOPCT 12 12/20/2024 01:17 PM    MONOPCT 6

## 2024-12-20 NOTE — PROGRESS NOTES
[]  Faint, scattered wheezing, good aeration []  Expiratory Wheezing and or moderately diminished []  Insp/Exp wheeze and/or very diminished []  Insp/Exp and/ or marked distress   Respiratory Rate   []  Patient Baseline [x]  Less than 20 []  Less than 20 []  20-25 []  Greater than 25 []  Greater than 25   Peak flow % of Pred or PB []  NA   []  Greater than 90%  []  81-90% []  71-80% [x]  Less than or equal to 70%  or unable to perform []  Unable due to Respiratory Distress   Dyspnea re []  Patient Baseline [x]  No SOB []  No SOB []  SOB on exertion []  SOB min activity []  At rest/acute   e FEV% Predicted       []  NA [x]  Above 69%  []  Unable []  Above 60-69%  []  Unable []  Above 50-59%  []  Unable []  Above 35-49%  []  Unable []  Less than 35%  []  Unable                 [x]  Hyperinflation Assessment  Score 1 2 3   CXR and Breath Sounds   [x]  Clear []  No atelectasis  Basilar aeration []  Atelectasis or absent basilar breath sounds   Incentive Spirometry Volume  (Per IBW)   [x]  Greater than or equal to 15ml/Kg []  less than 15ml/Kg []  less than 15ml/Kg   Surgery within last 2 weeks [x]  None or general   []  Abdominal or thoracic surgery  []  Abdominal or thoracic   Chronic Pulmonary Historyre [x]  No []  Yes []  Yes     [x]  Secretion Management Assessment  Score 1 2 3   Bilateral Breath Sounds   [x]  Occasional Rhonchi []  Scattered Rhonchi []  Course Rhonchi and/or poor aeration   Sputum    [x]  Small amount of thin secretions []  Moderate amount of viscous secretions []  Copius, Viscious Yellow/ Secretions   CXR as reported by physician []  clear  [x]  Unavailable []  Infiltrates and/or consolidation  []  Unavailable []  Mucus Plugging and or lobar consolidation  []  Unavailable   Cough [x]  Strong, productive cough []  Weak productive cough []  No cough or weak non-productive cough   Lavonne Dubois RCP  5:35 PM                            FEMALE                                  MALE                             FEV1 Predicted Normal Values                        FEV1 Predicted Normal Values          Age                                     Height in Feet and Inches       Age                                     Height in Feet and Inches       4' 11\" 5' 1\" 5' 3\" 5' 5\" 5' 7\" 5' 9\" 5' 11\" 6' 1\"  4' 11\" 5' 1\" 5' 3\" 5' 5\" 5' 7\" 5' 9\" 5' 11\" 6' 1\"   42 - 45 2.49 2.66 2.84 3.03 3.22 3.42 3.62 3.83 42 - 45 2.82 3.03 3.26 3.49 3.72 3.96 4.22 4.47   46 - 49 2.40 2.57 2.76 2.94 3.14 3.33 3.54 3.75 46 - 49 2.70 2.92 3.14 3.37 3.61 3.85 4.10 4.36   50 - 53 2.31 2.48 2.66 2.85 3.04 3.24 3.45 3.66 50 - 53 2.58 2.80 3.02 3.25 3.49 3.73 3.98 4.24   54 - 57 2.21 2.38 2.57 2.75 2.95 3.14 3.35 3.56 54 - 57 2.46 2.67 2.89 3.12 3.36 3.60 3.85 4.11   58 - 61 2.10 2.28 2.46 2.65 2.84 3.04 3.24 3.45 58 - 61 2.32 2.54 2.76 2.99 3.23 3.47 3.72 3.98   62 - 65 1.99 2.17 2.35 2.54 2.73 2.93 3.13 3.34 62 - 65 2.19 2.40 2.62 2.85 3.09 3.33 3.58 3.84   66 - 69 1.88 2.05 2.23 2.42 2.61 2.81 3.02 3.23 66 - 69 2.04 2.26 2.48 2.71 2.95 3.19 3.44 3.70   70+ 1.82 1.99 2.17 2.36 2.55 2.75 2.95 3.16 70+ 1.97 2.19 2.41 2.64 2.87 3.12 3.37 3.62             Predicted Peak Expiratory Flow Rate                                       Height (in)  Female       Height (in) Male           Age 56 58 60 62 64 66 68 70 Age            20 344 357 372 387 [phone] 446  60 62 64 66 68 70 72 74 76   25 337 352 366 381 396 411 426 441 25 447 476 505 533 562 [phone]   30 329 344 359 374 389 404 419 434 30 437 466 494 523 552 [phone]   35 322 337 351 366 381 396 411 426 35 426 455 484 512 541 [phone]   40 314 329 344 359 374 389 404 419 40 416 445 473 [phone] 647   45 307 322 336 351 366 381 396 411 45 405 434 463 491 520 549 [phone]   50 299 314 329 344 359 374 389 404 50 395 424 452 481 510 [phone] 625   55 292 307 321 336 351 366 381 396 55 384 413 442 470 499 [phone] 615   60 284 299 314 329 344 359 374 389 60 374 [phone]

## 2024-12-21 ENCOUNTER — APPOINTMENT (OUTPATIENT)
Dept: GENERAL RADIOLOGY | Age: 64
DRG: 641 | End: 2024-12-21
Payer: MEDICARE

## 2024-12-21 LAB
ANION GAP SERPL CALCULATED.3IONS-SCNC: 13 MMOL/L (ref 9–17)
ANION GAP SERPL CALCULATED.3IONS-SCNC: 13 MMOL/L (ref 9–17)
ANION GAP SERPL CALCULATED.3IONS-SCNC: 14 MMOL/L (ref 9–17)
ANION GAP SERPL CALCULATED.3IONS-SCNC: 14 MMOL/L (ref 9–17)
ANION GAP SERPL CALCULATED.3IONS-SCNC: 15 MMOL/L (ref 9–17)
ANION GAP SERPL CALCULATED.3IONS-SCNC: 15 MMOL/L (ref 9–17)
BASOPHILS # BLD: 0.05 K/UL (ref 0–0.2)
BASOPHILS NFR BLD: 1 % (ref 0–2)
BUN SERPL-MCNC: 6 MG/DL (ref 8–23)
BUN SERPL-MCNC: 6 MG/DL (ref 8–23)
BUN SERPL-MCNC: 7 MG/DL (ref 8–23)
BUN SERPL-MCNC: 7 MG/DL (ref 8–23)
BUN SERPL-MCNC: 8 MG/DL (ref 8–23)
BUN SERPL-MCNC: 8 MG/DL (ref 8–23)
BUN/CREAT SERPL: 10 (ref 9–20)
BUN/CREAT SERPL: 8 (ref 9–20)
BUN/CREAT SERPL: 8 (ref 9–20)
BUN/CREAT SERPL: 9 (ref 9–20)
CALCIUM SERPL-MCNC: 9 MG/DL (ref 8.6–10.4)
CALCIUM SERPL-MCNC: 9 MG/DL (ref 8.6–10.4)
CALCIUM SERPL-MCNC: 9.5 MG/DL (ref 8.6–10.4)
CALCIUM SERPL-MCNC: 9.6 MG/DL (ref 8.6–10.4)
CALCIUM SERPL-MCNC: 9.6 MG/DL (ref 8.6–10.4)
CALCIUM SERPL-MCNC: 9.7 MG/DL (ref 8.6–10.4)
CHLORIDE SERPL-SCNC: 80 MMOL/L (ref 98–107)
CHLORIDE SERPL-SCNC: 82 MMOL/L (ref 98–107)
CHLORIDE SERPL-SCNC: 83 MMOL/L (ref 98–107)
CHLORIDE SERPL-SCNC: 83 MMOL/L (ref 98–107)
CHLORIDE SERPL-SCNC: 86 MMOL/L (ref 98–107)
CHLORIDE SERPL-SCNC: 86 MMOL/L (ref 98–107)
CO2 SERPL-SCNC: 26 MMOL/L (ref 20–31)
CO2 SERPL-SCNC: 26 MMOL/L (ref 20–31)
CO2 SERPL-SCNC: 27 MMOL/L (ref 20–31)
CO2 SERPL-SCNC: 28 MMOL/L (ref 20–31)
CO2 SERPL-SCNC: 29 MMOL/L (ref 20–31)
CO2 SERPL-SCNC: 31 MMOL/L (ref 20–31)
CREAT SERPL-MCNC: 0.8 MG/DL (ref 0.5–0.9)
CREAT SERPL-MCNC: 0.9 MG/DL (ref 0.5–0.9)
EOSINOPHIL # BLD: 0.08 K/UL (ref 0–0.44)
EOSINOPHILS RELATIVE PERCENT: 1 % (ref 1–4)
ERYTHROCYTE [DISTWIDTH] IN BLOOD BY AUTOMATED COUNT: 12.2 % (ref 11.8–14.4)
GFR, ESTIMATED: 71 ML/MIN/1.73M2
GFR, ESTIMATED: 82 ML/MIN/1.73M2
GLUCOSE BLD-MCNC: 136 MG/DL (ref 65–105)
GLUCOSE SERPL-MCNC: 102 MG/DL (ref 70–99)
GLUCOSE SERPL-MCNC: 129 MG/DL (ref 70–99)
GLUCOSE SERPL-MCNC: 136 MG/DL (ref 70–99)
GLUCOSE SERPL-MCNC: 153 MG/DL (ref 70–99)
GLUCOSE SERPL-MCNC: 167 MG/DL (ref 70–99)
GLUCOSE SERPL-MCNC: 81 MG/DL (ref 70–99)
HCT VFR BLD AUTO: 33.3 % (ref 36.3–47.1)
HGB BLD-MCNC: 11.9 G/DL (ref 11.9–15.1)
IMM GRANULOCYTES # BLD AUTO: <0.03 K/UL (ref 0–0.3)
IMM GRANULOCYTES NFR BLD: 0 %
LYMPHOCYTES NFR BLD: 1.73 K/UL (ref 1.1–3.7)
LYMPHOCYTES RELATIVE PERCENT: 22 % (ref 24–43)
MCH RBC QN AUTO: 29.7 PG (ref 25.2–33.5)
MCHC RBC AUTO-ENTMCNC: 35.7 G/DL (ref 25.2–33.5)
MCV RBC AUTO: 83 FL (ref 82.6–102.9)
MONOCYTES NFR BLD: 0.62 K/UL (ref 0.1–1.2)
MONOCYTES NFR BLD: 8 % (ref 3–12)
NEUTROPHILS NFR BLD: 68 % (ref 36–65)
NEUTS SEG NFR BLD: 5.26 K/UL (ref 1.5–8.1)
NRBC BLD-RTO: 0 PER 100 WBC
OSMOLALITY SERPL: 270 MOSM/KG (ref 275–295)
PLATELET # BLD AUTO: 211 K/UL (ref 138–453)
PMV BLD AUTO: 8.9 FL (ref 8.1–13.5)
POTASSIUM SERPL-SCNC: 3 MMOL/L (ref 3.7–5.3)
POTASSIUM SERPL-SCNC: 3.4 MMOL/L (ref 3.7–5.3)
POTASSIUM SERPL-SCNC: 3.5 MMOL/L (ref 3.7–5.3)
POTASSIUM SERPL-SCNC: 3.6 MMOL/L (ref 3.7–5.3)
POTASSIUM SERPL-SCNC: 3.6 MMOL/L (ref 3.7–5.3)
POTASSIUM SERPL-SCNC: 3.7 MMOL/L (ref 3.7–5.3)
RBC # BLD AUTO: 4.01 M/UL (ref 3.95–5.11)
SODIUM SERPL-SCNC: 124 MMOL/L (ref 135–144)
SODIUM SERPL-SCNC: 125 MMOL/L (ref 135–144)
SODIUM SERPL-SCNC: 127 MMOL/L (ref 135–144)
SODIUM SERPL-SCNC: 127 MMOL/L (ref 135–144)
SODIUM UR-SCNC: 22 MMOL/L
WBC OTHER # BLD: 7.8 K/UL (ref 3.5–11.3)

## 2024-12-21 PROCEDURE — 94761 N-INVAS EAR/PLS OXIMETRY MLT: CPT

## 2024-12-21 PROCEDURE — 2060000000 HC ICU INTERMEDIATE R&B

## 2024-12-21 PROCEDURE — 83930 ASSAY OF BLOOD OSMOLALITY: CPT

## 2024-12-21 PROCEDURE — 93005 ELECTROCARDIOGRAM TRACING: CPT | Performed by: INTERNAL MEDICINE

## 2024-12-21 PROCEDURE — 6370000000 HC RX 637 (ALT 250 FOR IP): Performed by: INTERNAL MEDICINE

## 2024-12-21 PROCEDURE — 36415 COLL VENOUS BLD VENIPUNCTURE: CPT

## 2024-12-21 PROCEDURE — 80048 BASIC METABOLIC PNL TOTAL CA: CPT

## 2024-12-21 PROCEDURE — 51702 INSERT TEMP BLADDER CATH: CPT

## 2024-12-21 PROCEDURE — 85025 COMPLETE CBC W/AUTO DIFF WBC: CPT

## 2024-12-21 PROCEDURE — 71046 X-RAY EXAM CHEST 2 VIEWS: CPT

## 2024-12-21 PROCEDURE — 6370000000 HC RX 637 (ALT 250 FOR IP): Performed by: FAMILY MEDICINE

## 2024-12-21 PROCEDURE — 2500000003 HC RX 250 WO HCPCS: Performed by: INTERNAL MEDICINE

## 2024-12-21 PROCEDURE — 6360000002 HC RX W HCPCS: Performed by: INTERNAL MEDICINE

## 2024-12-21 PROCEDURE — 82947 ASSAY GLUCOSE BLOOD QUANT: CPT

## 2024-12-21 PROCEDURE — 2580000003 HC RX 258: Performed by: FAMILY MEDICINE

## 2024-12-21 PROCEDURE — 84300 ASSAY OF URINE SODIUM: CPT

## 2024-12-21 RX ORDER — SODIUM CHLORIDE 9 MG/ML
INJECTION, SOLUTION INTRAVENOUS CONTINUOUS
Status: DISCONTINUED | OUTPATIENT
Start: 2024-12-21 | End: 2024-12-23 | Stop reason: HOSPADM

## 2024-12-21 RX ORDER — POLYETHYLENE GLYCOL 3350 17 G/17G
17 POWDER, FOR SOLUTION ORAL DAILY
Status: DISCONTINUED | OUTPATIENT
Start: 2024-12-21 | End: 2024-12-23 | Stop reason: HOSPADM

## 2024-12-21 RX ORDER — GLUCAGON 1 MG/ML
1 KIT INJECTION PRN
Status: DISCONTINUED | OUTPATIENT
Start: 2024-12-21 | End: 2024-12-23 | Stop reason: HOSPADM

## 2024-12-21 RX ORDER — DEXTROSE MONOHYDRATE 100 MG/ML
INJECTION, SOLUTION INTRAVENOUS CONTINUOUS PRN
Status: DISCONTINUED | OUTPATIENT
Start: 2024-12-21 | End: 2024-12-23 | Stop reason: HOSPADM

## 2024-12-21 RX ADMIN — METOPROLOL SUCCINATE 25 MG: 25 TABLET, EXTENDED RELEASE ORAL at 10:00

## 2024-12-21 RX ADMIN — ASPIRIN 81 MG: 81 TABLET, COATED ORAL at 09:59

## 2024-12-21 RX ADMIN — POTASSIUM CHLORIDE 10 MEQ: 7.46 INJECTION, SOLUTION INTRAVENOUS at 09:50

## 2024-12-21 RX ADMIN — ACETAMINOPHEN 650 MG: 325 TABLET ORAL at 20:07

## 2024-12-21 RX ADMIN — BENZTROPINE MESYLATE 1 MG: 1 TABLET ORAL at 10:00

## 2024-12-21 RX ADMIN — POTASSIUM CHLORIDE 10 MEQ: 7.46 INJECTION, SOLUTION INTRAVENOUS at 06:23

## 2024-12-21 RX ADMIN — LACTULOSE 10 G: 10 SOLUTION ORAL at 15:00

## 2024-12-21 RX ADMIN — EMPAGLIFLOZIN 10 MG: 10 TABLET, FILM COATED ORAL at 09:59

## 2024-12-21 RX ADMIN — SODIUM CHLORIDE: 9 INJECTION, SOLUTION INTRAVENOUS at 16:54

## 2024-12-21 RX ADMIN — SODIUM CHLORIDE: 9 INJECTION, SOLUTION INTRAVENOUS at 15:00

## 2024-12-21 RX ADMIN — POLYETHYLENE GLYCOL 3350 17 G: 17 POWDER, FOR SOLUTION ORAL at 11:18

## 2024-12-21 RX ADMIN — POTASSIUM CHLORIDE 40 MEQ: 1500 TABLET, EXTENDED RELEASE ORAL at 18:12

## 2024-12-21 RX ADMIN — ROSUVASTATIN 20 MG: 20 TABLET, FILM COATED ORAL at 20:39

## 2024-12-21 RX ADMIN — POTASSIUM CHLORIDE 10 MEQ: 7.46 INJECTION, SOLUTION INTRAVENOUS at 05:17

## 2024-12-21 RX ADMIN — POTASSIUM CHLORIDE 10 MEQ: 7.46 INJECTION, SOLUTION INTRAVENOUS at 02:23

## 2024-12-21 RX ADMIN — LOSARTAN POTASSIUM 12.5 MG: 25 TABLET, FILM COATED ORAL at 09:59

## 2024-12-21 RX ADMIN — LEVOFLOXACIN 500 MG: 5 INJECTION, SOLUTION INTRAVENOUS at 16:59

## 2024-12-21 RX ADMIN — POTASSIUM CHLORIDE 10 MEQ: 7.46 INJECTION, SOLUTION INTRAVENOUS at 04:14

## 2024-12-21 RX ADMIN — LACTULOSE 10 G: 10 SOLUTION ORAL at 10:02

## 2024-12-21 RX ADMIN — GLIPIZIDE 5 MG: 5 TABLET ORAL at 06:40

## 2024-12-21 RX ADMIN — LEVOTHYROXINE SODIUM 25 MCG: 0.03 TABLET ORAL at 06:40

## 2024-12-21 RX ADMIN — ENOXAPARIN SODIUM 40 MG: 100 INJECTION SUBCUTANEOUS at 10:00

## 2024-12-21 RX ADMIN — SODIUM CHLORIDE, PRESERVATIVE FREE 10 ML: 5 INJECTION INTRAVENOUS at 20:40

## 2024-12-21 RX ADMIN — POTASSIUM CHLORIDE 10 MEQ: 7.46 INJECTION, SOLUTION INTRAVENOUS at 03:13

## 2024-12-21 RX ADMIN — BENZTROPINE MESYLATE 1 MG: 1 TABLET ORAL at 20:39

## 2024-12-21 RX ADMIN — LACTULOSE 10 G: 10 SOLUTION ORAL at 20:37

## 2024-12-21 RX ADMIN — DOCUSATE SODIUM 100 MG: 100 CAPSULE, LIQUID FILLED ORAL at 22:17

## 2024-12-21 ASSESSMENT — PAIN SCALES - GENERAL
PAINLEVEL_OUTOF10: 5
PAINLEVEL_OUTOF10: 6

## 2024-12-21 ASSESSMENT — PAIN - FUNCTIONAL ASSESSMENT: PAIN_FUNCTIONAL_ASSESSMENT: ACTIVITIES ARE NOT PREVENTED

## 2024-12-21 ASSESSMENT — PAIN DESCRIPTION - DESCRIPTORS: DESCRIPTORS: DISCOMFORT

## 2024-12-21 ASSESSMENT — PAIN DESCRIPTION - LOCATION: LOCATION: ABDOMEN

## 2024-12-21 NOTE — PROGRESS NOTES
Hospitalist Progress Note  12/21/2024 6:26 PM  Subjective:   Admit Date: 12/20/2024  PCP: Zoila Evans APRN - NP     Full Code      C/c:  Chief Complaint   Patient presents with    Abdominal Pain     Acute urinary retention from [facility]         Interval History: pt doing slighttly better, siadh/on psych meds    Diet: ADULT DIET; Regular                                ip days:1  Medications:   Scheduled Meds:   polyethylene glycol  17 g Oral Daily    sodium chloride flush  10 mL IntraVENous 2 times per day    enoxaparin  40 mg SubCUTAneous Daily    levofloxacin  500 mg IntraVENous Q24H    benztropine  1 mg Oral BID    empagliflozin  10 mg Oral Daily    glipiZIDE  5 mg Oral QAM AC    lactulose  10 g Oral TID    levothyroxine  25 mcg Oral Daily    metoprolol succinate  25 mg Oral Daily    rosuvastatin  20 mg Oral Nightly    aspirin  81 mg Oral Daily    budesonide-formoterol  2 puff Inhalation BID RT    losartan  12.5 mg Oral Daily    loxapine  10 mg Oral Nightly     Continuous Infusions:   sodium chloride 75 mL/hr at 12/21/24 1654    sodium chloride       PRN Meds:.sodium chloride flush, sodium chloride, potassium chloride **OR** potassium alternative oral replacement **OR** potassium chloride, magnesium sulfate, ondansetron, acetaminophen, albuterol, diphenhydrAMINE, melatonin, docusate sodium     CBC:   Recent Labs     12/20/24  1317 12/21/24  0454   WBC 8.8 7.8   HGB 12.3 11.9    211     BMP:    Recent Labs     12/21/24  0850 12/21/24  1249 12/21/24  1649   * 124* 127*   K 3.6* 3.6* 3.4*   CL 82* 83* 86*   CO2 29 26 27   BUN 7* 7* 6*   CREATININE 0.8 0.8 0.8   GLUCOSE 153* 167* 129*     Hepatic:   Recent Labs     12/20/24  1317   AST 18   ALT 10   BILITOT 0.5   ALKPHOS 125*     Troponin: No results for input(s): \"TROPONINI\" in the last 72 hours.  BNP: No results for input(s): \"BNP\" in the last 72 hours.  Lipids: No results for input(s): \"CHOL\", \"HDL\" in the last 72 hours.    Invalid

## 2024-12-22 LAB
ANION GAP SERPL CALCULATED.3IONS-SCNC: 11 MMOL/L (ref 9–17)
ANION GAP SERPL CALCULATED.3IONS-SCNC: 13 MMOL/L (ref 9–17)
ANION GAP SERPL CALCULATED.3IONS-SCNC: 13 MMOL/L (ref 9–17)
ANION GAP SERPL CALCULATED.3IONS-SCNC: 15 MMOL/L (ref 9–17)
BASOPHILS # BLD: 0.07 K/UL (ref 0–0.2)
BASOPHILS NFR BLD: 1 % (ref 0–2)
BUN SERPL-MCNC: 5 MG/DL (ref 8–23)
BUN/CREAT SERPL: 6 (ref 9–20)
BUN/CREAT SERPL: 7 (ref 9–20)
CALCIUM SERPL-MCNC: 9.3 MG/DL (ref 8.6–10.4)
CALCIUM SERPL-MCNC: 9.4 MG/DL (ref 8.6–10.4)
CALCIUM SERPL-MCNC: 9.6 MG/DL (ref 8.6–10.4)
CALCIUM SERPL-MCNC: 9.7 MG/DL (ref 8.6–10.4)
CHLORIDE SERPL-SCNC: 90 MMOL/L (ref 98–107)
CHLORIDE SERPL-SCNC: 91 MMOL/L (ref 98–107)
CO2 SERPL-SCNC: 22 MMOL/L (ref 20–31)
CO2 SERPL-SCNC: 25 MMOL/L (ref 20–31)
CO2 SERPL-SCNC: 25 MMOL/L (ref 20–31)
CO2 SERPL-SCNC: 27 MMOL/L (ref 20–31)
CREAT SERPL-MCNC: 0.7 MG/DL (ref 0.5–0.9)
CREAT SERPL-MCNC: 0.8 MG/DL (ref 0.5–0.9)
EOSINOPHIL # BLD: 0.08 K/UL (ref 0–0.44)
EOSINOPHILS RELATIVE PERCENT: 1 % (ref 1–4)
ERYTHROCYTE [DISTWIDTH] IN BLOOD BY AUTOMATED COUNT: 12.3 % (ref 11.8–14.4)
GFR, ESTIMATED: 82 ML/MIN/1.73M2
GFR, ESTIMATED: >90 ML/MIN/1.73M2
GLUCOSE SERPL-MCNC: 115 MG/DL (ref 70–99)
GLUCOSE SERPL-MCNC: 119 MG/DL (ref 70–99)
GLUCOSE SERPL-MCNC: 128 MG/DL (ref 70–99)
GLUCOSE SERPL-MCNC: 212 MG/DL (ref 70–99)
HCT VFR BLD AUTO: 38.4 % (ref 36.3–47.1)
HGB BLD-MCNC: 13.4 G/DL (ref 11.9–15.1)
IMM GRANULOCYTES # BLD AUTO: 0.04 K/UL (ref 0–0.3)
IMM GRANULOCYTES NFR BLD: 0 %
LYMPHOCYTES NFR BLD: 1.69 K/UL (ref 1.1–3.7)
LYMPHOCYTES RELATIVE PERCENT: 17 % (ref 24–43)
MCH RBC QN AUTO: 29.6 PG (ref 25.2–33.5)
MCHC RBC AUTO-ENTMCNC: 34.9 G/DL (ref 25.2–33.5)
MCV RBC AUTO: 84.8 FL (ref 82.6–102.9)
MONOCYTES NFR BLD: 0.65 K/UL (ref 0.1–1.2)
MONOCYTES NFR BLD: 6 % (ref 3–12)
NEUTROPHILS NFR BLD: 75 % (ref 36–65)
NEUTS SEG NFR BLD: 7.72 K/UL (ref 1.5–8.1)
NRBC BLD-RTO: 0 PER 100 WBC
PLATELET # BLD AUTO: 216 K/UL (ref 138–453)
PMV BLD AUTO: 8.9 FL (ref 8.1–13.5)
POTASSIUM SERPL-SCNC: 3.6 MMOL/L (ref 3.7–5.3)
POTASSIUM SERPL-SCNC: 3.7 MMOL/L (ref 3.7–5.3)
POTASSIUM SERPL-SCNC: 3.9 MMOL/L (ref 3.7–5.3)
POTASSIUM SERPL-SCNC: 3.9 MMOL/L (ref 3.7–5.3)
RBC # BLD AUTO: 4.53 M/UL (ref 3.95–5.11)
SODIUM SERPL-SCNC: 128 MMOL/L (ref 135–144)
SODIUM SERPL-SCNC: 128 MMOL/L (ref 135–144)
SODIUM SERPL-SCNC: 129 MMOL/L (ref 135–144)
SODIUM SERPL-SCNC: 129 MMOL/L (ref 135–144)
WBC OTHER # BLD: 10.3 K/UL (ref 3.5–11.3)

## 2024-12-22 PROCEDURE — 6370000000 HC RX 637 (ALT 250 FOR IP): Performed by: NURSE PRACTITIONER

## 2024-12-22 PROCEDURE — 6370000000 HC RX 637 (ALT 250 FOR IP): Performed by: FAMILY MEDICINE

## 2024-12-22 PROCEDURE — 2500000003 HC RX 250 WO HCPCS: Performed by: INTERNAL MEDICINE

## 2024-12-22 PROCEDURE — 2060000000 HC ICU INTERMEDIATE R&B

## 2024-12-22 PROCEDURE — 6360000002 HC RX W HCPCS: Performed by: INTERNAL MEDICINE

## 2024-12-22 PROCEDURE — 6370000000 HC RX 637 (ALT 250 FOR IP): Performed by: INTERNAL MEDICINE

## 2024-12-22 PROCEDURE — 83036 HEMOGLOBIN GLYCOSYLATED A1C: CPT

## 2024-12-22 PROCEDURE — 36415 COLL VENOUS BLD VENIPUNCTURE: CPT

## 2024-12-22 PROCEDURE — 51702 INSERT TEMP BLADDER CATH: CPT

## 2024-12-22 PROCEDURE — 85025 COMPLETE CBC W/AUTO DIFF WBC: CPT

## 2024-12-22 PROCEDURE — 80048 BASIC METABOLIC PNL TOTAL CA: CPT

## 2024-12-22 PROCEDURE — 2580000003 HC RX 258: Performed by: FAMILY MEDICINE

## 2024-12-22 RX ORDER — BETHANECHOL CHLORIDE 25 MG/1
25 TABLET ORAL 4 TIMES DAILY
Status: DISCONTINUED | OUTPATIENT
Start: 2024-12-22 | End: 2024-12-23 | Stop reason: HOSPADM

## 2024-12-22 RX ADMIN — LACTULOSE 10 G: 10 SOLUTION ORAL at 14:26

## 2024-12-22 RX ADMIN — BENZTROPINE MESYLATE 1 MG: 1 TABLET ORAL at 08:00

## 2024-12-22 RX ADMIN — ASPIRIN 81 MG: 81 TABLET, COATED ORAL at 08:00

## 2024-12-22 RX ADMIN — LEVOTHYROXINE SODIUM 25 MCG: 0.03 TABLET ORAL at 05:43

## 2024-12-22 RX ADMIN — LOSARTAN POTASSIUM 12.5 MG: 25 TABLET, FILM COATED ORAL at 08:00

## 2024-12-22 RX ADMIN — METOPROLOL SUCCINATE 25 MG: 25 TABLET, EXTENDED RELEASE ORAL at 08:00

## 2024-12-22 RX ADMIN — POLYETHYLENE GLYCOL 3350 17 G: 17 POWDER, FOR SOLUTION ORAL at 08:01

## 2024-12-22 RX ADMIN — Medication 4.5 MG: at 21:18

## 2024-12-22 RX ADMIN — EMPAGLIFLOZIN 10 MG: 10 TABLET, FILM COATED ORAL at 08:00

## 2024-12-22 RX ADMIN — ENOXAPARIN SODIUM 40 MG: 100 INJECTION SUBCUTANEOUS at 07:59

## 2024-12-22 RX ADMIN — BETHANECHOL CHLORIDE 25 MG: 25 TABLET ORAL at 20:51

## 2024-12-22 RX ADMIN — GLIPIZIDE 5 MG: 5 TABLET ORAL at 05:43

## 2024-12-22 RX ADMIN — SODIUM CHLORIDE, PRESERVATIVE FREE 10 ML: 5 INJECTION INTRAVENOUS at 08:04

## 2024-12-22 RX ADMIN — BENZTROPINE MESYLATE 1 MG: 1 TABLET ORAL at 20:51

## 2024-12-22 RX ADMIN — LACTULOSE 10 G: 10 SOLUTION ORAL at 08:00

## 2024-12-22 RX ADMIN — LEVOFLOXACIN 500 MG: 5 INJECTION, SOLUTION INTRAVENOUS at 16:29

## 2024-12-22 RX ADMIN — SODIUM CHLORIDE: 9 INJECTION, SOLUTION INTRAVENOUS at 06:05

## 2024-12-22 RX ADMIN — ROSUVASTATIN 20 MG: 20 TABLET, FILM COATED ORAL at 20:51

## 2024-12-22 RX ADMIN — SODIUM CHLORIDE: 9 INJECTION, SOLUTION INTRAVENOUS at 18:23

## 2024-12-22 NOTE — PROGRESS NOTES
Hospitalist Progress Note  12/22/2024 3:27 PM  Subjective:   Admit Date: 12/20/2024  PCP: Zoila Evans APRN - NP     Full Code      C/c:  Chief Complaint   Patient presents with    Abdominal Pain     Acute urinary retention from [facility]         Interval History: doing slightly better, na is up,    Diet: ADULT DIET; Regular                                ip days:2  Medications:   Scheduled Meds:   polyethylene glycol  17 g Oral Daily    sodium chloride flush  10 mL IntraVENous 2 times per day    enoxaparin  40 mg SubCUTAneous Daily    levofloxacin  500 mg IntraVENous Q24H    benztropine  1 mg Oral BID    empagliflozin  10 mg Oral Daily    glipiZIDE  5 mg Oral QAM AC    lactulose  10 g Oral TID    levothyroxine  25 mcg Oral Daily    metoprolol succinate  25 mg Oral Daily    rosuvastatin  20 mg Oral Nightly    aspirin  81 mg Oral Daily    budesonide-formoterol  2 puff Inhalation BID RT    losartan  12.5 mg Oral Daily    loxapine  10 mg Oral Nightly     Continuous Infusions:   sodium chloride 100 mL/hr at 12/22/24 1424    dextrose      sodium chloride       PRN Meds:.glucose, dextrose bolus **OR** dextrose bolus, glucagon (rDNA), dextrose, sodium chloride flush, sodium chloride, potassium chloride **OR** potassium alternative oral replacement **OR** potassium chloride, magnesium sulfate, ondansetron, acetaminophen, albuterol, diphenhydrAMINE, melatonin, docusate sodium     CBC:   Recent Labs     12/20/24  1317 12/21/24  0454 12/22/24  0523   WBC 8.8 7.8 10.3   HGB 12.3 11.9 13.4    211 216     BMP:    Recent Labs     12/22/24  0523 12/22/24  0858 12/22/24  1254   * 129* 128*   K 3.6* 3.9 3.7   CL 90* 91* 91*   CO2 25 25 22   BUN 5* 5* 5*   CREATININE 0.7 0.8 0.8   GLUCOSE 128* 119* 212*     Hepatic:   Recent Labs     12/20/24  1317   AST 18   ALT 10   BILITOT 0.5   ALKPHOS 125*     Troponin: No results for input(s): \"TROPONINI\" in the last 72 hours.  BNP: No results for input(s): \"BNP\"

## 2024-12-23 VITALS
WEIGHT: 156 LBS | DIASTOLIC BLOOD PRESSURE: 88 MMHG | BODY MASS INDEX: 25.07 KG/M2 | RESPIRATION RATE: 16 BRPM | HEIGHT: 66 IN | SYSTOLIC BLOOD PRESSURE: 132 MMHG | HEART RATE: 84 BPM | OXYGEN SATURATION: 94 % | TEMPERATURE: 98.8 F

## 2024-12-23 LAB
ANION GAP SERPL CALCULATED.3IONS-SCNC: 11 MMOL/L (ref 9–17)
BASOPHILS # BLD: 0.06 K/UL (ref 0–0.2)
BASOPHILS NFR BLD: 1 % (ref 0–2)
BUN SERPL-MCNC: 5 MG/DL (ref 8–23)
BUN/CREAT SERPL: 6 (ref 9–20)
CALCIUM SERPL-MCNC: 9 MG/DL (ref 8.6–10.4)
CHLORIDE SERPL-SCNC: 95 MMOL/L (ref 98–107)
CO2 SERPL-SCNC: 24 MMOL/L (ref 20–31)
CREAT SERPL-MCNC: 0.8 MG/DL (ref 0.5–0.9)
EKG ATRIAL RATE: 58 BPM
EKG P AXIS: 39 DEGREES
EKG P-R INTERVAL: 178 MS
EKG Q-T INTERVAL: 464 MS
EKG QRS DURATION: 86 MS
EKG QTC CALCULATION (BAZETT): 455 MS
EKG R AXIS: -46 DEGREES
EKG T AXIS: -13 DEGREES
EKG VENTRICULAR RATE: 58 BPM
EOSINOPHIL # BLD: 0.12 K/UL (ref 0–0.44)
EOSINOPHILS RELATIVE PERCENT: 1 % (ref 1–4)
ERYTHROCYTE [DISTWIDTH] IN BLOOD BY AUTOMATED COUNT: 12.6 % (ref 11.8–14.4)
EST. AVERAGE GLUCOSE BLD GHB EST-MCNC: 111 MG/DL
GFR, ESTIMATED: 82 ML/MIN/1.73M2
GLUCOSE SERPL-MCNC: 125 MG/DL (ref 70–99)
HBA1C MFR BLD: 5.5 % (ref 4–6)
HCT VFR BLD AUTO: 37.8 % (ref 36.3–47.1)
HGB BLD-MCNC: 13 G/DL (ref 11.9–15.1)
IMM GRANULOCYTES # BLD AUTO: 0.05 K/UL (ref 0–0.3)
IMM GRANULOCYTES NFR BLD: 1 %
LYMPHOCYTES NFR BLD: 1.8 K/UL (ref 1.1–3.7)
LYMPHOCYTES RELATIVE PERCENT: 17 % (ref 24–43)
MAGNESIUM SERPL-MCNC: 1.9 MG/DL (ref 1.6–2.6)
MCH RBC QN AUTO: 29.1 PG (ref 25.2–33.5)
MCHC RBC AUTO-ENTMCNC: 34.4 G/DL (ref 25.2–33.5)
MCV RBC AUTO: 84.8 FL (ref 82.6–102.9)
MONOCYTES NFR BLD: 0.68 K/UL (ref 0.1–1.2)
MONOCYTES NFR BLD: 6 % (ref 3–12)
NEUTROPHILS NFR BLD: 74 % (ref 36–65)
NEUTS SEG NFR BLD: 7.98 K/UL (ref 1.5–8.1)
NRBC BLD-RTO: 0 PER 100 WBC
PLATELET # BLD AUTO: 230 K/UL (ref 138–453)
PMV BLD AUTO: 8.7 FL (ref 8.1–13.5)
POTASSIUM SERPL-SCNC: 3.6 MMOL/L (ref 3.7–5.3)
RBC # BLD AUTO: 4.46 M/UL (ref 3.95–5.11)
SODIUM SERPL-SCNC: 130 MMOL/L (ref 135–144)
WBC OTHER # BLD: 10.7 K/UL (ref 3.5–11.3)

## 2024-12-23 PROCEDURE — 6370000000 HC RX 637 (ALT 250 FOR IP): Performed by: INTERNAL MEDICINE

## 2024-12-23 PROCEDURE — 6370000000 HC RX 637 (ALT 250 FOR IP): Performed by: FAMILY MEDICINE

## 2024-12-23 PROCEDURE — 83735 ASSAY OF MAGNESIUM: CPT

## 2024-12-23 PROCEDURE — 36415 COLL VENOUS BLD VENIPUNCTURE: CPT

## 2024-12-23 PROCEDURE — 2580000003 HC RX 258: Performed by: FAMILY MEDICINE

## 2024-12-23 PROCEDURE — 80048 BASIC METABOLIC PNL TOTAL CA: CPT

## 2024-12-23 PROCEDURE — 99238 HOSP IP/OBS DSCHRG MGMT 30/<: CPT | Performed by: INTERNAL MEDICINE

## 2024-12-23 PROCEDURE — 85025 COMPLETE CBC W/AUTO DIFF WBC: CPT

## 2024-12-23 PROCEDURE — 6370000000 HC RX 637 (ALT 250 FOR IP): Performed by: NURSE PRACTITIONER

## 2024-12-23 PROCEDURE — 6360000002 HC RX W HCPCS: Performed by: INTERNAL MEDICINE

## 2024-12-23 PROCEDURE — 2500000003 HC RX 250 WO HCPCS: Performed by: INTERNAL MEDICINE

## 2024-12-23 RX ORDER — SODIUM CHLORIDE 1 G/1
1 TABLET ORAL 3 TIMES DAILY
Qty: 90 TABLET | Refills: 0 | Status: SHIPPED | OUTPATIENT
Start: 2024-12-23

## 2024-12-23 RX ORDER — BETHANECHOL CHLORIDE 25 MG/1
25 TABLET ORAL 4 TIMES DAILY
Qty: 120 TABLET | Refills: 0 | Status: SHIPPED | OUTPATIENT
Start: 2024-12-23

## 2024-12-23 RX ADMIN — ENOXAPARIN SODIUM 40 MG: 100 INJECTION SUBCUTANEOUS at 08:59

## 2024-12-23 RX ADMIN — EMPAGLIFLOZIN 10 MG: 10 TABLET, FILM COATED ORAL at 08:57

## 2024-12-23 RX ADMIN — METOPROLOL SUCCINATE 25 MG: 25 TABLET, EXTENDED RELEASE ORAL at 08:57

## 2024-12-23 RX ADMIN — GLIPIZIDE 5 MG: 5 TABLET ORAL at 06:12

## 2024-12-23 RX ADMIN — LOSARTAN POTASSIUM 12.5 MG: 25 TABLET, FILM COATED ORAL at 08:58

## 2024-12-23 RX ADMIN — POLYETHYLENE GLYCOL 3350 17 G: 17 POWDER, FOR SOLUTION ORAL at 08:58

## 2024-12-23 RX ADMIN — SODIUM CHLORIDE: 9 INJECTION, SOLUTION INTRAVENOUS at 04:34

## 2024-12-23 RX ADMIN — ASPIRIN 81 MG: 81 TABLET, COATED ORAL at 08:58

## 2024-12-23 RX ADMIN — BETHANECHOL CHLORIDE 25 MG: 25 TABLET ORAL at 16:08

## 2024-12-23 RX ADMIN — BENZTROPINE MESYLATE 1 MG: 1 TABLET ORAL at 08:57

## 2024-12-23 RX ADMIN — LEVOTHYROXINE SODIUM 25 MCG: 0.03 TABLET ORAL at 06:12

## 2024-12-23 RX ADMIN — SODIUM CHLORIDE, PRESERVATIVE FREE 10 ML: 5 INJECTION INTRAVENOUS at 08:58

## 2024-12-23 RX ADMIN — BETHANECHOL CHLORIDE 25 MG: 25 TABLET ORAL at 08:58
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
## 2024-12-23 NOTE — PROGRESS NOTES
CLINICAL PHARMACY NOTE: MEDS TO BEDS    Total # of Prescriptions Filled: 2   The following medications were delivered to the patient:  Sodium Chloride Tab  Bethanechol    Additional Documentation:

## 2024-12-23 NOTE — PLAN OF CARE
Problem: Chronic Conditions and Co-morbidities  Goal: Patient's chronic conditions and co-morbidity symptoms are monitored and maintained or improved  12/21/2024 2012 by Mis Marion RN  Outcome: Progressing  12/21/2024 1232 by Joya Mcgee RN  Outcome: Progressing     Problem: Discharge Planning  Goal: Discharge to home or other facility with appropriate resources  12/21/2024 2012 by Mis Marion RN  Outcome: Progressing  12/21/2024 1232 by Joya Mcgee RN  Outcome: Progressing  Flowsheets (Taken 12/21/2024 1048)  Discharge to home or other facility with appropriate resources: Identify barriers to discharge with patient and caregiver     Problem: Pain  Goal: Verbalizes/displays adequate comfort level or baseline comfort level  12/21/2024 2012 by Mis Marion RN  Outcome: Progressing  12/21/2024 1232 by Joya Mcgee RN  Outcome: Progressing     Problem: Safety - Adult  Goal: Free from fall injury  12/21/2024 2012 by Mis Marion RN  Outcome: Progressing  12/21/2024 1232 by Joya Mcgee RN  Outcome: Progressing     
  Problem: Chronic Conditions and Co-morbidities  Goal: Patient's chronic conditions and co-morbidity symptoms are monitored and maintained or improved  12/22/2024 2310 by Imani Wilcox RN  Outcome: Progressing  12/22/2024 1240 by Joya Mcgee RN  Outcome: Progressing     Problem: Discharge Planning  Goal: Discharge to home or other facility with appropriate resources  12/22/2024 2310 by Imani Wilcox, RN  Outcome: Progressing  12/22/2024 1240 by Joya Mcgee RN  Outcome: Progressing  Flowsheets (Taken 12/22/2024 0810)  Discharge to home or other facility with appropriate resources: Identify barriers to discharge with patient and caregiver     Problem: Pain  Goal: Verbalizes/displays adequate comfort level or baseline comfort level  12/22/2024 2310 by Imani Wilcox, RN  Outcome: Progressing  12/22/2024 1240 by Joya Mcgee RN  Outcome: Progressing     Problem: Safety - Adult  Goal: Free from fall injury  12/22/2024 2310 by Imani Wilcox, RN  Outcome: Progressing  12/22/2024 1240 by Joya Mcgee, RN  Outcome: Progressing     Problem: Skin/Tissue Integrity  Goal: Absence of new skin breakdown  12/22/2024 2310 by Imani Wilcox RN  Outcome: Progressing  12/22/2024 1240 by Joya Mcgee, RN  Outcome: Progressing     
  Problem: Chronic Conditions and Co-morbidities  Goal: Patient's chronic conditions and co-morbidity symptoms are monitored and maintained or improved  12/22/2024 2310 by Imani Wilcox RN  Outcome: Progressing  12/22/2024 1240 by Joya Mcgee RN  Outcome: Progressing     Problem: Discharge Planning  Goal: Discharge to home or other facility with appropriate resources  12/22/2024 2310 by Imani Wilcox, RN  Outcome: Progressing  12/22/2024 1240 by Joya Mcgee RN  Outcome: Progressing  Flowsheets (Taken 12/22/2024 0810)  Discharge to home or other facility with appropriate resources: Identify barriers to discharge with patient and caregiver     Problem: Pain  Goal: Verbalizes/displays adequate comfort level or baseline comfort level  12/22/2024 2310 by Imani Wilcox, RN  Outcome: Progressing  12/22/2024 1240 by Joya Mcgee RN  Outcome: Progressing     Problem: Safety - Adult  Goal: Free from fall injury  12/22/2024 2310 by Imani Wilcox, RN  Outcome: Progressing  12/22/2024 1240 by Joya Mcgee, RN  Outcome: Progressing     Problem: Skin/Tissue Integrity  Goal: Absence of new skin breakdown  12/22/2024 2310 by Imani Wilcox RN  Outcome: Progressing  12/22/2024 1240 by Joya Mcgee, RN  Outcome: Progressing     
  Problem: Chronic Conditions and Co-morbidities  Goal: Patient's chronic conditions and co-morbidity symptoms are monitored and maintained or improved  Outcome: Progressing     Problem: Discharge Planning  Goal: Discharge to home or other facility with appropriate resources  Outcome: Progressing  Flowsheets  Taken 12/20/2024 1658 by Joya Mcgee RN  Discharge to home or other facility with appropriate resources: Identify barriers to discharge with patient and caregiver  Taken 12/20/2024 1648 by Joya Mcgee RN  Discharge to home or other facility with appropriate resources: Identify barriers to discharge with patient and caregiver     Problem: Pain  Goal: Verbalizes/displays adequate comfort level or baseline comfort level  12/20/2024 2303 by Mis Marion RN  Outcome: Progressing  12/20/2024 1651 by Joya Mcgee RN  Outcome: Progressing     Problem: Safety - Adult  Goal: Free from fall injury  12/20/2024 2303 by Mis Marion, RN  Outcome: Progressing  12/20/2024 1651 by Joya Mcgee RN  Outcome: Progressing     
  Problem: Chronic Conditions and Co-morbidities  Goal: Patient's chronic conditions and co-morbidity symptoms are monitored and maintained or improved  Outcome: Progressing     Problem: Discharge Planning  Goal: Discharge to home or other facility with appropriate resources  Outcome: Progressing  Flowsheets (Taken 12/22/2024 0810)  Discharge to home or other facility with appropriate resources: Identify barriers to discharge with patient and caregiver     Problem: Pain  Goal: Verbalizes/displays adequate comfort level or baseline comfort level  Outcome: Progressing     Problem: Safety - Adult  Goal: Free from fall injury  Outcome: Progressing     Problem: Skin/Tissue Integrity  Goal: Absence of new skin breakdown  Description: 1.  Monitor for areas of redness and/or skin breakdown  2.  Assess vascular access sites hourly  3.  Every 4-6 hours minimum:  Change oxygen saturation probe site  4.  Every 4-6 hours:  If on nasal continuous positive airway pressure, respiratory therapy assess nares and determine need for appliance change or resting period.  Outcome: Progressing     
  Problem: Pain  Goal: Verbalizes/displays adequate comfort level or baseline comfort level  Outcome: Progressing     Problem: Safety - Adult  Goal: Free from fall injury  Outcome: Progressing     Problem: Skin/Tissue Integrity  Goal: Absence of new skin breakdown  Description: 1.  Monitor for areas of redness and/or skin breakdown  2.  Assess vascular access sites hourly  3.  Every 4-6 hours minimum:  Change oxygen saturation probe site  4.  Every 4-6 hours:  If on nasal continuous positive airway pressure, respiratory therapy assess nares and determine need for appliance change or resting period.  Outcome: Progressing     
RN  Outcome: Progressing

## 2024-12-23 NOTE — CARE COORDINATION
Case Management Assessment  Initial Evaluation    Date/Time of Evaluation: 12/23/2024 9:42 AM  Assessment Completed by: Saranya Barriga RN    If patient is discharged prior to next notation, then this note serves as note for discharge by case management.    Patient Name: Zoila Rick                   YOB: 1960  Diagnosis: Hypokalemia [E87.6]  Urinary retention [R33.9]  Hyponatremia [E87.1]                   Date / Time: 12/20/2024 12:31 PM    Patient Admission Status: Inpatient   Readmission Risk (Low < 19, Mod (19-27), High > 27): Readmission Risk Score: 9.1    Current PCP: Zoila Evans APRN - NP  PCP verified by CM? Yes    Chart Reviewed: Yes      History Provided by: Patient  Patient Orientation: Alert and Oriented    Patient Cognition: Alert    Hospitalization in the last 30 days (Readmission):  No    If yes, Readmission Assessment in CM Navigator will be completed.    Advance Directives:      Code Status: Full Code   Patient's Primary Decision Maker is:        Discharge Planning:    Patient lives with: Spouse/Significant Other Type of Home: Apartment  Primary Care Giver: Self  Patient Support Systems include: Other (Comment) ( Emily Hinojosa)   Current Financial resources: [employer] [place], Medicare  Current community resources: None  Current services prior to admission: None            Current DME:              Type of Home Care services:  None    ADLS  Prior functional level: Independent in ADLs/IADLs, Bathing, Dressing, Toileting, Feeding, Cooking, Housework  Current functional level: Bathing, Dressing, Toileting, Feeding, Cooking, Housework, Independent in ADLs/IADLs    PT AM-PAC:   /24  OT AM-PAC:   /24    Family can provide assistance at DC: Yes  Would you like Case Management to discuss the discharge plan with any other family members/significant others, and if so, who? No  Plans to Return to Present Housing: Yes  Other Identified Issues/Barriers to RETURNING to current

## 2024-12-23 NOTE — DISCHARGE INSTRUCTIONS
Follow up with Urology in 1 week.     Follow up with Zoila Evans CNP at scheduled appointment December 26th.

## 2024-12-23 NOTE — PROGRESS NOTES
* No resolved hospital problems. *    CEASAR SIEGEL                [noble]            64  WF  [Ceasar Evans NP---[place];  [facility]]  FULL CODE     LOVENOX     HERNANDEZ    Anti-infectives:  ----    Hyponatremia---12.20.2024---            Loxitane--loxapine related hyponatremia            Post--obstructive diuresis             Polydipsia   Hypokalemia---12.20.2024  Urinary retention--recurrent---12.20.2024---prior--8.7.2019  SIADH  Buttock wound--right sided---perianal dermatitis--Stage 1 ulcer  Intentional 30# weight loss            CT AP---12.20.204---fullness right collecting system--mild reflux--left adrenal adenitis    HTN         EKG--8.7.2019---NSR---90---sinus arrhythmia--NACs         EKG---8.6.2019--NSR---91--NSSTTCs  ASCVD--by imaging  Hyperlipidemia   CKD---Stage 1  COPD            Emphysema     Psychiatric           Schizophrenia          Schizoaffective disorder--Bipolar Type          Depression-episodic outburst hitting bed--screaming---otherwise laconic--ignores          Medication-induced postural tremor    Diabetes Mellitus Type 2  Hypothyroidism  Generalized muscle weakness  Overweight   Tobacco abuse---1 PPD----onset--3.6.1979----quit--8.1.2024    PMH:  see above, pneumonia---2019, altered mental status--8.6.2019--             increasing confusion at home, fatty liver, chronic constipation, ruptured appendix  PSH:   cholecystectomy, colonoscopy--2019--2020---2021---tattooing             sigmoid colon mass, BTL--tubal ligation--1985, umbilical hernia repair, appendectomy    Allergies:  Bicillin--Penicillin G---dyspnea--hives--urticaria, Geodon--ziprasidone---hives--urticaria      Plan:    Sodium replacement---tablets and drinks    Potassium replacement    Urinary retention--TOV if successful no hernandez---if fails, hernandez-----Urology outpatient    Levaquin-----dc'd    Follow up Ceasar Evans NP---[place]    See orders     Electronically signed by Frank Rain MD on

## 2024-12-23 NOTE — PROGRESS NOTES
Nurse educated pt on irizarry care at home and how to empty urine from bag. Pt performed returned demonstration 3 different times with nurse.

## 2024-12-23 NOTE — DISCHARGE INSTR - DIET
Good nutrition is important when healing from an illness, injury, or surgery.  Follow any nutrition recommendations given to you during your hospital stay.   If you were given an oral nutrition supplement while in the hospital, continue to take this supplement at home.  You can take it with meals, in-between meals, and/or before bedtime. These supplements can be purchased at most local grocery stores, pharmacies, and chain [employer]-stores.   If you have any questions about your diet or nutrition, call the hospital and ask for the dietitian.  Regular Diabetic Diet.   Gatorade with all meals.

## 2024-12-24 NOTE — DISCHARGE SUMMARY
[address] [place] [address]                            DISCHARGE SUMMARY      PATIENT NAME: ZOILA SIEGEL               : 1960  MED REC NO: [id number]                         ROOM: 0204  ACCOUNT NO: [id number]                       ADMIT DATE: 2024  PROVIDER: Frank Rain MD      PRIMARY CARE PHYSICIAN:  Zoila Evans, Nurse Practitioner    The patient was also seen by [facility].    DIAGNOSES:    1. Hyponatremia, 2024, possibly Loxitane, loxapine, related hyponatremia, postobstructive diuresis, and polydipsia.  2. Hypokalemia.  Replacement therapies given.  3. Urinary retention, recurrent, 2024.  Previously, the patient recurrent failed voiding trial, 2023.  Will be discharged to home with a Issa catheter on Urecholine.  4. Buttock wound, right-sided perianal dermatitis, stage I, treated.  5. Intentional 30 pounds weight loss.  CTAP 2024, fullness of the right collecting system, mild reflux, left adrenal adenitis.  6. Hypertension.  7. Coronary artery disease by imaging.  8. Hyperlipidemia.  9. Chronic kidney disease stage 1 to stage II.  10. Chronic obstructive pulmonary disease, emphysema.  11. Psychiatric schizophrenia; schizoaffective disorder, bipolar type; depression with episodic outburst.  12. Medication induced postural tremor.  13. Diabetes mellitus type 2.  14. Hypothyroidism.  Generalized muscle weakness.  15. Overweight.  16. Tobacco abuse, 1 pack a day, quit 2024.  17. Other medical problems set forth in the progress note of 2024 incorporated for reference herein.    HISTORY OF PRESENT ILLNESS AND HOSPITAL COURSE:  The patient is a 64-year-old white female, patient of Zoila Evans, nurse practitioner, [place], [place].    The patient presented with hyponatremia.  The patient has received sodium replacement, now up to 130.  The patient takes loxapine which

## 2025-01-27 ENCOUNTER — OFFICE VISIT (OUTPATIENT)
Dept: UROLOGY | Age: 65
End: 2025-01-27
Payer: MEDICARE

## 2025-01-27 VITALS
SYSTOLIC BLOOD PRESSURE: 124 MMHG | DIASTOLIC BLOOD PRESSURE: 70 MMHG | WEIGHT: 156 LBS | HEIGHT: 66 IN | HEART RATE: 69 BPM | BODY MASS INDEX: 25.07 KG/M2

## 2025-01-27 DIAGNOSIS — R33.9 URINARY RETENTION: Primary | ICD-10-CM

## 2025-01-27 PROCEDURE — 99214 OFFICE O/P EST MOD 30 MIN: CPT | Performed by: UROLOGY

## 2025-01-27 PROCEDURE — 1036F TOBACCO NON-USER: CPT | Performed by: UROLOGY

## 2025-01-27 PROCEDURE — 3017F COLORECTAL CA SCREEN DOC REV: CPT | Performed by: UROLOGY

## 2025-01-27 PROCEDURE — G8427 DOCREV CUR MEDS BY ELIG CLIN: HCPCS | Performed by: UROLOGY

## 2025-01-27 PROCEDURE — G8417 CALC BMI ABV UP PARAM F/U: HCPCS | Performed by: UROLOGY

## 2025-01-27 PROCEDURE — 99204 OFFICE O/P NEW MOD 45 MIN: CPT | Performed by: UROLOGY

## 2025-01-27 NOTE — PROGRESS NOTES
Deja Gallardo MD.    [facility]  [address] [facility] [address]  Dept: [phone]  Dept Fax: [phone]    [facility] Urology Office Note -     Patient:  Zoila Rick  YOB: 1960    The patient is a 64 y.o. female who presents today for evaluation of the following problems:   Chief Complaint   Patient presents with    Urinary Retention     Does have irizarry    referred/consultation requested by Zoila Evans APRN - NP.    History of Present Illness:    Urinary retention  Catheter for > 1 month  Here to discuss next steps  Reviewed ct        Requested/reviewed records from Zoila Evans APRN - NP office and/or outside physician/EMR    (Patient's old records have been requested, reviewed and pertinent findings summarized in today's note.)    Procedures Today: N/A      Last several PSA's:  No results found for: \"PSA\"    Last total testosterone:  No results found for: \"TESTOSTERONE\"    Urinalysis today:  No results found for this visit on 01/27/25.    Last BUN and creatinine:  Lab Results   Component Value Date    BUN 5 (L) 12/23/2024     Lab Results   Component Value Date    CREATININE 0.8 12/23/2024         Imaging Reviewed during this Office Visit:   DEJA GALLARDO MD independently reviewed the images and verified the radiology reports from:    CT ABDOMEN PELVIS WO CONTRAST Additional Contrast? None    Result Date: 12/20/2024  EXAMINATION: CT OF THE ABDOMEN AND PELVIS WITHOUT CONTRAST 12/20/2024 1:53 pm TECHNIQUE: CT of the abdomen and pelvis was performed without the administration of intravenous contrast. Multiplanar reformatted images are provided for review. Automated exposure control, iterative reconstruction, and/or weight based adjustment of the mA/kV was utilized to reduce the radiation dose to as low as reasonably achievable. COMPARISON: December 28, 2023

## 2025-03-08 ENCOUNTER — HOSPITAL ENCOUNTER (EMERGENCY)
Age: 65
Discharge: HOME | End: 2025-03-08
Attending: EMERGENCY MEDICINE
Payer: MEDICARE

## 2025-03-08 VITALS
TEMPERATURE: 97.2 F | SYSTOLIC BLOOD PRESSURE: 97 MMHG | RESPIRATION RATE: 16 BRPM | HEART RATE: 73 BPM | DIASTOLIC BLOOD PRESSURE: 61 MMHG | WEIGHT: 163 LBS | BODY MASS INDEX: 26.71 KG/M2 | OXYGEN SATURATION: 98 %

## 2025-03-08 DIAGNOSIS — R33.8 ACUTE URINARY RETENTION: Primary | ICD-10-CM

## 2025-03-08 LAB
BACTERIA URNS QL MICRO: ABNORMAL
BILIRUB UR QL STRIP: NEGATIVE
CHARACTER UR: ABNORMAL
CLARITY UR: CLEAR
COLOR UR: YELLOW
EPI CELLS #/AREA URNS HPF: ABNORMAL /HPF (ref 0–5)
GLUCOSE UR STRIP-MCNC: ABNORMAL MG/DL
HGB UR QL STRIP.AUTO: NEGATIVE
KETONES UR STRIP-MCNC: NEGATIVE MG/DL
LEUKOCYTE ESTERASE UR QL STRIP: ABNORMAL
NITRITE UR QL STRIP: NEGATIVE
PH UR STRIP: 6 [PH] (ref 5–6)
PROT UR STRIP-MCNC: NEGATIVE MG/DL
RBC #/AREA URNS HPF: ABNORMAL /HPF (ref 0–4)
SP GR UR STRIP: 1.01 (ref 1.01–1.02)
UROBILINOGEN UR STRIP-ACNC: NORMAL EU/DL (ref 0–1)
WBC #/AREA URNS HPF: ABNORMAL /HPF (ref 0–4)

## 2025-03-08 PROCEDURE — 51798 US URINE CAPACITY MEASURE: CPT

## 2025-03-08 PROCEDURE — 81001 URINALYSIS AUTO W/SCOPE: CPT

## 2025-03-08 PROCEDURE — 51702 INSERT TEMP BLADDER CATH: CPT

## 2025-03-08 PROCEDURE — 99283 EMERGENCY DEPT VISIT LOW MDM: CPT

## 2025-03-08 ASSESSMENT — PAIN - FUNCTIONAL ASSESSMENT
PAIN_FUNCTIONAL_ASSESSMENT: NONE - DENIES PAIN
PAIN_FUNCTIONAL_ASSESSMENT: NONE - DENIES PAIN

## 2025-03-08 NOTE — ED PROVIDER NOTES
concerned of urinary retention.  I will start off simply with Issa catheter and this completely resolves her symptoms, I feel they were fine to stop simply with urinary retention.  She will be evaluated for urinary tract infection.    PLAN:     Orders Placed This Encounter   Procedures    Urinalysis with Reflex to Culture    Microscopic Urinalysis    Insert indwelling urinary catheter    Bladder scan       MEDICATIONS ORDERED:   No orders of the defined types were placed in this encounter.      DIAGNOSTIC RESULTS:       DEPARTMENT VITAL SIGNS:     Vitals:    03/08/25 0743 03/08/25 0745 03/08/25 0746 03/08/25 0747   BP: 119/68  124/75    Pulse: 73      Resp: 16      Temp: 97.2 °F (36.2 °C)      SpO2: 99% 100% 97%    Weight:    73.9 kg (163 lb)     BP: 124/75, Temp: 97.2 °F (36.2 °C), Pulse: 73, Respirations: 16     DISPOSITION NOTE:   8:14 AM EST  Patient is completely symptom-free now after Issa catheter and she has had nearly a liter out.    8:33 AM EST  Urinalysis positive for leukocyte Estrace however there is no bacteria in her urine.  I will await the culture.  She has an appointment already scheduled this Tuesday with urology.  Have encouraged her to keep that appointment.  She has been fitted with a leg bag and will be discharged home.    FINAL IMPRESSION:     1. Acute urinary retention        DISPOSITION:   Decision To Discharge    PATIENT REFERRED TO:   Urology    Go on 3/11/2025        DISCHARGE MEDICATIONS:     Current Discharge Medication List            Jun Evans MD   Emergency Physician Attending    (Please note that portions of this note were completed with a voice recognition program.  Efforts were made to edit the dictations but occasionally words are mis-transcribed.)        Jun Evans MD  03/08/25 0872

## 2025-03-11 ENCOUNTER — OFFICE VISIT (OUTPATIENT)
Dept: PODIATRY | Age: 65
End: 2025-03-11
Payer: MEDICARE

## 2025-03-11 ENCOUNTER — OFFICE VISIT (OUTPATIENT)
Dept: UROLOGY | Age: 65
End: 2025-03-11
Payer: MEDICARE

## 2025-03-11 VITALS
WEIGHT: 163 LBS | RESPIRATION RATE: 16 BRPM | HEIGHT: 65 IN | DIASTOLIC BLOOD PRESSURE: 70 MMHG | BODY MASS INDEX: 27.16 KG/M2 | HEART RATE: 70 BPM | SYSTOLIC BLOOD PRESSURE: 114 MMHG | OXYGEN SATURATION: 98 %

## 2025-03-11 VITALS
HEART RATE: 76 BPM | SYSTOLIC BLOOD PRESSURE: 120 MMHG | HEIGHT: 65 IN | BODY MASS INDEX: 28.32 KG/M2 | DIASTOLIC BLOOD PRESSURE: 80 MMHG | WEIGHT: 170 LBS

## 2025-03-11 DIAGNOSIS — E11.51 CONTROLLED TYPE 2 DM WITH PERIPHERAL CIRCULATORY DISORDER (HCC): Primary | ICD-10-CM

## 2025-03-11 DIAGNOSIS — R33.9 URINARY RETENTION: Primary | ICD-10-CM

## 2025-03-11 DIAGNOSIS — B35.1 DERMATOPHYTOSIS OF NAIL: ICD-10-CM

## 2025-03-11 DIAGNOSIS — K59.00 CONSTIPATION, UNSPECIFIED CONSTIPATION TYPE: ICD-10-CM

## 2025-03-11 PROCEDURE — 11720 DEBRIDE NAIL 1-5: CPT | Performed by: PODIATRIST

## 2025-03-11 PROCEDURE — G8427 DOCREV CUR MEDS BY ELIG CLIN: HCPCS | Performed by: NURSE PRACTITIONER

## 2025-03-11 PROCEDURE — 99999 PR OFFICE/OUTPT VISIT,PROCEDURE ONLY: CPT | Performed by: PODIATRIST

## 2025-03-11 PROCEDURE — 1036F TOBACCO NON-USER: CPT | Performed by: NURSE PRACTITIONER

## 2025-03-11 PROCEDURE — G8417 CALC BMI ABV UP PARAM F/U: HCPCS | Performed by: NURSE PRACTITIONER

## 2025-03-11 PROCEDURE — 99212 OFFICE O/P EST SF 10 MIN: CPT | Performed by: NURSE PRACTITIONER

## 2025-03-11 PROCEDURE — 99213 OFFICE O/P EST LOW 20 MIN: CPT | Performed by: NURSE PRACTITIONER

## 2025-03-11 PROCEDURE — 3017F COLORECTAL CA SCREEN DOC REV: CPT | Performed by: NURSE PRACTITIONER

## 2025-03-11 NOTE — PROGRESS NOTES
[facility]  [address] [facility] [address]  Dept: [phone]    Visit Date: 3/11/2025        HPI:     Zoila Rick is a 64 y.o. female who presents today for:  Chief Complaint   Patient presents with    Urinary Retention     Had catheter put back in on 3/8/25       HPI  Patient presents to urology clinic for follow-up.     Zoila was seen in ER on 3/8/25 for urinary retention. 1L bladder scan noted, irizarry was placed. Urine negative for infection. Irizarry was initially removed on 1/27/25. Irizarry draining well, no complaints per patient.   Denies flank pain, suprapubic pressure, gross hematuria, dysuria, fever or chills.     Urinary retention  Catheter for > 1 month    Current Outpatient Medications   Medication Sig Dispense Refill    bethanechol (URECHOLINE) 25 MG tablet Take 1 tablet by mouth 4 times daily 120 tablet 0    sodium chloride 1 g tablet Take 1 tablet by mouth 3 times daily 90 tablet 0    albuterol sulfate HFA (PROVENTIL;VENTOLIN;PROAIR) 108 (90 Base) MCG/ACT inhaler Inhale into the lungs every 4 hours as needed      aspirin 81 MG EC tablet Take 1 tablet by mouth daily      fluticasone-salmeterol (ADVAIR) 100-50 MCG/ACT AEPB diskus inhaler Take 1 puff by mouth 2 times daily      polyethylene glycol (GLYCOLAX) 17 GM/SCOOP powder Take 17 g by mouth daily      docusate sodium (COLACE) 100 MG capsule Take 1 capsule by mouth 2 times daily as needed for Constipation      ibuprofen (ADVIL;MOTRIN) 800 MG tablet Take 1 tablet by mouth 2 times daily Taken with 500 mg tylenol      acetaminophen (TYLENOL) 500 MG tablet Take 1 tablet by mouth in the morning and at bedtime Taken with 800 mg motrin      JANUVIA 100 MG tablet Take 1 tablet by mouth daily      losartan (COZAAR) 25 MG tablet Take 0.5 tablets by mouth daily      metoprolol succinate (TOPROL XL) 25 MG extended release tablet

## 2025-03-11 NOTE — PROGRESS NOTES
Foot Care Worksheet  PCP: Zoila Evans APRN - NP  Last visit: 2/6/25      Nail description: Thick , Yellow , Crumbly , Marked limitation of ambulation     Pain resulting from thickened and dystrophy of nail plate No    Nails involved  Right   1  (T5-T9)  Left    1, 2  (TA-T4)    Q7 1 Class A Finding - Non traumatic amputation of foot No    Q8 2 Class B Findings - Absent DP pulse No, Absent PT pulse No, Advanced tropic changes (3 required) Yes    Decrease hair growth Yes, Nail changes/thickening Yes, Pigmented changes/discoloration Yes, Skin texture (thin, shiny) No, Skin color (rubor/redness) No    Q9 1 Class B and 2 Class C Findings  Claudication No, Temperature change Yes, Paresthesia No, Burning No, Edema Yes    Subjective:  [facility] for [facility] care.  Patient denies any new problems with their feet.  Allergies   Allergen Reactions    Bicillin [Penicillin G Benzathine] Shortness Of Breath    Penicillins      Other reaction(s): PENICILLINS    Ziprasidone Other (See Comments)     \"I can't sit still\"  Pt could not sit still, hyper         Past Medical History:   Diagnosis Date    Depression     Diabetes mellitus (HCC)     Hyperlipidemia     Hypertension     Thyroid disease     Hypothyroid    Toxic metabolic encephalopathy        Prior to Admission medications    Medication Sig Start Date End Date Taking? Authorizing Provider   bethanechol (URECHOLINE) 25 MG tablet Take 1 tablet by mouth 4 times daily 12/23/24  Yes Frank Rain MD   sodium chloride 1 g tablet Take 1 tablet by mouth 3 times daily 12/23/24  Yes Frank Rain MD   albuterol sulfate HFA (PROVENTIL;VENTOLIN;PROAIR) 108 (90 Base) MCG/ACT inhaler Inhale into the lungs every 4 hours as needed 11/26/24  Yes Victorina Hernandez MD   aspirin 81 MG EC tablet Take 1 tablet by mouth daily 3/15/24  Yes Victorina Hernandez MD   fluticasone-salmeterol (ADVAIR) 100-50 MCG/ACT AEPB diskus inhaler Take 1 puff by

## 2025-03-12 ENCOUNTER — HOSPITAL ENCOUNTER (OUTPATIENT)
Dept: ULTRASOUND IMAGING | Age: 65
Discharge: HOME | End: 2025-03-14
Payer: MEDICARE

## 2025-03-12 DIAGNOSIS — R33.9 URINARY RETENTION: ICD-10-CM

## 2025-03-12 PROCEDURE — 76770 US EXAM ABDO BACK WALL COMP: CPT

## 2025-03-17 ENCOUNTER — OFFICE VISIT (OUTPATIENT)
Dept: UROLOGY | Age: 65
End: 2025-03-17
Payer: MEDICARE

## 2025-03-17 VITALS
OXYGEN SATURATION: 99 % | BODY MASS INDEX: 28.32 KG/M2 | DIASTOLIC BLOOD PRESSURE: 76 MMHG | HEIGHT: 65 IN | WEIGHT: 170 LBS | HEART RATE: 75 BPM | SYSTOLIC BLOOD PRESSURE: 122 MMHG | RESPIRATION RATE: 16 BRPM

## 2025-03-17 DIAGNOSIS — K59.00 CONSTIPATION, UNSPECIFIED CONSTIPATION TYPE: ICD-10-CM

## 2025-03-17 DIAGNOSIS — R33.9 URINARY RETENTION: Primary | ICD-10-CM

## 2025-03-17 PROCEDURE — G8427 DOCREV CUR MEDS BY ELIG CLIN: HCPCS | Performed by: NURSE PRACTITIONER

## 2025-03-17 PROCEDURE — 99212 OFFICE O/P EST SF 10 MIN: CPT | Performed by: NURSE PRACTITIONER

## 2025-03-17 PROCEDURE — G8417 CALC BMI ABV UP PARAM F/U: HCPCS | Performed by: NURSE PRACTITIONER

## 2025-03-17 PROCEDURE — 99213 OFFICE O/P EST LOW 20 MIN: CPT | Performed by: NURSE PRACTITIONER

## 2025-03-17 PROCEDURE — 3017F COLORECTAL CA SCREEN DOC REV: CPT | Performed by: NURSE PRACTITIONER

## 2025-03-17 PROCEDURE — 1036F TOBACCO NON-USER: CPT | Performed by: NURSE PRACTITIONER

## 2025-03-17 NOTE — PROGRESS NOTES
[facility]  [address] [facility] [address]  Dept: [phone]    Visit Date: 3/17/2025        HPI:     Zoila Rick is a 64 y.o. female who presents today for:  Chief Complaint   Patient presents with    Urinary Retention       HPI  Patient presents to urology clinic for follow-up.     Zoila was seen in ER on 3/8/25 for urinary retention. 1L bladder scan noted, irizarry was placed. Irizarry was initially removed on 1/27/25. Irizarry draining well, she is requesting removal.      No BM in the last week. We discussed risk of urinary retention with constipation.      Denies flank pain, suprapubic pressure, gross hematuria, dysuria, fever or chills.      Urinary retention  Catheter for > 1 month    Current Outpatient Medications   Medication Sig Dispense Refill    bethanechol (URECHOLINE) 25 MG tablet Take 1 tablet by mouth 4 times daily 120 tablet 0    sodium chloride 1 g tablet Take 1 tablet by mouth 3 times daily 90 tablet 0    albuterol sulfate HFA (PROVENTIL;VENTOLIN;PROAIR) 108 (90 Base) MCG/ACT inhaler Inhale into the lungs every 4 hours as needed      aspirin 81 MG EC tablet Take 1 tablet by mouth daily      fluticasone-salmeterol (ADVAIR) 100-50 MCG/ACT AEPB diskus inhaler Take 1 puff by mouth 2 times daily      polyethylene glycol (GLYCOLAX) 17 GM/SCOOP powder Take 17 g by mouth daily      docusate sodium (COLACE) 100 MG capsule Take 1 capsule by mouth 2 times daily as needed for Constipation      ibuprofen (ADVIL;MOTRIN) 800 MG tablet Take 1 tablet by mouth 2 times daily Taken with 500 mg tylenol      acetaminophen (TYLENOL) 500 MG tablet Take 1 tablet by mouth in the morning and at bedtime Taken with 800 mg motrin      JANUVIA 100 MG tablet Take 1 tablet by mouth daily      losartan (COZAAR) 25 MG tablet Take 0.5 tablets by mouth daily      metoprolol succinate (TOPROL XL) 25 MG

## 2025-05-06 ENCOUNTER — HOSPITAL ENCOUNTER (EMERGENCY)
Age: 65
Discharge: HOME | End: 2025-05-06
Attending: EMERGENCY MEDICINE
Payer: MEDICARE

## 2025-05-06 VITALS
DIASTOLIC BLOOD PRESSURE: 76 MMHG | OXYGEN SATURATION: 96 % | WEIGHT: 163 LBS | RESPIRATION RATE: 18 BRPM | BODY MASS INDEX: 27.12 KG/M2 | SYSTOLIC BLOOD PRESSURE: 142 MMHG | HEART RATE: 92 BPM | TEMPERATURE: 97.8 F

## 2025-05-06 DIAGNOSIS — R33.8 ACUTE URINARY RETENTION: Primary | ICD-10-CM

## 2025-05-06 PROCEDURE — 51702 INSERT TEMP BLADDER CATH: CPT

## 2025-05-06 PROCEDURE — 99283 EMERGENCY DEPT VISIT LOW MDM: CPT

## 2025-05-06 PROCEDURE — 51798 US URINE CAPACITY MEASURE: CPT

## 2025-05-06 ASSESSMENT — PAIN DESCRIPTION - DESCRIPTORS: DESCRIPTORS: PRESSURE

## 2025-05-06 ASSESSMENT — PAIN DESCRIPTION - LOCATION: LOCATION: ABDOMEN

## 2025-05-06 ASSESSMENT — PAIN SCALES - GENERAL: PAINLEVEL_OUTOF10: 5

## 2025-05-06 ASSESSMENT — LIFESTYLE VARIABLES
HOW MANY STANDARD DRINKS CONTAINING ALCOHOL DO YOU HAVE ON A TYPICAL DAY: PATIENT DOES NOT DRINK
HOW OFTEN DO YOU HAVE A DRINK CONTAINING ALCOHOL: NEVER

## 2025-05-06 ASSESSMENT — PAIN - FUNCTIONAL ASSESSMENT: PAIN_FUNCTIONAL_ASSESSMENT: 0-10

## 2025-05-06 ASSESSMENT — PAIN DESCRIPTION - ORIENTATION: ORIENTATION: LOWER

## 2025-05-06 NOTE — ED PROVIDER NOTES
[facility] EMERGENCY DEPARTMENT  EMERGENCY DEPARTMENT ENCOUNTER      Pt Name: Zoila Rick  MRN: [id number]  Birthdate 1960  Date of evaluation: 5/6/2025  Provider: Shaun Ford MD    CHIEF COMPLAINT     Chief Complaint   Patient presents with    Urinary Retention     Pt states 0430 was her last urine episode         HISTORY OF PRESENT ILLNESS   (Location/Symptom, Timing/Onset, Context/Setting,Quality, Duration, Modifying Factors, Severity)  Note limiting factors.   Zoila Rick is a 64 y.o. female who presents to the emergency department stating she last voided around 4 this morning and has lower abdominal discomfort.    The history is provided by the patient and medical records.       Nursing Notes werereviewed.    REVIEW OF SYSTEMS    (2-9 systems for level 4, 10 or more for level 5)     Review of Systems    Except as noted above the remainder of the review of systems was reviewed and negative.       PAST MEDICAL HISTORY     Past Medical History:   Diagnosis Date    Depression     Diabetes mellitus (HCC)     Hyperlipidemia     Hypertension     Thyroid disease     Hypothyroid    Toxic metabolic encephalopathy          SURGICALHISTORY       Past Surgical History:   Procedure Laterality Date    ABDOMEN SURGERY      CHOLECYSTECTOMY      COLONOSCOPY  8/12/2019    COLONOSCOPY FLEXIBLE W/ DECOMPRESSION performed by Robles Louise MD at [facility]    COLONOSCOPY N/A 8/16/2019    COLONOSCOPY FLEXIBLE W/ DECOMPRESSION performed by Daniel Alexander MD at [facility]    COLONOSCOPY N/A 10/19/2020    COLONOSCOPY, POLYPECTOMY, SNARE, AND TATTOOING OF SIGMOID COLON MASS SITE performed by Erik Douglas DO at [facility]    TUBAL LIGATION  1985    UMBILICAL HERNIA REPAIR           CURRENT MEDICATIONS       Previous Medications    ACETAMINOPHEN (TYLENOL) 500 MG TABLET    Take 1 tablet by mouth in the morning and at bedtime Taken with 800 mg motrin    ALBUTEROL SULFATE HFA (PROVENTIL;VENTOLIN;PROAIR) 108 (90 BASE)

## 2025-05-09 ENCOUNTER — OFFICE VISIT (OUTPATIENT)
Dept: UROLOGY | Age: 65
End: 2025-05-09
Payer: MEDICARE

## 2025-05-09 VITALS
SYSTOLIC BLOOD PRESSURE: 124 MMHG | HEART RATE: 84 BPM | DIASTOLIC BLOOD PRESSURE: 60 MMHG | HEIGHT: 65 IN | BODY MASS INDEX: 27.16 KG/M2 | WEIGHT: 163 LBS

## 2025-05-09 DIAGNOSIS — R33.9 URINARY RETENTION: Primary | ICD-10-CM

## 2025-05-09 DIAGNOSIS — K59.00 CONSTIPATION, UNSPECIFIED CONSTIPATION TYPE: ICD-10-CM

## 2025-05-09 PROCEDURE — 1036F TOBACCO NON-USER: CPT | Performed by: NURSE PRACTITIONER

## 2025-05-09 PROCEDURE — G8417 CALC BMI ABV UP PARAM F/U: HCPCS | Performed by: NURSE PRACTITIONER

## 2025-05-09 PROCEDURE — 3017F COLORECTAL CA SCREEN DOC REV: CPT | Performed by: NURSE PRACTITIONER

## 2025-05-09 PROCEDURE — 99212 OFFICE O/P EST SF 10 MIN: CPT | Performed by: NURSE PRACTITIONER

## 2025-05-09 PROCEDURE — 99213 OFFICE O/P EST LOW 20 MIN: CPT | Performed by: NURSE PRACTITIONER

## 2025-05-09 PROCEDURE — G8427 DOCREV CUR MEDS BY ELIG CLIN: HCPCS | Performed by: NURSE PRACTITIONER

## 2025-05-09 NOTE — PATIENT INSTRUCTIONS
Highly recommended aggressive bowel regimen.     Call sooner with any questions or concerns.

## 2025-05-09 NOTE — PROGRESS NOTES
[facility] SPECIALY CARE, [facility] [place]  [address] [facility] [address]  Dept: [phone]    Visit Date: 5/9/2025        HPI:     Zoila Rick is a 64 y.o. female who presents today for:  Chief Complaint   Patient presents with    Urinary Retention     Irizarry back in place 5/6/25       HPI  Patient presents to urology clinic for follow-up.      Zoila was seen in ER on 3/8/25 for urinary retention. 1L bladder scan noted, irizarry was placed. Irizarry was initially removed on 1/27/25. Failed second void trial following 3/17/25 visit. She was again evaluated in ER 5/6/25 for urinary retention. Irizarry draining clear, yellow urine today. Denies flank pain, suprapubic pressure, gross hematuria, dysuria, fever or chills.        ++ constipation. We discussed risk of urinary retention with constipation.       Current Outpatient Medications   Medication Sig Dispense Refill    bethanechol (URECHOLINE) 25 MG tablet Take 1 tablet by mouth 4 times daily 120 tablet 0    sodium chloride 1 g tablet Take 1 tablet by mouth 3 times daily 90 tablet 0    albuterol sulfate HFA (PROVENTIL;VENTOLIN;PROAIR) 108 (90 Base) MCG/ACT inhaler Inhale into the lungs every 4 hours as needed      aspirin 81 MG EC tablet Take 1 tablet by mouth daily      fluticasone-salmeterol (ADVAIR) 100-50 MCG/ACT AEPB diskus inhaler Take 1 puff by mouth 2 times daily      polyethylene glycol (GLYCOLAX) 17 GM/SCOOP powder Take 17 g by mouth daily      docusate sodium (COLACE) 100 MG capsule Take 1 capsule by mouth 2 times daily as needed for Constipation      ibuprofen (ADVIL;MOTRIN) 800 MG tablet Take 1 tablet by mouth 2 times daily Taken with 500 mg tylenol      acetaminophen (TYLENOL) 500 MG tablet Take 1 tablet by mouth in the morning and at bedtime Taken with 800 mg motrin      JANUVIA 100 MG tablet Take 1 tablet by mouth daily      losartan (COZAAR) 25 MG tablet

## 2025-05-26 ENCOUNTER — APPOINTMENT (OUTPATIENT)
Dept: GENERAL RADIOLOGY | Age: 65
End: 2025-05-26
Payer: MEDICARE

## 2025-05-26 ENCOUNTER — HOSPITAL ENCOUNTER (EMERGENCY)
Age: 65
Discharge: HOME | End: 2025-05-26
Attending: EMERGENCY MEDICINE
Payer: MEDICARE

## 2025-05-26 VITALS
HEIGHT: 66 IN | HEART RATE: 98 BPM | RESPIRATION RATE: 16 BRPM | SYSTOLIC BLOOD PRESSURE: 135 MMHG | OXYGEN SATURATION: 97 % | DIASTOLIC BLOOD PRESSURE: 71 MMHG | BODY MASS INDEX: 26.68 KG/M2 | TEMPERATURE: 97.5 F | WEIGHT: 166 LBS

## 2025-05-26 DIAGNOSIS — K59.00 CONSTIPATION, UNSPECIFIED CONSTIPATION TYPE: Primary | ICD-10-CM

## 2025-05-26 PROCEDURE — 99283 EMERGENCY DEPT VISIT LOW MDM: CPT

## 2025-05-26 PROCEDURE — 74022 RADEX COMPL AQT ABD SERIES: CPT

## 2025-05-26 PROCEDURE — 6370000000 HC RX 637 (ALT 250 FOR IP): Performed by: EMERGENCY MEDICINE

## 2025-05-26 RX ORDER — SODIUM PHOSPHATE, DIBASIC AND SODIUM PHOSPHATE, MONOBASIC 7; 19 G/230ML; G/230ML
1 ENEMA RECTAL
Status: COMPLETED | OUTPATIENT
Start: 2025-05-26 | End: 2025-05-26

## 2025-05-26 RX ADMIN — MAJOR MAGNESIUM CITRATE ORAL SOLUTION - LEMON 296 ML: 1.75 LIQUID ORAL at 21:31

## 2025-05-26 RX ADMIN — SODIUM PHOSPHATE 1 ENEMA: 7; 19 ENEMA RECTAL at 21:31

## 2025-05-26 ASSESSMENT — PAIN - FUNCTIONAL ASSESSMENT: PAIN_FUNCTIONAL_ASSESSMENT: NONE - DENIES PAIN

## 2025-05-27 NOTE — DISCHARGE INSTRUCTIONS
Drink the mag citrate bottle while at home.  You can also try the rectal enema as given.  Follow-up with your primary care doctor in the morning for reevaluation.  You may continue to take MiraLAX 2 times a day and the lactulose as instructed.  Return to the emergency department if you develop a fever, vomiting, abdominal pain, blood in the urine or stool, or if you have any other problems or concerns as discussed.

## 2025-05-27 NOTE — ED PROVIDER NOTES
Disp-120 tablet, R-0Normal      sodium chloride 1 g tablet Take 1 tablet by mouth 3 times daily, Disp-90 tablet, R-0Normal      albuterol sulfate HFA (PROVENTIL;VENTOLIN;PROAIR) 108 (90 Base) MCG/ACT inhaler Inhale into the lungs every 4 hours as neededHistorical Med      aspirin 81 MG EC tablet Take 1 tablet by mouth dailyHistorical Med      fluticasone-salmeterol (ADVAIR) 100-50 MCG/ACT AEPB diskus inhaler Take 1 puff by mouth 2 times dailyHistorical Med      polyethylene glycol (GLYCOLAX) 17 GM/SCOOP powder Take 17 g by mouth dailyHistorical Med      docusate sodium (COLACE) 100 MG capsule Take 1 capsule by mouth 2 times daily as needed for ConstipationHistorical Med      ibuprofen (ADVIL;MOTRIN) 800 MG tablet Take 1 tablet by mouth 2 times daily Taken with 500 mg tylenolHistorical Med      acetaminophen (TYLENOL) 500 MG tablet Take 1 tablet by mouth in the morning and at bedtime Taken with 800 mg motrinHistorical Med      JANUVIA 100 MG tablet Take 1 tablet by mouth daily, DAWHistorical Med      losartan (COZAAR) 25 MG tablet Take 0.5 tablets by mouth dailyHistorical Med      metoprolol succinate (TOPROL XL) 25 MG extended release tablet Take 1 tablet by mouth dailyHistorical Med      chlorthalidone (HYGROTON) 25 MG tablet TAKE 1 TABLET BY MOUTH ONCE DAILY AS NEEDED FOR swellingHistorical Med      benztropine (COGENTIN) 1 MG tablet Take 1 tablet by mouth 2 times dailyHistorical Med      rosuvastatin (CRESTOR) 20 MG tablet TAKE 1 TABLET DAILY IN THE EVENING (replaces pravastatin)Historical Med      FARXIGA 10 MG tablet Take 1 tablet by mouth every morning, DAWHistorical Med      loxapine (LOXITANE) 10 MG capsule Take by mouthHistorical Med      glimepiride (AMARYL) 2 MG tablet Take 1 tablet by mouth every morning (before breakfast)Historical Med      levothyroxine (SYNTHROID) 25 MCG tablet 1 tablet DailyHistorical Med      metFORMIN (GLUCOPHAGE) 1000 MG tablet Take 1 tablet by mouth 2 times daily (with meals),

## 2025-06-03 ENCOUNTER — OFFICE VISIT (OUTPATIENT)
Dept: PODIATRY | Age: 65
End: 2025-06-03
Payer: MEDICARE

## 2025-06-03 VITALS
DIASTOLIC BLOOD PRESSURE: 64 MMHG | BODY MASS INDEX: 27.83 KG/M2 | SYSTOLIC BLOOD PRESSURE: 120 MMHG | RESPIRATION RATE: 20 BRPM | HEART RATE: 84 BPM | WEIGHT: 169.8 LBS

## 2025-06-03 DIAGNOSIS — B35.1 DERMATOPHYTOSIS OF NAIL: ICD-10-CM

## 2025-06-03 DIAGNOSIS — E11.51 CONTROLLED TYPE 2 DM WITH PERIPHERAL CIRCULATORY DISORDER (HCC): Primary | ICD-10-CM

## 2025-06-03 PROCEDURE — 99999 PR OFFICE/OUTPT VISIT,PROCEDURE ONLY: CPT | Performed by: PODIATRIST

## 2025-06-03 PROCEDURE — 11720 DEBRIDE NAIL 1-5: CPT | Performed by: PODIATRIST

## 2025-06-03 NOTE — PROGRESS NOTES
Foot Care Worksheet  PCP: Zoila Evans APRN - NP  Last visit: 03/08/2025      Nail description: Thick , Yellow , Crumbly , Marked limitation of ambulation     Pain resulting from thickened and dystrophy of nail plate No    Nails involved  Right   1  (T5-T9)  Left    1, 2  (TA-T4)    Q7 1 Class A Finding - Non traumatic amputation of foot No    Q8 2 Class B Findings - Absent DP pulse No, Absent PT pulse No, Advanced tropic changes (3 required) Yes    Decrease hair growth Yes, Nail changes/thickening Yes, Pigmented changes/discoloration Yes, Skin texture (thin, shiny) No, Skin color (rubor/redness) No    Q9 1 Class B and 2 Class C Findings  Claudication No, Temperature change Yes, Paresthesia No, Burning No, Edema Yes    Subjective:  Patient presents to [facility] today for routine foot care.  Patient denies any new problems with their feet.  Allergies   Allergen Reactions    Bicillin [Penicillin G Benzathine] Shortness Of Breath    Ziprasidone Other (See Comments)     \"I can't sit still\"  Pt could not sit still, hyper         Past Medical History:   Diagnosis Date    Depression     Diabetes mellitus (HCC)     Hyperlipidemia     Hypertension     Thyroid disease     Hypothyroid    Toxic metabolic encephalopathy        Prior to Admission medications    Medication Sig Start Date End Date Taking? Authorizing Provider   bethanechol (URECHOLINE) 25 MG tablet Take 1 tablet by mouth 4 times daily 12/23/24  Yes Frank Rain MD   sodium chloride 1 g tablet Take 1 tablet by mouth 3 times daily 12/23/24  Yes Frank Rain MD   albuterol sulfate HFA (PROVENTIL;VENTOLIN;PROAIR) 108 (90 Base) MCG/ACT inhaler Inhale into the lungs every 4 hours as needed 11/26/24  Yes ProviderVictorina MD   aspirin 81 MG EC tablet Take 1 tablet by mouth daily 3/15/24  Yes Victorina Hernandez MD   fluticasone-salmeterol (ADVAIR) 100-50 MCG/ACT AEPB diskus inhaler Take 1 puff by mouth 2 times daily 11/26/24  Yes Provider

## 2025-06-23 ENCOUNTER — PROCEDURE VISIT (OUTPATIENT)
Dept: UROLOGY | Age: 65
End: 2025-06-23
Payer: MEDICARE

## 2025-06-23 VITALS
HEART RATE: 78 BPM | SYSTOLIC BLOOD PRESSURE: 120 MMHG | DIASTOLIC BLOOD PRESSURE: 64 MMHG | HEIGHT: 66 IN | BODY MASS INDEX: 27.83 KG/M2

## 2025-06-23 DIAGNOSIS — R33.9 URINARY RETENTION: Primary | ICD-10-CM

## 2025-06-23 PROCEDURE — 52000 CYSTOURETHROSCOPY: CPT | Performed by: UROLOGY

## 2025-06-23 RX ORDER — LIDOCAINE HYDROCHLORIDE 20 MG/ML
JELLY TOPICAL ONCE
Status: COMPLETED | OUTPATIENT
Start: 2025-06-23 | End: 2025-06-23

## 2025-06-23 RX ADMIN — LIDOCAINE HYDROCHLORIDE: 20 JELLY TOPICAL at 12:28

## 2025-06-23 NOTE — PROGRESS NOTES
Cystoscopy    Operative Note    Patient:  Zoila Rick  MRN: [id number]  YOB: 1960    Date: 06/23/25  Surgeon: DEJA CARLSON MD  Anesthesia: Urojet Local  Indications:     1. Urinary retention  - Constipation vs neurogenic component??  - Multiple failed void trials, irizarry recently placed 5/6/25  - She has been on bethanechol since December per PCP.   - Will proceed with cystoscopy for further evaluation  - irizarry catheter exchanges every 4 weeks      2. Constipation, unspecified constipation type  Recommend aggressive bowel regimen as this can contribute to urinary retention      Position: Dorsal Lithotomy  EBL: 0 ml    Findings:   The patient was prepped and draped in the usual sterile fashion.  The cystoscope was advanced through the urethra and into the bladder.  The bladder was thoroughly inspected and the following was noted:    Vagina: normal appearing vagina with normal color and discharge, no lesions  Residual Urine: moderate.  Urine clear, with no obvious infection  Urethra: not indicated urethral hypermobility not noted  Bladder: no tumor noted .  no bladder diverticulum.  Mild trabeculation noted.  Ureters: Orifices with normal configuration and location.      The cystoscope was removed.  The patient tolerated the procedure well.  Normal cysto  Retention from constipation?  Keep irizarry out  Eloisa in 4-8 weeks with pvr  If continued failed voiding trials, needs chronic irizarry vs spt

## 2025-06-23 NOTE — PROGRESS NOTES
Dornier Axix II Cystoscope Reference Number [id number] used for procedure was removed from sterile packaging after visual inspection.   LOT # [id number]  EXP DATE 10/06/27

## 2025-06-27 ENCOUNTER — HOSPITAL ENCOUNTER (EMERGENCY)
Age: 65
Discharge: HOME | End: 2025-06-27
Attending: EMERGENCY MEDICINE
Payer: MEDICARE

## 2025-06-27 VITALS
OXYGEN SATURATION: 96 % | TEMPERATURE: 97.9 F | WEIGHT: 166 LBS | HEART RATE: 88 BPM | BODY MASS INDEX: 27.2 KG/M2 | SYSTOLIC BLOOD PRESSURE: 131 MMHG | RESPIRATION RATE: 16 BRPM | DIASTOLIC BLOOD PRESSURE: 80 MMHG

## 2025-06-27 DIAGNOSIS — R33.9 URINARY RETENTION: Primary | ICD-10-CM

## 2025-06-27 LAB
BILIRUB UR QL STRIP: NEGATIVE
CLARITY UR: CLEAR
COLOR UR: YELLOW
COMMENT: ABNORMAL
GLUCOSE UR STRIP-MCNC: ABNORMAL MG/DL
HGB UR QL STRIP.AUTO: NEGATIVE
KETONES UR STRIP-MCNC: NEGATIVE MG/DL
LEUKOCYTE ESTERASE UR QL STRIP: NEGATIVE
NITRITE UR QL STRIP: NEGATIVE
PH UR STRIP: 7 [PH] (ref 5–6)
PROT UR STRIP-MCNC: NEGATIVE MG/DL
SP GR UR STRIP: 1.01 (ref 1.01–1.02)
UROBILINOGEN UR STRIP-ACNC: NORMAL EU/DL (ref 0–1)

## 2025-06-27 PROCEDURE — 99283 EMERGENCY DEPT VISIT LOW MDM: CPT

## 2025-06-27 PROCEDURE — 81003 URINALYSIS AUTO W/O SCOPE: CPT

## 2025-06-27 PROCEDURE — 51798 US URINE CAPACITY MEASURE: CPT

## 2025-06-27 PROCEDURE — 51702 INSERT TEMP BLADDER CATH: CPT

## 2025-06-27 NOTE — DISCHARGE INSTRUCTIONS
Contact the urologist about your urinary retention issue.  Return for fever, decreased urine output, dark urine, or if worse in any way.    Please understand that at this time there is no evidence for a more serious underlying process, but that early in the process of an illness or injury, an emergency department workup can be falsely reassuring.  You should contact your family doctor within the next 48 hours for a follow up appointment    THANK YOU!!!    From [facility] and [facility]    On behalf of the Emergency Department staff at [facility], I would like to thank you for giving us the opportunity to address your health care needs and concerns.    We hope that during your visit, our service was delivered in a professional and caring manner. Please keep [facility] in mind as we walk with you down the path to your own personal wellness.     Please expect an automated text message or email from us so we can ask a few questions about your health and progress. Based on your answers, a clinician may call you back to offer help and instructions.    Please understand that early in the process of an illness or injury, an emergency department workup can be falsely reassuring.  If you notice any worsening, changing or persistent symptoms please call your family doctor or return to the ER immediately.     Tell us how we did during your visit at [email or web address]   and let us know about your experience

## 2025-06-27 NOTE — ED PROVIDER NOTES
[facility] Emergency Department  [address] [address]  Phone: [phone]      Patient Name:  Zoila Rick  Medical Record Number:  [id number]  YOB: 1960  Date of Service:  6/27/2025  Primary Care Physician:  Zoila Evans APRN - NP      CHIEF COMPLAINT:       Chief Complaint   Patient presents with    Urinary Retention     Has not voided since 0600. Had a cath removed 6 days for same issue       HISTORY OF PRESENT ILLNESS:    Zoila Rick is a 64 y.o. female who presents with the complaint of urinary retention.  She was seen by Dr. Gallardo in the office for constipation versus neurogenic bladder.  She underwent cystoscopy.  They took the catheter at that time.  That was 6 days ago.  She is now having urinary retention and has not voided all day.  She last moved her bowels yesterday.  She has suprapubic discomfort but no other discomfort.  She denies back pain.  She denies nausea, vomiting, or diarrhea.  She denies fever.    CURRENT MEDICATIONS:      Previous Medications    ACETAMINOPHEN (TYLENOL) 500 MG TABLET    Take 1 tablet by mouth in the morning and at bedtime Taken with 800 mg motrin    ALBUTEROL SULFATE HFA (PROVENTIL;VENTOLIN;PROAIR) 108 (90 BASE) MCG/ACT INHALER    Inhale into the lungs every 4 hours as needed    ASPIRIN 81 MG EC TABLET    Take 1 tablet by mouth daily    BENZTROPINE (COGENTIN) 1 MG TABLET    Take 1 tablet by mouth 2 times daily    BETHANECHOL (URECHOLINE) 25 MG TABLET    Take 1 tablet by mouth 4 times daily    CHLORTHALIDONE (HYGROTON) 25 MG TABLET    TAKE 1 TABLET BY MOUTH ONCE DAILY AS NEEDED FOR swelling    DOCUSATE SODIUM (COLACE) 100 MG CAPSULE    Take 1 capsule by mouth 2 times daily as needed for Constipation    FARXIGA 10 MG TABLET    Take 1 tablet by mouth every morning    FLUTICASONE-SALMETEROL (ADVAIR) 100-50 MCG/ACT AEPB DISKUS INHALER    Take 1 puff by mouth 2 times daily    GLIMEPIRIDE (AMARYL) 2 MG TABLET    Take 1 tablet by mouth

## 2025-07-07 ENCOUNTER — OFFICE VISIT (OUTPATIENT)
Dept: UROLOGY | Age: 65
End: 2025-07-07
Payer: MEDICARE

## 2025-07-07 VITALS
HEIGHT: 65 IN | SYSTOLIC BLOOD PRESSURE: 122 MMHG | RESPIRATION RATE: 16 BRPM | HEART RATE: 80 BPM | WEIGHT: 166 LBS | BODY MASS INDEX: 27.66 KG/M2 | OXYGEN SATURATION: 97 % | DIASTOLIC BLOOD PRESSURE: 74 MMHG

## 2025-07-07 DIAGNOSIS — R33.9 URINARY RETENTION: Primary | ICD-10-CM

## 2025-07-07 PROCEDURE — 99212 OFFICE O/P EST SF 10 MIN: CPT | Performed by: NURSE PRACTITIONER

## 2025-07-07 PROCEDURE — G8427 DOCREV CUR MEDS BY ELIG CLIN: HCPCS | Performed by: NURSE PRACTITIONER

## 2025-07-07 PROCEDURE — G8417 CALC BMI ABV UP PARAM F/U: HCPCS | Performed by: NURSE PRACTITIONER

## 2025-07-07 PROCEDURE — 1036F TOBACCO NON-USER: CPT | Performed by: NURSE PRACTITIONER

## 2025-07-07 PROCEDURE — 99213 OFFICE O/P EST LOW 20 MIN: CPT | Performed by: NURSE PRACTITIONER

## 2025-07-07 PROCEDURE — 3017F COLORECTAL CA SCREEN DOC REV: CPT | Performed by: NURSE PRACTITIONER

## 2025-07-07 NOTE — PROGRESS NOTES
reports that she quit smoking about 11 months ago. Her smoking use included cigarettes. She started smoking about 46 years ago. She has a 42.7 pack-year smoking history. She has never used smokeless tobacco. She reports that she does not drink alcohol and does not use drugs.      Subjective:      REVIEW OF SYSTEMS:  Constitutional: negative  Eyes: negative  Respiratory: negative  Cardiovascular: negative  Gastrointestinal: negative  Musculoskeletal: negative  Genitourinary: negative except for what is in HPI  Skin: negative   Neurological: negative  Hematological/Lymphatic: negative  Psychological: negative    Objective:   /74   Pulse 80   Resp 16   Ht 1.651 m (5' 5\")   Wt 75.3 kg (166 lb)   SpO2 97%   BMI 27.62 kg/m²     Patient is a 64 y.o. female in no acute distress and alert and oriented to person, place and time.    Pulmonary: Non-labored respiration.  Cardiovascular: Normal rate and regular rhythm  Skin: Warm and dry.  Psych: Normal mood and affect.  Genitourinary: Bladder non-distended and non-tender.    POC  No results found for this visit on 07/07/25.      Patients recent PSA values are as follows  No results found for: \"PSA\"     Recent BUN/Creatinine:  Lab Results   Component Value Date/Time    BUN 5 12/23/2024 05:11 AM    CREATININE 0.8 12/23/2024 05:11 AM       Radiology  No updated urologic imaging for review     Assessment/Plan:   1. Urinary retention  - Constipation vs neurogenic component??  - Multiple failed void trials, irizarry recently replaced 6/27/25.  - She has been on bethanechol since December per PCP.   - Cystoscopy normal.   - We discussed chronic irizarry, SPT or CIC.   - Continue irizarry catheter exchanges every 4 weeks        -Patient has no other questions, comments, or concerns.   -They agree with and understand the plan of care.   -The patient was encouraged to call the office or seek emergency care should this change.      DESIREE Eastman - CNP

## 2025-07-25 ENCOUNTER — CLINICAL SUPPORT (OUTPATIENT)
Dept: UROLOGY | Age: 65
End: 2025-07-25
Payer: MEDICARE

## 2025-07-25 DIAGNOSIS — R33.9 URINARY RETENTION: Primary | ICD-10-CM

## 2025-07-25 PROCEDURE — 99212 OFFICE O/P EST SF 10 MIN: CPT | Performed by: UROLOGY

## 2025-07-25 PROCEDURE — 51702 INSERT TEMP BLADDER CATH: CPT | Performed by: UROLOGY

## 2025-07-25 NOTE — PROGRESS NOTES
Following Dr. Gallardo's plan of care:    Changed 16 FR catheter changed without difficulty.  Once balloon was deflated, removed catheter in total without difficulty.  Patient's vagina was cleansed with betadine.  16 FR  irizarry was inserted without difficulty.  Upon urine return, balloon inflated with 10cc sterile water.  Irizarry catheter was hooked up to leg bag with straps.  Patient instructed on catheter care including draining catheter bag and keeping catheter bag above the knee to prevent pulling on catheter causing blood.

## 2025-07-28 ENCOUNTER — TELEPHONE (OUTPATIENT)
Dept: SURGERY | Age: 65
End: 2025-07-28

## 2025-07-28 NOTE — TELEPHONE ENCOUNTER
Fax[facility]. Patient's last colonoscopy was 10/19/2020 with Dr Douglas. Patient is due for 10 year repeat in 2030. Staff spoke with patient and she is wanting to wait until 2030. Patient is having no symptoms.

## 2025-08-22 ENCOUNTER — CLINICAL SUPPORT (OUTPATIENT)
Dept: UROLOGY | Age: 65
End: 2025-08-22
Payer: MEDICARE

## 2025-08-22 DIAGNOSIS — R33.9 URINARY RETENTION: Primary | ICD-10-CM

## 2025-08-22 PROCEDURE — 99212 OFFICE O/P EST SF 10 MIN: CPT | Performed by: UROLOGY

## 2025-08-22 PROCEDURE — 51702 INSERT TEMP BLADDER CATH: CPT | Performed by: UROLOGY

## (undated) DEVICE — 1200CC GUARDIAN II: Brand: GUARDIAN

## (undated) DEVICE — MERCY DEFIANCE ENDO KIT: Brand: MEDLINE INDUSTRIES, INC.

## (undated) DEVICE — 60 ML SYRINGE REGULAR TIP: Brand: MONOJECT

## (undated) DEVICE — CATHETER SCLERO L240CM NDL 25GA L4MM SHTH DIA2.3MM CNTRST

## (undated) DEVICE — MARCON COLON DECOMPRESSION SET: Brand: MARCON

## (undated) DEVICE — 9165 UNIVERSAL PATIENT PLATE: Brand: 3M™

## (undated) DEVICE — CONNECTOR TBNG AUX H2O JET DISP FOR OLY 160/180 SER

## (undated) DEVICE — Device: Brand: SPOT EX ENDOSCOPIC TATTOO

## (undated) DEVICE — LINE SAMP O2 6.5FT W/FEMALE CONN F/ADULT CAPNOLINE PLUS

## (undated) DEVICE — SNARE ENDOSCP L240CM SHTH DIA2.4MM LOOP W20MM MIN WRK CHN

## (undated) DEVICE — TRAP SURG QUAD PARABOLA SLOT DSGN SFTY SCRN TRAPEASE